# Patient Record
Sex: FEMALE | Race: WHITE | Employment: FULL TIME | URBAN - METROPOLITAN AREA
[De-identification: names, ages, dates, MRNs, and addresses within clinical notes are randomized per-mention and may not be internally consistent; named-entity substitution may affect disease eponyms.]

---

## 2017-06-19 ENCOUNTER — TELEPHONE (OUTPATIENT)
Dept: GASTROENTEROLOGY | Age: 27
End: 2017-06-19

## 2017-12-12 ENCOUNTER — TELEPHONE (OUTPATIENT)
Dept: BARIATRICS/WEIGHT MGMT | Age: 27
End: 2017-12-12

## 2017-12-12 NOTE — TELEPHONE ENCOUNTER
Patient called stating that she left a message last week regarding non surgical weight loss program and she did not receive a call back from [place]. I transferred the call to [place].

## 2018-01-03 ENCOUNTER — OFFICE VISIT (OUTPATIENT)
Dept: BARIATRICS/WEIGHT MGMT | Age: 28
End: 2018-01-03

## 2018-01-03 VITALS
BODY MASS INDEX: 50.02 KG/M2 | SYSTOLIC BLOOD PRESSURE: 121 MMHG | WEIGHT: 293 LBS | HEIGHT: 64 IN | DIASTOLIC BLOOD PRESSURE: 76 MMHG | HEART RATE: 70 BPM

## 2018-01-03 DIAGNOSIS — K76.0 FATTY LIVER: ICD-10-CM

## 2018-01-03 DIAGNOSIS — M54.50 CHRONIC BILATERAL LOW BACK PAIN WITHOUT SCIATICA: ICD-10-CM

## 2018-01-03 DIAGNOSIS — G89.29 CHRONIC BILATERAL LOW BACK PAIN WITHOUT SCIATICA: ICD-10-CM

## 2018-01-03 DIAGNOSIS — F32.9 REACTIVE DEPRESSION: ICD-10-CM

## 2018-01-03 DIAGNOSIS — E66.01 MORBID OBESITY WITH BMI OF 50.0-59.9, ADULT (HCC): Primary | ICD-10-CM

## 2018-01-03 DIAGNOSIS — D64.9 ANEMIA, UNSPECIFIED TYPE: ICD-10-CM

## 2018-01-03 DIAGNOSIS — K21.9 CHRONIC GERD: ICD-10-CM

## 2018-01-03 PROCEDURE — 1036F TOBACCO NON-USER: CPT | Performed by: SURGERY

## 2018-01-03 PROCEDURE — G8427 DOCREV CUR MEDS BY ELIG CLIN: HCPCS | Performed by: SURGERY

## 2018-01-03 PROCEDURE — 99205 OFFICE O/P NEW HI 60 MIN: CPT | Performed by: SURGERY

## 2018-01-03 PROCEDURE — S9470 NUTRITIONAL COUNSELING, DIET: HCPCS | Performed by: SURGERY

## 2018-01-03 PROCEDURE — G8484 FLU IMMUNIZE NO ADMIN: HCPCS | Performed by: SURGERY

## 2018-01-03 PROCEDURE — G8417 CALC BMI ABV UP PARAM F/U: HCPCS | Performed by: SURGERY

## 2018-01-04 PROBLEM — K76.0 FATTY LIVER: Status: ACTIVE | Noted: 2018-01-04

## 2018-01-07 ASSESSMENT — ENCOUNTER SYMPTOMS
BACK PAIN: 1
HEARTBURN: 1
CONSTIPATION: 0
PHOTOPHOBIA: 0
DOUBLE VISION: 0
DIARRHEA: 0
HEMOPTYSIS: 0
ORTHOPNEA: 0
EYE PAIN: 0
NAUSEA: 1
BLOOD IN STOOL: 0
SPUTUM PRODUCTION: 0
WHEEZING: 0
EYE REDNESS: 0
COUGH: 0
STRIDOR: 0
ABDOMINAL PAIN: 1
SHORTNESS OF BREATH: 1
EYE DISCHARGE: 0
VOMITING: 0
SORE THROAT: 0
BLURRED VISION: 0

## 2018-10-08 ENCOUNTER — HOSPITAL ENCOUNTER (EMERGENCY)
Age: 28
Discharge: HOME | End: 2018-10-08
Payer: COMMERCIAL

## 2018-10-08 VITALS
HEIGHT: 63 IN | TEMPERATURE: 97.5 F | BODY MASS INDEX: 51.91 KG/M2 | HEART RATE: 91 BPM | SYSTOLIC BLOOD PRESSURE: 131 MMHG | RESPIRATION RATE: 16 BRPM | WEIGHT: 293 LBS | DIASTOLIC BLOOD PRESSURE: 83 MMHG | OXYGEN SATURATION: 99 %

## 2018-10-08 DIAGNOSIS — R82.71 BACTERIURIA: ICD-10-CM

## 2018-10-08 DIAGNOSIS — R10.9 ABDOMINAL PAIN, UNSPECIFIED ABDOMINAL LOCATION: Primary | ICD-10-CM

## 2018-10-08 LAB
ALBUMIN SERPL-MCNC: 3.4 GM/DL (ref 3.4–5)
ALP BLD-CCNC: 72 IU/L (ref 40–129)
ALT SERPL-CCNC: 17 U/L (ref 10–40)
ANION GAP SERPL CALCULATED.3IONS-SCNC: 13 MMOL/L (ref 4–16)
AST SERPL-CCNC: 19 IU/L (ref 15–37)
BACTERIA: ABNORMAL /HPF
BASOPHILS ABSOLUTE: 0.1 K/CU MM
BASOPHILS RELATIVE PERCENT: 0.5 % (ref 0–1)
BILIRUB SERPL-MCNC: 0.8 MG/DL (ref 0–1)
BILIRUBIN URINE: NEGATIVE MG/DL
BLOOD, URINE: NEGATIVE
BUN BLDV-MCNC: 6 MG/DL (ref 6–23)
CALCIUM SERPL-MCNC: 8.4 MG/DL (ref 8.3–10.6)
CAST TYPE: ABNORMAL /HPF
CHLORIDE BLD-SCNC: 103 MMOL/L (ref 99–110)
CLARITY: CLEAR
CO2: 23 MMOL/L (ref 21–32)
COLOR: YELLOW
CREAT SERPL-MCNC: 0.7 MG/DL (ref 0.6–1.1)
CRYSTAL TYPE: ABNORMAL /HPF
DIFFERENTIAL TYPE: ABNORMAL
EOSINOPHILS ABSOLUTE: 0.2 K/CU MM
EOSINOPHILS RELATIVE PERCENT: 1.6 % (ref 0–3)
EPITHELIAL CELLS, UA: ABNORMAL /HPF
GFR AFRICAN AMERICAN: >60 ML/MIN/1.73M2
GFR NON-AFRICAN AMERICAN: >60 ML/MIN/1.73M2
GLUCOSE BLD-MCNC: 107 MG/DL (ref 70–99)
GLUCOSE, URINE: NEGATIVE MG/DL
HCG QUALITATIVE: NEGATIVE
HCT VFR BLD CALC: 40.3 % (ref 37–47)
HEMOGLOBIN: 11.7 GM/DL (ref 12.5–16)
IMMATURE NEUTROPHIL %: 0.4 % (ref 0–0.43)
KETONES, URINE: NEGATIVE MG/DL
LEUKOCYTE ESTERASE, URINE: NEGATIVE
LYMPHOCYTES ABSOLUTE: 3.5 K/CU MM
LYMPHOCYTES RELATIVE PERCENT: 24.1 % (ref 24–44)
MCH RBC QN AUTO: 21.9 PG (ref 27–31)
MCHC RBC AUTO-ENTMCNC: 29 % (ref 32–36)
MCV RBC AUTO: 75.3 FL (ref 78–100)
MONOCYTES ABSOLUTE: 0.6 K/CU MM
MONOCYTES RELATIVE PERCENT: 4 % (ref 0–4)
MUCUS: ABNORMAL HPF
NITRITE URINE, QUANTITATIVE: POSITIVE
PDW BLD-RTO: 16.1 % (ref 11.7–14.9)
PH, URINE: 5.5 (ref 5–8)
PLATELET # BLD: 427 K/CU MM (ref 140–440)
PMV BLD AUTO: 9.9 FL (ref 7.5–11.1)
POTASSIUM SERPL-SCNC: 3.4 MMOL/L (ref 3.5–5.1)
PROTEIN UA: NEGATIVE MG/DL
RBC # BLD: 5.35 M/CU MM (ref 4.2–5.4)
RBC URINE: ABNORMAL /HPF (ref 0–6)
SEGMENTED NEUTROPHILS ABSOLUTE COUNT: 10.1 K/CU MM
SEGMENTED NEUTROPHILS RELATIVE PERCENT: 69.4 % (ref 36–66)
SODIUM BLD-SCNC: 139 MMOL/L (ref 135–145)
SPECIFIC GRAVITY UA: 1.02 (ref 1–1.03)
TOTAL IMMATURE NEUTOROPHIL: 0.06 K/CU MM
TOTAL PROTEIN: 8.1 GM/DL (ref 6.4–8.2)
UROBILINOGEN, URINE: 1 MG/DL (ref 0.2–1)
VOLUME, (UVOL): 12 ML (ref 10–12)
WBC # BLD: 14.5 K/CU MM (ref 4–10.5)
WBC UA: ABNORMAL /HPF (ref 0–5)

## 2018-10-08 PROCEDURE — 96372 THER/PROPH/DIAG INJ SC/IM: CPT

## 2018-10-08 PROCEDURE — 85025 COMPLETE CBC W/AUTO DIFF WBC: CPT

## 2018-10-08 PROCEDURE — 80053 COMPREHEN METABOLIC PANEL: CPT

## 2018-10-08 PROCEDURE — 99283 EMERGENCY DEPT VISIT LOW MDM: CPT

## 2018-10-08 PROCEDURE — 81001 URINALYSIS AUTO W/SCOPE: CPT

## 2018-10-08 PROCEDURE — 84703 CHORIONIC GONADOTROPIN ASSAY: CPT

## 2018-10-08 PROCEDURE — 87086 URINE CULTURE/COLONY COUNT: CPT

## 2018-10-08 PROCEDURE — 36415 COLL VENOUS BLD VENIPUNCTURE: CPT

## 2018-10-08 PROCEDURE — 6360000002 HC RX W HCPCS: Performed by: PHYSICIAN ASSISTANT

## 2018-10-08 RX ORDER — CEPHALEXIN 500 MG/1
500 CAPSULE ORAL 3 TIMES DAILY
Qty: 21 CAPSULE | Refills: 0 | Status: SHIPPED | OUTPATIENT
Start: 2018-10-08 | End: 2018-10-15

## 2018-10-08 RX ORDER — DICYCLOMINE HYDROCHLORIDE 10 MG/ML
20 INJECTION INTRAMUSCULAR ONCE
Status: COMPLETED | OUTPATIENT
Start: 2018-10-08 | End: 2018-10-08

## 2018-10-08 RX ORDER — KETOROLAC TROMETHAMINE 30 MG/ML
15 INJECTION, SOLUTION INTRAMUSCULAR; INTRAVENOUS ONCE
Status: COMPLETED | OUTPATIENT
Start: 2018-10-08 | End: 2018-10-08

## 2018-10-08 RX ORDER — DICYCLOMINE HYDROCHLORIDE 10 MG/1
20 CAPSULE ORAL
Qty: 30 CAPSULE | Refills: 0 | Status: SHIPPED | OUTPATIENT
Start: 2018-10-08 | End: 2020-02-18

## 2018-10-08 RX ADMIN — KETOROLAC TROMETHAMINE 15 MG: 30 INJECTION, SOLUTION INTRAMUSCULAR; INTRAVENOUS at 18:22

## 2018-10-08 RX ADMIN — DICYCLOMINE HYDROCHLORIDE 20 MG: 20 INJECTION, SOLUTION INTRAMUSCULAR at 18:22

## 2018-10-08 ASSESSMENT — PAIN SCALES - GENERAL: PAINLEVEL_OUTOF10: 1

## 2018-10-08 NOTE — ED PROVIDER NOTES
40 - 129 IU/L    GFR Non-African American >60 >60 mL/min/1.73m2    GFR African American >60 >60 mL/min/1.73m2    Anion Gap 13 4 - 16   HCG Serum, Qualitative   Result Value Ref Range    hCG Qual NEGATIVE    Urinalysis   Result Value Ref Range    Color, UA YELLOW UYELL    Clarity, UA CLEAR CLEAR    Glucose, Urine NEGATIVE NEG MG/DL    Bilirubin Urine NEGATIVE NEG MG/DL    Ketones, Urine NEGATIVE NEG MG/DL    Specific Gravity, UA 1.025 1.001 - 1.035    Blood, Urine NEGATIVE NEG    pH, Urine 5.5 5.0 - 8.0    Protein, UA NEGATIVE NEG MG/DL    Urobilinogen, Urine 1.0 0.2 - 1.0 MG/DL    Nitrite Urine, Quantitative POSITIVE (A) NEG    Leukocyte Esterase, Urine NEGATIVE NEG    Volume, (UVOL) 12 10 - 12 ML    RBC, UA NO CELLS SEEN 0 - 6 /HPF    WBC, UA 2 TO 3 0 - 5 /HPF    Epi Cells 10 TO 12 /HPF    Cast Type NO CAST FORMS SEEN NCFS /HPF    Bacteria, UA MODERATE (A) NEG /HPF    Crystal Type NONE SEEN NEG /HPF    Mucus, UA 3+ (A) NEG HPF       ED COURSE & MEDICAL DECISION MAKING       Vital signs and nursing notes reviewed during ED course. I have independently evaluated this patient . Supervising MD present in the Emergency Department, available for consultation, throughout entirety of  patient care. Patient presents above with 3 days of intermittent abdominal pain. On arrival, patient is hemodynamically stable, afebrile. She is overall very well appearing on exam and resting comfortably in exam bed. Abdomen is soft with mild discomfort to palpation in suprapubic region. patient given dose of Bentyl and Toradol. Lab results with leukocytosis of 14.5. Serum hCG is negative. Urinalysis with moderate amounts of bacteria, to 3 white blood cells and is nitrite positive. Urine culture pending. This patient does have discomfort palpation over bladder, I discussed treatment of this possible urinary tract infection.   On reexamination, abdomen remained soft, she states that she is not currently having any abdominal pain

## 2018-10-08 NOTE — ED NOTES
Patient given AVS, discharge instructions and prescriptions. Verbalizes understanding no further needs identified at this time.      Aliza Macias RN  10/08/18 [id number]

## 2018-10-10 LAB
CULTURE: NORMAL
Lab: NORMAL
REPORT STATUS: NORMAL
SPECIMEN: NORMAL
TOTAL COLONY COUNT: NORMAL

## 2019-03-21 ENCOUNTER — HOSPITAL ENCOUNTER (OUTPATIENT)
Dept: NEUROLOGY | Age: 29
Discharge: HOME | End: 2019-03-21
Payer: COMMERCIAL

## 2019-03-21 PROCEDURE — 95861 NEEDLE EMG 2 EXTREMITIES: CPT

## 2019-05-24 ENCOUNTER — HOSPITAL ENCOUNTER (EMERGENCY)
Age: 29
Discharge: HOME | End: 2019-05-24
Payer: COMMERCIAL

## 2019-05-24 VITALS
OXYGEN SATURATION: 100 % | BODY MASS INDEX: 49.51 KG/M2 | HEART RATE: 99 BPM | DIASTOLIC BLOOD PRESSURE: 82 MMHG | TEMPERATURE: 98.6 F | HEIGHT: 64 IN | WEIGHT: 290 LBS | SYSTOLIC BLOOD PRESSURE: 139 MMHG | RESPIRATION RATE: 18 BRPM

## 2019-05-24 DIAGNOSIS — R04.0 EPISTAXIS: ICD-10-CM

## 2019-05-24 DIAGNOSIS — R09.81 NASAL CONGESTION: Primary | ICD-10-CM

## 2019-05-24 PROCEDURE — 6370000000 HC RX 637 (ALT 250 FOR IP): Performed by: PHYSICIAN ASSISTANT

## 2019-05-24 PROCEDURE — 99282 EMERGENCY DEPT VISIT SF MDM: CPT

## 2019-05-24 RX ORDER — OXYMETAZOLINE HYDROCHLORIDE 0.05 G/100ML
2 SPRAY NASAL ONCE
Status: COMPLETED | OUTPATIENT
Start: 2019-05-24 | End: 2019-05-24

## 2019-05-24 RX ORDER — ECHINACEA PURPUREA EXTRACT 125 MG
1 TABLET ORAL PRN
Qty: 1 BOTTLE | Refills: 3 | Status: SHIPPED | OUTPATIENT
Start: 2019-05-24 | End: 2020-02-18

## 2019-05-24 RX ADMIN — OXYMETAZOLINE HCL 2 SPRAY: 0.05 SPRAY NASAL at 09:01

## 2019-05-24 NOTE — ED PROVIDER NOTES
eMERGENCY dEPARTMENT eNCOUnter        PCP: DADA Cazares - CNP    CHIEF COMPLAINT  Chief Complaint   Patient presents with    Epistaxis     from rt nostril only since this am       HPI    Allie Giron is a 33 y.o. female who presents with nosebleed with onset  this a.m. Patient has had some nasal congestion a few past few days. Has had an intermittent light nosebleeds this morning. When she sits at rest without anything in her nose does not drip out of her nose. Has not had any blood in her mouth. The location is the right naris. The context was a spontaneous onset. The patient is taking no blood thinners. The quality of the bleeding is bright red blood. There no aggravating or alleviating factors except for direct pressure that has slowed down the bleeding. The patient has no associated symptoms. REVIEW OF SYSTEMS    General: No Fever  Cardiac: No Chest Pain, Denies near syncope / syncope. Respiratory: No shortness of breath or or difficulty breathing  GI: No abdominal pain. No Bloody Stool or dark stools. : No Dysuria or Hematuria  Neurologic: No LOC, no lightheadedness  Integument: No spontaneous bruising. No rash.     All other review of systems are negative  See HPI and nursing notes for additional information       PAST MEDICAL & SURGICAL HISTORY    Past Medical History:   Diagnosis Date    Anemia 2010    Gall bladder stones     UTI (lower urinary tract infection)     chronic UTI's with this pregnancy     Past Surgical History:   Procedure Laterality Date     SECTION       SECTION      CHOLECYSTECTOMY      HERNIA REPAIR      HERNIA REPAIR      LAPAROSCOPY  13    diagnostic    UPPER GASTROINTESTINAL ENDOSCOPY         CURRENT MEDICATIONS    Current Outpatient Rx   Medication Sig Dispense Refill    sodium chloride (OCEAN) 0.65 % nasal spray 1 spray by Nasal route as needed for Congestion 1 Bottle 3    dicyclomine (BENTYL) 10 MG capsule Take 2 PHYSICAL EXAM    VITAL SIGNS: /82   Pulse 99   Temp 98.6 °F (37 °C) (Oral)   Resp 18   Ht 5' 4\" (1.626 m)   Wt 290 lb (131.5 kg)   SpO2 100%   BMI 49.78 kg/m²    Constitutional:  Well developed, well nourished, no acute distress   Nose: + Dried blood noted within the right nares. Left nares unremarkable. Edematous turbinates with nasal congestion noted. HENT:  Atraumatic, moist mucus membranes. Moist, pink palpebral conjunctiva  Oropharynx clear of blood. Neck/Lymphatics: supple, no JVD, no swollen nodes  Respiratory:  Lungs Clear, no retractions   Cardiovascular:  regular rate, no murmurs  GI:  Soft, nontender, normal bowel sounds  Musculoskeletal:  No edema, no acute deformities  Integument:  Well hydrated, no petechiae, ecchymosis, or purpura  Neurologic:  Alert & oriented, no slurred speech  Psych: Pleasant affect, no hallucinations              ED COURSE & MEDICAL DECISION MAKING       Vital signs and nursing notes reviewed during ED course. I have independently evaluated this patient . Supervising MD present in the Emergency Department, available for consultation, throughout entirety of  patient care. Disposition and plan discussed at bedside with patient and/or the family today. All pertinent Lab data and radiographic results reviewed with patient at bedside. The patient and/or the family were informed of the results of any tests/labs/imaging, the treatment plan, and time was allotted to answer questions. Vitals:    05/24/19 0814 05/24/19 0905   BP: 132/63 139/82   Pulse: 108 99   Resp: 18 18   Temp: 98.6 °F (37 °C)    TempSrc: Oral    SpO2: 99% 100%   Weight: 290 lb (131.5 kg)    Height: 5' 4\" (1.626 m)        Patient presents as above. He is afebrile 99% on room air. Not tachycardic. Presents with intermittent epistaxis this morning that appears to be like a white drip.  Not actively bleeding when I evaluate patient she has dried blood noted with significant nasal congestion. Discussed expectant management, nasal saline, lubrication, humidifier, nasal congestion control. Hemodynamically stable. At this time I do feel patient is safe for discharge. We did discuss expectant management, follow-up with PCP and signs and symptoms are more more emergent return to ED for reevaluation. Patient comfortable with this workup and plan. Clinical  IMPRESSION    Epistaxis      We discussed the use of humidifier at home and I recommend over-the-counter Ayr saline gel to keep nasal mucosa lubricated. Patient agrees to return emergency department if bleeding recurs or any new symptoms develop. Comment: Please note this report has been produced using speech recognition software and may contain errors related to that system including errors in grammar, punctuation, and spelling, as well as words and phrases that may be inappropriate. If there are any questions or concerns please feel free to contact the dictating provider for clarification.        Ede Matthews PA-C  05/24/19 2759

## 2019-09-30 ENCOUNTER — HOSPITAL ENCOUNTER (OUTPATIENT)
Dept: NUCLEAR MEDICINE | Age: 29
Discharge: HOME | End: 2019-09-30
Payer: COMMERCIAL

## 2019-09-30 DIAGNOSIS — E05.90 T3 THYROTOXICOSIS: ICD-10-CM

## 2019-09-30 PROCEDURE — 78014 THYROID IMAGING W/BLOOD FLOW: CPT

## 2019-09-30 PROCEDURE — A9516 IODINE I-123 SOD IODIDE MIC: HCPCS | Performed by: INTERNAL MEDICINE

## 2019-09-30 PROCEDURE — 3430000000 HC RX DIAGNOSTIC RADIOPHARMACEUTICAL: Performed by: INTERNAL MEDICINE

## 2019-09-30 RX ADMIN — SODIUM IODIDE I 123 228 MICRO CURIE: 100 CAPSULE, GELATIN COATED ORAL at 08:55

## 2019-10-01 ENCOUNTER — HOSPITAL ENCOUNTER (OUTPATIENT)
Dept: NUCLEAR MEDICINE | Age: 29
Discharge: HOME | End: 2019-10-01
Payer: COMMERCIAL

## 2020-02-10 ENCOUNTER — TELEPHONE (OUTPATIENT)
Dept: SURGERY | Age: 30
End: 2020-02-10

## 2020-02-10 ENCOUNTER — OFFICE VISIT (OUTPATIENT)
Dept: SURGERY | Age: 30
End: 2020-02-10
Payer: COMMERCIAL

## 2020-02-10 VITALS
BODY MASS INDEX: 48.56 KG/M2 | HEART RATE: 116 BPM | WEIGHT: 263.9 LBS | SYSTOLIC BLOOD PRESSURE: 138 MMHG | DIASTOLIC BLOOD PRESSURE: 86 MMHG | HEIGHT: 62 IN

## 2020-02-10 PROCEDURE — 99204 OFFICE O/P NEW MOD 45 MIN: CPT | Performed by: SURGERY

## 2020-02-10 PROCEDURE — 1036F TOBACCO NON-USER: CPT | Performed by: SURGERY

## 2020-02-10 PROCEDURE — G8417 CALC BMI ABV UP PARAM F/U: HCPCS | Performed by: SURGERY

## 2020-02-10 PROCEDURE — G8427 DOCREV CUR MEDS BY ELIG CLIN: HCPCS | Performed by: SURGERY

## 2020-02-10 PROCEDURE — G8484 FLU IMMUNIZE NO ADMIN: HCPCS | Performed by: SURGERY

## 2020-02-10 ASSESSMENT — ENCOUNTER SYMPTOMS
TROUBLE SWALLOWING: 0
NAUSEA: 0
BACK PAIN: 1
ABDOMINAL PAIN: 0
VOICE CHANGE: 0
ABDOMINAL DISTENTION: 0
SINUS PRESSURE: 0
DIARRHEA: 0
COLOR CHANGE: 0
CHEST TIGHTNESS: 0
EYE REDNESS: 0
SHORTNESS OF BREATH: 0
EYE PAIN: 0
SORE THROAT: 0

## 2020-02-10 NOTE — TELEPHONE ENCOUNTER
[facility] Scheduling, requested order and demographics be faxed over, they would call patient to schedule.

## 2020-02-10 NOTE — PROGRESS NOTES
Department of GeneralSurgery   Dr Cody Castro:  Thyroid disease     History Obtained From:  patient    HISTORY OF PRESENT ILLNESS:      79-year-old female with past medical history of anemia secondary to menorrhagia and cholelithiasis who presents to clinic today to discuss possible thyroid surgery. Patient reports she initially presented to her PCP in 2018 for weight gain in which she underwent thyroid labs. She was noted to be hyperthyroid. She had an ultrasound at that time performed in [place]. Secondary to insurance loss she was lost to follow-up. She now follows with Dr. Ap Herr. She underwent radionucleotide thyroid uptake scan which is likely consistent with Graves' disease. She is currently prescribed PTU however, she is unable to take the medication secondary to nausea, vomiting. She reports she now has a prescription for Zofran and she will attempt to retake the medication. She denies any diaphoresis, tremors, palpitations, and weight loss. She does report that she has an elevated heart rate in which she has not seen a cardiologist.  Her pulse rate at today's visit is 116 bpm.    She reports her past surgical history is consistent with  and cholecystectomy. She reports an allergy to penicillin, itching and morphine, unknown. She denies smoking and alcohol use. She does report a sister with thyroid disease however, she is unclear what kind.   Her sister has had thyroid surgery in the past.    Past Medical History:    Past Medical History:   Diagnosis Date    Anemia    Belvia Walker bladder stones     UTI (lower urinary tract infection)     chronic UTI's with this pregnancy       Past Surgical History:    Past Surgical History:   Procedure Laterality Date     SECTION       SECTION      CHOLECYSTECTOMY      HERNIA REPAIR      HERNIA REPAIR      LAPAROSCOPY  13    diagnostic    UPPER GASTROINTESTINAL ENDOSCOPY         Current Medications: partner violence:     Fear of current or ex partner: None     Emotionally abused: None     Physically abused: None     Forced sexual activity: None   Other Topics Concern    None   Social History Narrative    None       Family History:   Family History   Problem Relation Age of Onset    Thyroid Disease Sister     Thyroid Disease Paternal Aunt     Cancer Maternal Grandmother     Cancer Paternal Grandmother        REVIEW OFSYSTEMS:    Review of Systems   Constitutional: Positive for unexpected weight change. Negative for chills and fever. HENT: Positive for ear pain. Negative for sinus pressure, sore throat, trouble swallowing and voice change. Eyes: Negative for pain and redness. Respiratory: Negative for chest tightness and shortness of breath. Cardiovascular: Negative for chest pain and palpitations. Gastrointestinal: Negative for abdominal distention, abdominal pain, diarrhea and nausea. Endocrine: Negative for cold intolerance and heat intolerance. Genitourinary: Negative for flank pain and frequency. Musculoskeletal: Positive for arthralgias and back pain. Skin: Negative for color change and pallor. Neurological: Negative for dizziness, seizures and facial asymmetry. Hematological: Negative for adenopathy. Does not bruise/bleed easily. Psychiatric/Behavioral: Negative for agitation and behavioral problems. PHYSICAL EXAM:  Vitals:    02/10/20 1436   BP: 138/86   Pulse: 116   Weight: 263 lb 14.4 oz (119.7 kg)   Height: 5' 2\" (1.575 m)       Physical Exam  Constitutional:       Appearance: Normal appearance. HENT:      Head: Normocephalic and atraumatic. Right Ear: External ear normal.      Left Ear: External ear normal.      Nose: Nose normal.      Mouth/Throat:      Mouth: Mucous membranes are moist.   Eyes:      Extraocular Movements: Extraocular movements intact. Pupils: Pupils are equal, round, and reactive to light. Neck:      Musculoskeletal: Neck supple. No neck rigidity or muscular tenderness. Comments: No significant goiter palpable. Her left thyroid appears more full than the right. Mild palpable nodularity on the left. Cardiovascular:      Rate and Rhythm: Tachycardia present. Pulmonary:      Effort: Pulmonary effort is normal.   Abdominal:      General: There is no distension. Tenderness: There is no abdominal tenderness. There is no right CVA tenderness or guarding. Lymphadenopathy:      Cervical: No cervical adenopathy. Skin:     General: Skin is warm. Neurological:      General: No focal deficit present. Mental Status: She is alert and oriented to person, place, and time. Psychiatric:         Mood and Affect: Mood normal.         Behavior: Behavior normal.           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 She is currently unable to take PTU secondary to nausea and vomiting. She recently received a prescription for Zofran and states that she will attempt to take the medication again. She does have mild sinus tachycardia with a heart rate of 116 bpm in the office today. She has not yet seen a cardiologist.    She reports her last ultrasound of the neck was in 2018. She also reports that she recently had labs drawn by Dr. Mecca Denney office in January. Lastly, she complains of significant back and bone pain. She denies any history of kidney stones however, she does have a history of gallstones.   She also reports being slightly more

## 2020-02-13 ENCOUNTER — OFFICE VISIT (OUTPATIENT)
Dept: SURGERY | Age: 30
End: 2020-02-13
Payer: COMMERCIAL

## 2020-02-13 VITALS
SYSTOLIC BLOOD PRESSURE: 130 MMHG | WEIGHT: 261.7 LBS | BODY MASS INDEX: 48.16 KG/M2 | DIASTOLIC BLOOD PRESSURE: 84 MMHG | HEIGHT: 62 IN | HEART RATE: 112 BPM

## 2020-02-13 PROCEDURE — G8417 CALC BMI ABV UP PARAM F/U: HCPCS | Performed by: SURGERY

## 2020-02-13 PROCEDURE — 99213 OFFICE O/P EST LOW 20 MIN: CPT | Performed by: SURGERY

## 2020-02-13 PROCEDURE — G8484 FLU IMMUNIZE NO ADMIN: HCPCS | Performed by: SURGERY

## 2020-02-13 PROCEDURE — G8427 DOCREV CUR MEDS BY ELIG CLIN: HCPCS | Performed by: SURGERY

## 2020-02-13 PROCEDURE — 1036F TOBACCO NON-USER: CPT | Performed by: SURGERY

## 2020-02-13 RX ORDER — IODINE SOLUTION STRONG 5% (LUGOL'S) 5 %
0.2 SOLUTION ORAL 3 TIMES DAILY
Qty: 1 BOTTLE | Refills: 3 | Status: ON HOLD
Start: 2020-02-13 | End: 2020-03-04 | Stop reason: HOSPADM

## 2020-02-13 ASSESSMENT — ENCOUNTER SYMPTOMS
SINUS PRESSURE: 0
DIARRHEA: 0
NAUSEA: 0
SORE THROAT: 0
EYE REDNESS: 0
VOICE CHANGE: 0
BACK PAIN: 1
COLOR CHANGE: 0
SHORTNESS OF BREATH: 0
TROUBLE SWALLOWING: 0
ABDOMINAL PAIN: 0
ABDOMINAL DISTENTION: 0
CHEST TIGHTNESS: 0
EYE PAIN: 0

## 2020-02-13 NOTE — PROGRESS NOTES
Department of GeneralSurgery   Dr Debi Espinosa:  Thyroid disease     History Obtained From:  patient    HISTORY OF PRESENT ILLNESS:      26-year-old female with past medical history of anemia secondary to menorrhagia and cholelithiasis who presents to clinic today to discuss possible thyroid surgery. She is here post u/s of thyroid for graves disease. This was done at [facility]. It shows no nodules but hypervascularity consistent with Graves disease. She reports her past surgical history is consistent with  and cholecystectomy. She reports an allergy to penicillin, itching and morphine, unknown. She denies smoking and alcohol use. She does report a sister with thyroid disease however, she is unclear what kind.   Her sister has had thyroid surgery in the past.    Past Medical History:    Past Medical History:   Diagnosis Date    Anemia 2010    Gall bladder stones     UTI (lower urinary tract infection)     chronic UTI's with this pregnancy       Past Surgical History:    Past Surgical History:   Procedure Laterality Date     SECTION       SECTION      CHOLECYSTECTOMY      HERNIA REPAIR      HERNIA REPAIR      LAPAROSCOPY  13    diagnostic    UPPER GASTROINTESTINAL ENDOSCOPY         Current Medications:   Current Outpatient Medications   Medication Sig Dispense Refill    Iodine Strong, Lugols, (STRONG IODINE) 5 % solution Take 0.2 mLs by mouth 3 times daily For 7 days pre-op 1 Bottle 3    sodium chloride (OCEAN) 0.65 % nasal spray 1 spray by Nasal route as needed for Congestion 1 Bottle 3    dicyclomine (BENTYL) 10 MG capsule Take 2 capsules by mouth 4 times daily (before meals and nightly) 30 capsule 0    medroxyPROGESTERone (DEPO-PROVERA) 150 MG/ML injection INJECT 1 MILLILITER (150 MG) BY INTRAMUSCULAR ROUTE EVERY 3 MONTHS FOR 90 DAYS  0    ferrous sulfate 325 (65 FE) MG tablet Take 325 mg by mouth daily (with breakfast)       No current

## 2020-02-14 ENCOUNTER — ANESTHESIA EVENT (OUTPATIENT)
Dept: OPERATING ROOM | Age: 30
End: 2020-02-14
Payer: COMMERCIAL

## 2020-02-17 ENCOUNTER — TELEPHONE (OUTPATIENT)
Dept: SURGERY | Age: 30
End: 2020-02-17

## 2020-02-17 NOTE — TELEPHONE ENCOUNTER
SP[place] (thyroidectomy) SCHEDULED @ [facility].  NOTIFIED OF DATES, TIMES AND LOCATION    PHONE ASSESSMENT /PAT - 2/18/20 @ 200  SURGERY - 2/25/20 @ 11  P/O - 3/9/20 @ 900    Start lugols 2/18/20  NPO AFTER MIDNIGHT  Patient not on blood thinners   SENT

## 2020-02-17 NOTE — ANESTHESIA PRE PROCEDURE
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 D48.5       Past Medical History:        Diagnosis Date    Anemia     Gall bladder stones     UTI (lower urinary tract infection)     chronic UTI's with this pregnancy       Past Surgical History:        Procedure Laterality Date     SECTION       SECTION      CHOLECYSTECTOMY      HERNIA REPAIR      HERNIA REPAIR      LAPAROSCOPY  13    diagnostic    UPPER GASTROINTESTINAL ENDOSCOPY         Social History:    Social History     Tobacco Use    Smoking status: Former Smoker     Packs/day: 0.10     Types: Cigarettes     Last attempt to quit: 2013     Years since quittin.1    Smokeless tobacco: Never Used   Substance Use Topics    Alcohol use: No                                Counseling given: Not Answered      Vital Signs (Current): There were no vitals filed for this visit. BP Readings from Last 3 Encounters:   20 130/84   02/10/20 138/86   19 139/82       NPO Status:                                                                                 BMI:   Wt Readings from Last 3 Encounters:   20 261 lb 11.2 oz (118.7 kg)   02/10/20 263 lb 14.4 oz (119.7 kg)   19 290 lb (131.5 kg)     There is no height or weight on file to calculate BMI.       CBC  Lab Results   Component Value Date/Time    WBC 9.1 2020 01:24 PM    HGB 10.6 (L) 6/5/9320    Umbilical hernia repair,  2013      Left Medial Lower extremity ulcers s/pt nerve decompression repair, excision of neoplasm, left foot, with muscle flap and graft skin application, 2129. Resolved  Abdominal:           Vascular:                              Anesthesia Plan      general     ASA 3       Induction: intravenous. MIPS: Postoperative opioids intended and Prophylactic antiemetics administered. Anesthetic plan and risks discussed with patient and sibling. Plan discussed with CRNA. DADA Andino - CNP Chart reviewed, pt. Interviewed and examined. Anesthesia Evaluation will follow by a certified practitioner prior to surgery.   2/17/2020

## 2020-02-18 ENCOUNTER — HOSPITAL ENCOUNTER (OUTPATIENT)
Dept: PREADMISSION TESTING | Age: 30
Discharge: HOME | End: 2020-02-22
Payer: COMMERCIAL

## 2020-02-18 VITALS
SYSTOLIC BLOOD PRESSURE: 127 MMHG | RESPIRATION RATE: 18 BRPM | DIASTOLIC BLOOD PRESSURE: 75 MMHG | HEART RATE: 112 BPM | WEIGHT: 258 LBS | OXYGEN SATURATION: 98 % | BODY MASS INDEX: 45.71 KG/M2 | HEIGHT: 63 IN | TEMPERATURE: 97.6 F

## 2020-02-18 LAB
ANION GAP SERPL CALCULATED.3IONS-SCNC: 12 MMOL/L (ref 4–16)
BUN BLDV-MCNC: 8 MG/DL (ref 6–23)
CALCIUM SERPL-MCNC: 8.8 MG/DL (ref 8.3–10.6)
CHLORIDE BLD-SCNC: 107 MMOL/L (ref 99–110)
CO2: 22 MMOL/L (ref 21–32)
CREAT SERPL-MCNC: 0.4 MG/DL (ref 0.6–1.1)
EKG ATRIAL RATE: 94 BPM
EKG DIAGNOSIS: NORMAL
EKG P AXIS: 65 DEGREES
EKG P-R INTERVAL: 130 MS
EKG Q-T INTERVAL: 348 MS
EKG QRS DURATION: 90 MS
EKG QTC CALCULATION (BAZETT): 435 MS
EKG R AXIS: -4 DEGREES
EKG T AXIS: 33 DEGREES
EKG VENTRICULAR RATE: 94 BPM
GFR AFRICAN AMERICAN: >60 ML/MIN/1.73M2
GFR NON-AFRICAN AMERICAN: >60 ML/MIN/1.73M2
GLUCOSE BLD-MCNC: 109 MG/DL (ref 70–99)
HCT VFR BLD CALC: 35.6 % (ref 37–47)
HEMOGLOBIN: 10.6 GM/DL (ref 12.5–16)
MCH RBC QN AUTO: 21.5 PG (ref 27–31)
MCHC RBC AUTO-ENTMCNC: 29.8 % (ref 32–36)
MCV RBC AUTO: 72.2 FL (ref 78–100)
PDW BLD-RTO: 15.7 % (ref 11.7–14.9)
PLATELET # BLD: 376 K/CU MM (ref 140–440)
PMV BLD AUTO: 9.7 FL (ref 7.5–11.1)
POTASSIUM SERPL-SCNC: 3.7 MMOL/L (ref 3.5–5.1)
RBC # BLD: 4.93 M/CU MM (ref 4.2–5.4)
SODIUM BLD-SCNC: 141 MMOL/L (ref 135–145)
WBC # BLD: 9.1 K/CU MM (ref 4–10.5)

## 2020-02-18 PROCEDURE — 93005 ELECTROCARDIOGRAM TRACING: CPT | Performed by: NURSE PRACTITIONER

## 2020-02-18 PROCEDURE — 93010 ELECTROCARDIOGRAM REPORT: CPT | Performed by: INTERNAL MEDICINE

## 2020-02-18 PROCEDURE — 80048 BASIC METABOLIC PNL TOTAL CA: CPT

## 2020-02-18 PROCEDURE — 85027 COMPLETE CBC AUTOMATED: CPT

## 2020-02-18 RX ORDER — IBUPROFEN 800 MG/1
800 TABLET ORAL EVERY 8 HOURS PRN
COMMUNITY
End: 2021-08-23

## 2020-02-18 RX ORDER — ONDANSETRON 4 MG/1
TABLET, FILM COATED ORAL
COMMUNITY
Start: 2020-02-15 | End: 2021-08-23

## 2020-02-18 RX ORDER — PROPRANOLOL HYDROCHLORIDE 20 MG/1
20 TABLET ORAL 2 TIMES DAILY
COMMUNITY
End: 2020-02-25 | Stop reason: SDUPTHER

## 2020-02-18 ASSESSMENT — ENCOUNTER SYMPTOMS: DYSPNEA ACTIVITY LEVEL: AFTER AMBULATING 2 FLIGHTS OF STAIRS

## 2020-02-25 ENCOUNTER — HOSPITAL ENCOUNTER (EMERGENCY)
Age: 30
Discharge: HOME | End: 2020-02-25
Payer: COMMERCIAL

## 2020-02-25 ENCOUNTER — APPOINTMENT (OUTPATIENT)
Dept: GENERAL RADIOLOGY | Age: 30
End: 2020-02-25
Payer: COMMERCIAL

## 2020-02-25 VITALS
HEIGHT: 63 IN | HEART RATE: 115 BPM | TEMPERATURE: 98 F | OXYGEN SATURATION: 100 % | WEIGHT: 258 LBS | RESPIRATION RATE: 26 BRPM | SYSTOLIC BLOOD PRESSURE: 147 MMHG | BODY MASS INDEX: 45.71 KG/M2 | DIASTOLIC BLOOD PRESSURE: 71 MMHG

## 2020-02-25 LAB
ALBUMIN SERPL-MCNC: 3.2 GM/DL (ref 3.4–5)
ALP BLD-CCNC: 138 IU/L (ref 40–129)
ALT SERPL-CCNC: 15 U/L (ref 10–40)
ANION GAP SERPL CALCULATED.3IONS-SCNC: 13 MMOL/L (ref 4–16)
AST SERPL-CCNC: 18 IU/L (ref 15–37)
BASOPHILS ABSOLUTE: 0 K/CU MM
BASOPHILS RELATIVE PERCENT: 0.2 % (ref 0–1)
BILIRUB SERPL-MCNC: 0.6 MG/DL (ref 0–1)
BUN BLDV-MCNC: 8 MG/DL (ref 6–23)
CALCIUM SERPL-MCNC: 8.9 MG/DL (ref 8.3–10.6)
CHLORIDE BLD-SCNC: 105 MMOL/L (ref 99–110)
CO2: 23 MMOL/L (ref 21–32)
CREAT SERPL-MCNC: 0.3 MG/DL (ref 0.6–1.1)
D DIMER: 227 NG/ML(DDU)
DIFFERENTIAL TYPE: ABNORMAL
EOSINOPHILS ABSOLUTE: 0.1 K/CU MM
EOSINOPHILS RELATIVE PERCENT: 1.2 % (ref 0–3)
GFR AFRICAN AMERICAN: >60 ML/MIN/1.73M2
GFR NON-AFRICAN AMERICAN: >60 ML/MIN/1.73M2
GLUCOSE BLD-MCNC: 102 MG/DL (ref 70–99)
HCG QUALITATIVE: NEGATIVE
HCT VFR BLD CALC: 35 % (ref 37–47)
HEMOGLOBIN: 10.4 GM/DL (ref 12.5–16)
IMMATURE NEUTROPHIL %: 0.2 % (ref 0–0.43)
LYMPHOCYTES ABSOLUTE: 2.5 K/CU MM
LYMPHOCYTES RELATIVE PERCENT: 29.9 % (ref 24–44)
MAGNESIUM: 1.8 MG/DL (ref 1.8–2.4)
MCH RBC QN AUTO: 21.4 PG (ref 27–31)
MCHC RBC AUTO-ENTMCNC: 29.7 % (ref 32–36)
MCV RBC AUTO: 72.2 FL (ref 78–100)
MONOCYTES ABSOLUTE: 0.6 K/CU MM
MONOCYTES RELATIVE PERCENT: 6.9 % (ref 0–4)
NUCLEATED RBC %: 0 %
PDW BLD-RTO: 15.8 % (ref 11.7–14.9)
PLATELET # BLD: 379 K/CU MM (ref 140–440)
PMV BLD AUTO: 9.8 FL (ref 7.5–11.1)
POTASSIUM SERPL-SCNC: 3.5 MMOL/L (ref 3.5–5.1)
RBC # BLD: 4.85 M/CU MM (ref 4.2–5.4)
SEGMENTED NEUTROPHILS ABSOLUTE COUNT: 5.1 K/CU MM
SEGMENTED NEUTROPHILS RELATIVE PERCENT: 61.6 % (ref 36–66)
SODIUM BLD-SCNC: 141 MMOL/L (ref 135–145)
T4 FREE: 4.43 NG/DL (ref 0.9–1.8)
TOTAL IMMATURE NEUTOROPHIL: 0.02 K/CU MM
TOTAL NUCLEATED RBC: 0 K/CU MM
TOTAL PROTEIN: 7.4 GM/DL (ref 6.4–8.2)
TROPONIN T: <0.01 NG/ML
TSH HIGH SENSITIVITY: <0.01 UIU/ML (ref 0.27–4.2)
WBC # BLD: 8.4 K/CU MM (ref 4–10.5)

## 2020-02-25 PROCEDURE — 85025 COMPLETE CBC W/AUTO DIFF WBC: CPT

## 2020-02-25 PROCEDURE — 84703 CHORIONIC GONADOTROPIN ASSAY: CPT

## 2020-02-25 PROCEDURE — 84484 ASSAY OF TROPONIN QUANT: CPT

## 2020-02-25 PROCEDURE — 85379 FIBRIN DEGRADATION QUANT: CPT

## 2020-02-25 PROCEDURE — 83735 ASSAY OF MAGNESIUM: CPT

## 2020-02-25 PROCEDURE — 93005 ELECTROCARDIOGRAM TRACING: CPT | Performed by: PHYSICIAN ASSISTANT

## 2020-02-25 PROCEDURE — 84439 ASSAY OF FREE THYROXINE: CPT

## 2020-02-25 PROCEDURE — 80053 COMPREHEN METABOLIC PANEL: CPT

## 2020-02-25 PROCEDURE — 36415 COLL VENOUS BLD VENIPUNCTURE: CPT

## 2020-02-25 PROCEDURE — 93010 ELECTROCARDIOGRAM REPORT: CPT | Performed by: INTERNAL MEDICINE

## 2020-02-25 PROCEDURE — 99285 EMERGENCY DEPT VISIT HI MDM: CPT

## 2020-02-25 PROCEDURE — 84443 ASSAY THYROID STIM HORMONE: CPT

## 2020-02-25 PROCEDURE — 71045 X-RAY EXAM CHEST 1 VIEW: CPT

## 2020-02-25 RX ORDER — PROPRANOLOL HYDROCHLORIDE 20 MG/1
40 TABLET ORAL 3 TIMES DAILY
Qty: 42 TABLET | Refills: 0 | Status: SHIPPED | OUTPATIENT
Start: 2020-02-25 | End: 2020-03-16

## 2020-02-25 NOTE — ED PROVIDER NOTES
This EKG was interpreted by me. Rate is 118, rhythm is sinus. PA and QT intervals are within normal limits. There is no ST segment or T wave changes. This EKG was compared to previous EKG from date 2/18/2020. T wave previously inverted in V3, now upright.      Cyndi Gowers, DO  02/25/20 7112

## 2020-02-25 NOTE — LETTER
[facility] Emergency Department  [address] [place] [id number]  Phone: [phone]  Fax: [phone]               February 25, 2020    Patient: Louie Olvera   YOB: 1990   Date of Visit: 2/25/2020       To Whom It May Concern:    Sincere Singletary was seen and treated in our emergency department on 2/25/2020.  She may return to work 2/27/2020      Sincerely,       Juanita LEAL      Signature:__________________________________

## 2020-02-26 NOTE — ED PROVIDER NOTES
Patient Identification  Anish Bangura is a 26 y.o. female    Chief Complaint  Tachycardia (seen at [facility] office today for thyroid issues, told to come here for recurrent HR of 115-120 over the past 2 weeks) and Shortness of Breath (with exertion)      HPI  (History provided by patient)  This is a 26 y.o. female who was brought in by self for chief complaint of tachycardia, SOB. Patient has a known history of Graves' disease with hyperthyroidism. States that she has been noting that for the past 2 weeks her heart rate has been elevated, is been occasionally short of breath with exertion. Reports occasional aching in her chest that is not currently present. States she is scheduled for surgery to have thyroid removed next week but went to Dr Nico Koroma office today and was sent here. She is currently on propranolol and states she has been taking this. No history of cardiac arrhythmia. No history of DVT or PE. Denies pain or swelling in legs. No syncope. Has been more tired than usual.       REVIEW OF SYSTEMS    Constitutional:  Denies fever, chills  HENT:  Denies sore throat or ear pain   Eyes: Denies vision changes, eye pain  Cardiovascular:  Denies syncope  Respiratory:  Denies cough   GI:  Denies abdominal pain, nausea, vomiting  :  Denies dysuria, discharge  Musculoskeletal:  Denies back pain, joint pain  Skin:  Denies rash, pruritis  Neurologic:  Denies headache, focal weakness, or sensory changes     See HPI and nursing notes for additional information     I have reviewed the following nursing documentation:  Allergies: Allergies   Allergen Reactions    Pcn [Penicillins] Hives    Morphine Hives    Gabapentin Rash       Past medical history:  has a past medical history of Anemia (), Foot ulcer, left ([facility] [address] (2017), Graves disease (Dx: 2019), Graves disease, and UTI (lower urinary tract infection).     Past surgical history:  has a past surgical history that includes  section; laparoscopy (12/21/13); Upper gastrointestinal endoscopy (07/24/2012); Cholecystectomy (2010); hernia repair; and hernia repair (2013). Home medications:   Prior to Admission medications    Medication Sig Start Date End Date Taking? Authorizing Provider   propranolol (INDERAL) 20 MG tablet Take 2 tablets by mouth 3 times daily for 7 days 2/25/20 3/3/20 Yes Tomás Samaniego PA-C   ibuprofen (ADVIL;MOTRIN) 800 MG tablet Take 800 mg by mouth every 8 hours as needed for Pain    Historical Provider, MD   ondansetron (ZOFRAN) 4 MG tablet  2/15/20   Historical Provider, MD   Iodine Strong, Lugols, (STRONG IODINE) 5 % solution Take 0.2 mLs by mouth 3 times daily For 7 days pre-op 2/13/20   Cassandra Deluca MD   ferrous sulfate 325 (65 FE) MG tablet Take 325 mg by mouth daily (with breakfast)    Historical Provider, MD       Social history:  reports that she quit smoking about 6 years ago. Her smoking use included cigarettes. She started smoking about 8 years ago. She has a 0.10 pack-year smoking history. She has never used smokeless tobacco. She reports that she does not drink alcohol or use drugs. Family history:    Family History   Problem Relation Age of Onset    Thyroid Disease Sister     Thyroid Disease Paternal Aunt     Cancer Maternal Grandmother     Cancer Paternal Grandmother     Diabetes Mother     Heart Disease Mother     No Known Problems Father          Exam  BP (!) 147/71   Pulse 115   Temp 98 °F (36.7 °C)   Resp 26   Ht 5' 2.5\" (1.588 m)   Wt 258 lb (117 kg)   LMP 02/01/2019 (Approximate)   SpO2 100%   BMI 46.44 kg/m²   Nursing note and vitals reviewed. Constitutional: Well developed, well nourished. No acute distress. HENT:      Head: Normocephalic and atraumatic. Ears: External ears normal.      Nose: Nose normal.     Mouth: Membrane mucosa moist and pink. No posterior oropharynx erythema or tonsillar edema  Eyes: Anicteric sclera. No discharge, PERRL  Neck: Supple.  Trachea

## 2020-02-28 LAB
EKG ATRIAL RATE: 118 BPM
EKG DIAGNOSIS: NORMAL
EKG P AXIS: 56 DEGREES
EKG P-R INTERVAL: 132 MS
EKG Q-T INTERVAL: 324 MS
EKG QRS DURATION: 96 MS
EKG QTC CALCULATION (BAZETT): 454 MS
EKG R AXIS: -2 DEGREES
EKG T AXIS: 50 DEGREES
EKG VENTRICULAR RATE: 118 BPM

## 2020-03-02 NOTE — H&P
General Surgery - H&P  Dr. Sulma Gonsales PA-C      HISTORY OF PRESENT ILLNESS:       26-year-old female with past medical history of anemia secondary to menorrhagia and cholelithiasis who presents to clinic today to discuss possible thyroid surgery. She is here post u/s of thyroid for graves disease. This was done at [facility]. It shows no nodules but hypervascularity consistent with Graves disease.      She reports her past surgical history is consistent with  and cholecystectomy. She reports an allergy to penicillin, itching and morphine, unknown. She denies smoking and alcohol use. She does report a sister with thyroid disease however, she is unclear what kind.   Her sister has had thyroid surgery in the past.     Past Medical History:    Past Medical History        Past Medical History:   Diagnosis Date    Anemia 2010    Gall bladder stones      UTI (lower urinary tract infection)       chronic UTI's with this pregnancy            Past Surgical History:    Past Surgical History         Past Surgical History:   Procedure Laterality Date     SECTION         SECTION        CHOLECYSTECTOMY        HERNIA REPAIR        HERNIA REPAIR        LAPAROSCOPY   13     diagnostic    UPPER GASTROINTESTINAL ENDOSCOPY                Current Medications:   Current Facility-Administered Medications          Current Outpatient Medications   Medication Sig Dispense Refill    Iodine Strong, Lugols, (STRONG IODINE) 5 % solution Take 0.2 mLs by mouth 3 times daily For 7 days pre-op 1 Bottle 3    sodium chloride (OCEAN) 0.65 % nasal spray 1 spray by Nasal route as needed for Congestion 1 Bottle 3    dicyclomine (BENTYL) 10 MG capsule Take 2 capsules by mouth 4 times daily (before meals and nightly) 30 capsule 0    medroxyPROGESTERone (DEPO-PROVERA) 150 MG/ML injection INJECT 1 MILLILITER (150 MG) BY INTRAMUSCULAR ROUTE EVERY 3 MONTHS FOR 90 DAYS   0    ferrous sulfate 325 (65 FE) MG change. Negative for chills and fever. HENT: Positive for ear pain. Negative for sinus pressure, sore throat, trouble swallowing and voice change. Eyes: Negative for pain and redness. Respiratory: Negative for chest tightness and shortness of breath. Cardiovascular: Negative for chest pain and palpitations. Gastrointestinal: Negative for abdominal distention, abdominal pain, diarrhea and nausea. Endocrine: Negative for cold intolerance and heat intolerance. Genitourinary: Negative for flank pain and frequency. Musculoskeletal: Positive for arthralgias and back pain. Skin: Negative for color change and pallor. Neurological: Negative for dizziness, seizures and facial asymmetry. Hematological: Negative for adenopathy. Does not bruise/bleed easily. Psychiatric/Behavioral: Negative for agitation and behavioral problems.         PHYSICAL EXAM:  Vitals       Vitals:     02/13/20 1256   BP: 130/84   Pulse: 112   Weight: 261 lb 11.2 oz (118.7 kg)   Height: 5' 2\" (1.575 m)            Physical Exam  Constitutional:       Appearance: Normal appearance. HENT:      Head: Normocephalic and atraumatic. Right Ear: External ear normal.      Left Ear: External ear normal.      Nose: Nose normal.      Mouth/Throat:      Mouth: Mucous membranes are moist.   Eyes:      Extraocular Movements: Extraocular movements intact. Pupils: Pupils are equal, round, and reactive to light. Neck:      Musculoskeletal: Neck supple. No neck rigidity or muscular tenderness. Comments: No significant goiter palpable. Her left thyroid appears more full than the right. Mild palpable nodularity on the left. Cardiovascular:      Rate and Rhythm: Tachycardia present. Pulmonary:      Effort: Pulmonary effort is normal.   Abdominal:      General: There is no distension. Tenderness: There is no abdominal tenderness. There is no right CVA tenderness or guarding.    Lymphadenopathy:      Cervical: No cervical adenopathy. Skin:     General: Skin is warm. Neurological:      General: No focal deficit present. Mental Status: She is alert and oriented to person, place, and time. Psychiatric:         Mood and Affect: Mood normal.         Behavior: Behavior normal.               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 She is currently unable to take PTU secondary to nausea and vomiting. She recently received a prescription for Zofran and states that she will attempt to take the medication again. She does have mild sinus tachycardia with a heart rate of 116 bpm in the office today. She has not yet seen a cardiologist.     She reports her last ultrasound of the neck was in 2018. She also reports that she recently had labs drawn by Dr. Leeanne Bradford office in January.      Lastly, she complains of significant back and bone pain. She denies any history of kidney stones however, she does have a history of gallstones. She also reports being slightly more emotional lately.     PLAN:     U/s is consistent with Graves disease  Will proceed with subtotal thyroidectomy. Risks, benefits and alternatives to treatment have been discussed with the pt, who has elected to proceed.          Brett Kramer PA-C

## 2020-03-03 ENCOUNTER — ANESTHESIA (OUTPATIENT)
Dept: OPERATING ROOM | Age: 30
End: 2020-03-03
Payer: COMMERCIAL

## 2020-03-03 ENCOUNTER — HOSPITAL ENCOUNTER (OUTPATIENT)
Age: 30
Setting detail: OBSERVATION
Discharge: HOME | End: 2020-03-04
Attending: SURGERY | Admitting: SURGERY
Payer: COMMERCIAL

## 2020-03-03 VITALS
TEMPERATURE: 98.8 F | DIASTOLIC BLOOD PRESSURE: 89 MMHG | SYSTOLIC BLOOD PRESSURE: 111 MMHG | OXYGEN SATURATION: 100 % | RESPIRATION RATE: 15 BRPM

## 2020-03-03 PROBLEM — E05.00 GRAVES DISEASE: Status: ACTIVE | Noted: 2020-03-03

## 2020-03-03 LAB
PREGNANCY TEST URINE, POC: NEGATIVE
PREGNANCY TEST URINE, POC: NORMAL

## 2020-03-03 PROCEDURE — 7100000000 HC PACU RECOVERY - FIRST 15 MIN: Performed by: SURGERY

## 2020-03-03 PROCEDURE — 88307 TISSUE EXAM BY PATHOLOGIST: CPT

## 2020-03-03 PROCEDURE — 6360000002 HC RX W HCPCS: Performed by: NURSE ANESTHETIST, CERTIFIED REGISTERED

## 2020-03-03 PROCEDURE — 3700000000 HC ANESTHESIA ATTENDED CARE: Performed by: SURGERY

## 2020-03-03 PROCEDURE — 2720000010 HC SURG SUPPLY STERILE: Performed by: SURGERY

## 2020-03-03 PROCEDURE — 2580000003 HC RX 258

## 2020-03-03 PROCEDURE — 6370000000 HC RX 637 (ALT 250 FOR IP): Performed by: PHYSICIAN ASSISTANT

## 2020-03-03 PROCEDURE — 2580000003 HC RX 258: Performed by: PHYSICIAN ASSISTANT

## 2020-03-03 PROCEDURE — 7100000001 HC PACU RECOVERY - ADDTL 15 MIN: Performed by: SURGERY

## 2020-03-03 PROCEDURE — 3600000003 HC SURGERY LEVEL 3 BASE: Performed by: SURGERY

## 2020-03-03 PROCEDURE — 3700000001 HC ADD 15 MINUTES (ANESTHESIA): Performed by: SURGERY

## 2020-03-03 PROCEDURE — 6360000002 HC RX W HCPCS: Performed by: PHYSICIAN ASSISTANT

## 2020-03-03 PROCEDURE — 94761 N-INVAS EAR/PLS OXIMETRY MLT: CPT

## 2020-03-03 PROCEDURE — 60240 REMOVAL OF THYROID: CPT | Performed by: PHYSICIAN ASSISTANT

## 2020-03-03 PROCEDURE — 2500000003 HC RX 250 WO HCPCS: Performed by: NURSE ANESTHETIST, CERTIFIED REGISTERED

## 2020-03-03 PROCEDURE — 2709999900 HC NON-CHARGEABLE SUPPLY: Performed by: SURGERY

## 2020-03-03 PROCEDURE — 81025 URINE PREGNANCY TEST: CPT

## 2020-03-03 PROCEDURE — 6360000002 HC RX W HCPCS: Performed by: ANESTHESIOLOGY

## 2020-03-03 PROCEDURE — C9113 INJ PANTOPRAZOLE SODIUM, VIA: HCPCS | Performed by: PHYSICIAN ASSISTANT

## 2020-03-03 PROCEDURE — 3600000013 HC SURGERY LEVEL 3 ADDTL 15MIN: Performed by: SURGERY

## 2020-03-03 PROCEDURE — G0378 HOSPITAL OBSERVATION PER HR: HCPCS

## 2020-03-03 PROCEDURE — 60240 REMOVAL OF THYROID: CPT | Performed by: SURGERY

## 2020-03-03 PROCEDURE — 2580000003 HC RX 258: Performed by: ANESTHESIOLOGY

## 2020-03-03 PROCEDURE — 2700000000 HC OXYGEN THERAPY PER DAY

## 2020-03-03 RX ORDER — PANTOPRAZOLE SODIUM 40 MG/10ML
40 INJECTION, POWDER, LYOPHILIZED, FOR SOLUTION INTRAVENOUS DAILY
Status: DISCONTINUED | OUTPATIENT
Start: 2020-03-03 | End: 2020-03-04 | Stop reason: HOSPADM

## 2020-03-03 RX ORDER — SODIUM CHLORIDE 0.9 % (FLUSH) 0.9 %
10 SYRINGE (ML) INJECTION EVERY 12 HOURS SCHEDULED
Status: DISCONTINUED | OUTPATIENT
Start: 2020-03-03 | End: 2020-03-04 | Stop reason: HOSPADM

## 2020-03-03 RX ORDER — SODIUM CHLORIDE, SODIUM LACTATE, POTASSIUM CHLORIDE, CALCIUM CHLORIDE 600; 310; 30; 20 MG/100ML; MG/100ML; MG/100ML; MG/100ML
INJECTION, SOLUTION INTRAVENOUS
Status: COMPLETED
Start: 2020-03-03 | End: 2020-03-03

## 2020-03-03 RX ORDER — ONDANSETRON 2 MG/ML
INJECTION INTRAMUSCULAR; INTRAVENOUS PRN
Status: DISCONTINUED | OUTPATIENT
Start: 2020-03-03 | End: 2020-03-03 | Stop reason: SDUPTHER

## 2020-03-03 RX ORDER — FENTANYL CITRATE 50 UG/ML
25 INJECTION, SOLUTION INTRAMUSCULAR; INTRAVENOUS EVERY 5 MIN PRN
Status: DISCONTINUED | OUTPATIENT
Start: 2020-03-03 | End: 2020-03-03 | Stop reason: HOSPADM

## 2020-03-03 RX ORDER — PROPRANOLOL HYDROCHLORIDE 10 MG/1
20 TABLET ORAL 3 TIMES DAILY
Status: DISCONTINUED | OUTPATIENT
Start: 2020-03-03 | End: 2020-03-04 | Stop reason: HOSPADM

## 2020-03-03 RX ORDER — LABETALOL 20 MG/4 ML (5 MG/ML) INTRAVENOUS SYRINGE
5 EVERY 10 MIN PRN
Status: DISCONTINUED | OUTPATIENT
Start: 2020-03-03 | End: 2020-03-03 | Stop reason: HOSPADM

## 2020-03-03 RX ORDER — DEXAMETHASONE SODIUM PHOSPHATE 4 MG/ML
INJECTION, SOLUTION INTRA-ARTICULAR; INTRALESIONAL; INTRAMUSCULAR; INTRAVENOUS; SOFT TISSUE PRN
Status: DISCONTINUED | OUTPATIENT
Start: 2020-03-03 | End: 2020-03-03 | Stop reason: SDUPTHER

## 2020-03-03 RX ORDER — LIDOCAINE HYDROCHLORIDE 20 MG/ML
INJECTION, SOLUTION INTRAVENOUS PRN
Status: DISCONTINUED | OUTPATIENT
Start: 2020-03-03 | End: 2020-03-03 | Stop reason: SDUPTHER

## 2020-03-03 RX ORDER — PROPOFOL 10 MG/ML
INJECTION, EMULSION INTRAVENOUS PRN
Status: DISCONTINUED | OUTPATIENT
Start: 2020-03-03 | End: 2020-03-03 | Stop reason: SDUPTHER

## 2020-03-03 RX ORDER — MIDAZOLAM HYDROCHLORIDE 1 MG/ML
INJECTION INTRAMUSCULAR; INTRAVENOUS PRN
Status: DISCONTINUED | OUTPATIENT
Start: 2020-03-03 | End: 2020-03-03 | Stop reason: SDUPTHER

## 2020-03-03 RX ORDER — SODIUM CHLORIDE 9 MG/ML
INJECTION, SOLUTION INTRAVENOUS CONTINUOUS
Status: DISCONTINUED | OUTPATIENT
Start: 2020-03-03 | End: 2020-03-04 | Stop reason: HOSPADM

## 2020-03-03 RX ORDER — LEVOFLOXACIN 5 MG/ML
500 INJECTION, SOLUTION INTRAVENOUS ONCE
Status: COMPLETED | OUTPATIENT
Start: 2020-03-03 | End: 2020-03-03

## 2020-03-03 RX ORDER — OXYCODONE HYDROCHLORIDE 5 MG/1
5 TABLET ORAL EVERY 4 HOURS PRN
Status: DISCONTINUED | OUTPATIENT
Start: 2020-03-03 | End: 2020-03-04 | Stop reason: HOSPADM

## 2020-03-03 RX ORDER — HYDRALAZINE HYDROCHLORIDE 20 MG/ML
5 INJECTION INTRAMUSCULAR; INTRAVENOUS EVERY 10 MIN PRN
Status: DISCONTINUED | OUTPATIENT
Start: 2020-03-03 | End: 2020-03-03 | Stop reason: HOSPADM

## 2020-03-03 RX ORDER — DIPHENHYDRAMINE HYDROCHLORIDE 50 MG/ML
12.5 INJECTION INTRAMUSCULAR; INTRAVENOUS ONCE
Status: COMPLETED | OUTPATIENT
Start: 2020-03-03 | End: 2020-03-03

## 2020-03-03 RX ORDER — SODIUM CHLORIDE, SODIUM LACTATE, POTASSIUM CHLORIDE, CALCIUM CHLORIDE 600; 310; 30; 20 MG/100ML; MG/100ML; MG/100ML; MG/100ML
INJECTION, SOLUTION INTRAVENOUS ONCE
Status: COMPLETED | OUTPATIENT
Start: 2020-03-03 | End: 2020-03-03

## 2020-03-03 RX ORDER — ONDANSETRON 2 MG/ML
4 INJECTION INTRAMUSCULAR; INTRAVENOUS EVERY 6 HOURS PRN
Status: DISCONTINUED | OUTPATIENT
Start: 2020-03-03 | End: 2020-03-04 | Stop reason: HOSPADM

## 2020-03-03 RX ORDER — HYDROMORPHONE HCL 110MG/55ML
PATIENT CONTROLLED ANALGESIA SYRINGE INTRAVENOUS PRN
Status: DISCONTINUED | OUTPATIENT
Start: 2020-03-03 | End: 2020-03-03 | Stop reason: SDUPTHER

## 2020-03-03 RX ORDER — FENTANYL CITRATE 50 UG/ML
INJECTION, SOLUTION INTRAMUSCULAR; INTRAVENOUS PRN
Status: DISCONTINUED | OUTPATIENT
Start: 2020-03-03 | End: 2020-03-03 | Stop reason: SDUPTHER

## 2020-03-03 RX ORDER — PROMETHAZINE HYDROCHLORIDE 25 MG/ML
6.25 INJECTION, SOLUTION INTRAMUSCULAR; INTRAVENOUS
Status: COMPLETED | OUTPATIENT
Start: 2020-03-03 | End: 2020-03-03

## 2020-03-03 RX ORDER — HYDROMORPHONE HCL 110MG/55ML
0.25 PATIENT CONTROLLED ANALGESIA SYRINGE INTRAVENOUS EVERY 5 MIN PRN
Status: DISCONTINUED | OUTPATIENT
Start: 2020-03-03 | End: 2020-03-03 | Stop reason: HOSPADM

## 2020-03-03 RX ORDER — HYDROMORPHONE HCL 110MG/55ML
0.5 PATIENT CONTROLLED ANALGESIA SYRINGE INTRAVENOUS EVERY 5 MIN PRN
Status: DISCONTINUED | OUTPATIENT
Start: 2020-03-03 | End: 2020-03-03 | Stop reason: HOSPADM

## 2020-03-03 RX ORDER — SODIUM CHLORIDE 0.9 % (FLUSH) 0.9 %
10 SYRINGE (ML) INJECTION PRN
Status: DISCONTINUED | OUTPATIENT
Start: 2020-03-03 | End: 2020-03-04 | Stop reason: HOSPADM

## 2020-03-03 RX ORDER — PROPRANOLOL HYDROCHLORIDE 10 MG/1
40 TABLET ORAL 3 TIMES DAILY
Status: DISCONTINUED | OUTPATIENT
Start: 2020-03-03 | End: 2020-03-03

## 2020-03-03 RX ORDER — ONDANSETRON 4 MG/1
4 TABLET, ORALLY DISINTEGRATING ORAL EVERY 8 HOURS PRN
Status: DISCONTINUED | OUTPATIENT
Start: 2020-03-03 | End: 2020-03-04 | Stop reason: HOSPADM

## 2020-03-03 RX ORDER — ROCURONIUM BROMIDE 10 MG/ML
INJECTION, SOLUTION INTRAVENOUS PRN
Status: DISCONTINUED | OUTPATIENT
Start: 2020-03-03 | End: 2020-03-03 | Stop reason: SDUPTHER

## 2020-03-03 RX ADMIN — PANTOPRAZOLE SODIUM 40 MG: 40 INJECTION, POWDER, FOR SOLUTION INTRAVENOUS at 10:24

## 2020-03-03 RX ADMIN — LIDOCAINE HYDROCHLORIDE 100 MG: 20 INJECTION, SOLUTION INTRAVENOUS at 07:37

## 2020-03-03 RX ADMIN — ROCURONIUM BROMIDE 50 MG: 10 INJECTION INTRAVENOUS at 07:37

## 2020-03-03 RX ADMIN — SODIUM CHLORIDE, POTASSIUM CHLORIDE, SODIUM LACTATE AND CALCIUM CHLORIDE: 600; 310; 30; 20 INJECTION, SOLUTION INTRAVENOUS at 07:34

## 2020-03-03 RX ADMIN — SODIUM CHLORIDE: 9 INJECTION, SOLUTION INTRAVENOUS at 23:56

## 2020-03-03 RX ADMIN — PROPOFOL 200 MG: 10 INJECTION, EMULSION INTRAVENOUS at 07:37

## 2020-03-03 RX ADMIN — HYDROMORPHONE HYDROCHLORIDE 0.5 MG: 2 INJECTION INTRAMUSCULAR; INTRAVENOUS; SUBCUTANEOUS at 08:08

## 2020-03-03 RX ADMIN — SODIUM CHLORIDE, POTASSIUM CHLORIDE, SODIUM LACTATE AND CALCIUM CHLORIDE: 600; 310; 30; 20 INJECTION, SOLUTION INTRAVENOUS at 08:42

## 2020-03-03 RX ADMIN — FENTANYL CITRATE 100 MCG: 50 INJECTION INTRAMUSCULAR; INTRAVENOUS at 07:37

## 2020-03-03 RX ADMIN — OXYCODONE HYDROCHLORIDE 5 MG: 5 TABLET ORAL at 20:16

## 2020-03-03 RX ADMIN — DIPHENHYDRAMINE HYDROCHLORIDE 12.5 MG: 50 INJECTION, SOLUTION INTRAMUSCULAR; INTRAVENOUS at 10:42

## 2020-03-03 RX ADMIN — HYDROMORPHONE HYDROCHLORIDE 0.5 MG: 2 INJECTION INTRAMUSCULAR; INTRAVENOUS; SUBCUTANEOUS at 07:57

## 2020-03-03 RX ADMIN — SODIUM CHLORIDE: 9 INJECTION, SOLUTION INTRAVENOUS at 09:25

## 2020-03-03 RX ADMIN — LEVOFLOXACIN 500 MG: 5 INJECTION, SOLUTION INTRAVENOUS at 07:38

## 2020-03-03 RX ADMIN — PROMETHAZINE HYDROCHLORIDE 6.25 MG: 25 INJECTION INTRAMUSCULAR; INTRAVENOUS at 09:10

## 2020-03-03 RX ADMIN — DEXAMETHASONE SODIUM PHOSPHATE 8 MG: 4 INJECTION, SOLUTION INTRAMUSCULAR; INTRAVENOUS at 07:45

## 2020-03-03 RX ADMIN — SUGAMMADEX 50 MG: 100 INJECTION, SOLUTION INTRAVENOUS at 08:49

## 2020-03-03 RX ADMIN — MIDAZOLAM 2 MG: 1 INJECTION INTRAMUSCULAR; INTRAVENOUS at 07:28

## 2020-03-03 RX ADMIN — SODIUM CHLORIDE, POTASSIUM CHLORIDE, SODIUM LACTATE AND CALCIUM CHLORIDE 1000 ML: 600; 310; 30; 20 INJECTION, SOLUTION INTRAVENOUS at 07:04

## 2020-03-03 RX ADMIN — ROCURONIUM BROMIDE 20 MG: 10 INJECTION INTRAVENOUS at 08:08

## 2020-03-03 RX ADMIN — FENTANYL CITRATE 25 MCG: 50 INJECTION, SOLUTION INTRAMUSCULAR; INTRAVENOUS at 14:10

## 2020-03-03 RX ADMIN — ROCURONIUM BROMIDE 10 MG: 10 INJECTION INTRAVENOUS at 08:34

## 2020-03-03 RX ADMIN — SUGAMMADEX 150 MG: 100 INJECTION, SOLUTION INTRAVENOUS at 08:54

## 2020-03-03 RX ADMIN — ONDANSETRON 4 MG: 2 INJECTION INTRAMUSCULAR; INTRAVENOUS at 08:41

## 2020-03-03 ASSESSMENT — PAIN SCALES - GENERAL
PAINLEVEL_OUTOF10: 4
PAINLEVEL_OUTOF10: 6
PAINLEVEL_OUTOF10: 5
PAINLEVEL_OUTOF10: 6

## 2020-03-03 ASSESSMENT — PULMONARY FUNCTION TESTS
PIF_VALUE: 21
PIF_VALUE: 22
PIF_VALUE: 21
PIF_VALUE: 23
PIF_VALUE: 21
PIF_VALUE: 15
PIF_VALUE: 22
PIF_VALUE: 21
PIF_VALUE: 22
PIF_VALUE: 5
PIF_VALUE: 21
PIF_VALUE: 20
PIF_VALUE: 17
PIF_VALUE: 23
PIF_VALUE: 22
PIF_VALUE: 21
PIF_VALUE: 20
PIF_VALUE: 22
PIF_VALUE: 22
PIF_VALUE: 4
PIF_VALUE: 22
PIF_VALUE: 23
PIF_VALUE: 21
PIF_VALUE: 20
PIF_VALUE: 21
PIF_VALUE: 22
PIF_VALUE: 3
PIF_VALUE: 23
PIF_VALUE: 19
PIF_VALUE: 19
PIF_VALUE: 21
PIF_VALUE: 22
PIF_VALUE: 21
PIF_VALUE: 22
PIF_VALUE: 23
PIF_VALUE: 22
PIF_VALUE: 5
PIF_VALUE: 20
PIF_VALUE: 24
PIF_VALUE: 22
PIF_VALUE: 21
PIF_VALUE: 4
PIF_VALUE: 18
PIF_VALUE: 21
PIF_VALUE: 22
PIF_VALUE: 21
PIF_VALUE: 22
PIF_VALUE: 28
PIF_VALUE: 22
PIF_VALUE: 21
PIF_VALUE: 3
PIF_VALUE: 19
PIF_VALUE: 22
PIF_VALUE: 19
PIF_VALUE: 21
PIF_VALUE: 19
PIF_VALUE: 21
PIF_VALUE: 22
PIF_VALUE: 22
PIF_VALUE: 19
PIF_VALUE: 21
PIF_VALUE: 21
PIF_VALUE: 22
PIF_VALUE: 21
PIF_VALUE: 19
PIF_VALUE: 23
PIF_VALUE: 21
PIF_VALUE: 21
PIF_VALUE: 7
PIF_VALUE: 1
PIF_VALUE: 21
PIF_VALUE: 5
PIF_VALUE: 21

## 2020-03-03 ASSESSMENT — PAIN DESCRIPTION - PAIN TYPE
TYPE: SURGICAL PAIN
TYPE: SURGICAL PAIN

## 2020-03-03 ASSESSMENT — PAIN DESCRIPTION - DESCRIPTORS
DESCRIPTORS: BURNING;SHARP
DESCRIPTORS: DISCOMFORT

## 2020-03-03 ASSESSMENT — PAIN DESCRIPTION - FREQUENCY
FREQUENCY: CONTINUOUS
FREQUENCY: OTHER (COMMENT)

## 2020-03-03 ASSESSMENT — PAIN - FUNCTIONAL ASSESSMENT: PAIN_FUNCTIONAL_ASSESSMENT: 0-10

## 2020-03-03 ASSESSMENT — PAIN DESCRIPTION - LOCATION
LOCATION: NECK
LOCATION: NECK

## 2020-03-03 NOTE — ANESTHESIA POSTPROCEDURE EVALUATION
Department of Anesthesiology  Postprocedure Note    Patient: Elly Van  MRN: [id number]  YOB: 1990  Date of evaluation: 3/3/2020  Time:  9:04 AM     Procedure Summary     Date:  03/03/20 Room / Location:  [address] [place] [address]    Anesthesia Start:  0639 Anesthesia Stop:  0950    Procedure:  TOTAL THYROIDECTOMY (Bilateral Neck) Diagnosis:  (GRAVES DISEASE)    Surgeon:  Tasha Luis MD Responsible Provider:  Love Hardy MD    Anesthesia Type:  general ASA Status:  3          Anesthesia Type: general    Jose Luis Phase I: Jose Luis Score: 10    Jose Luis Phase II:      Last vitals: Reviewed and per EMR flowsheets.        Anesthesia Post Evaluation    Patient location during evaluation: PACU  Patient participation: waiting for patient participation  Level of consciousness: sleepy but conscious  Airway patency: patent  Nausea & Vomiting: no vomiting and no nausea  Complications: no  Cardiovascular status: hemodynamically stable and blood pressure returned to baseline  Respiratory status: acceptable, face mask, nonlabored ventilation and spontaneous ventilation  Hydration status: stable

## 2020-03-03 NOTE — OP NOTE
6 cm transverse cervical incision was created above the sternal notch within a natural skin fold. The strap muscles were identified and divided at the midline. Sharp dissection was used to mobilize the left thyroid lobe in a medial direction. Dissection continued posteriorly to expose the tracheoesophageal groove and right carotid artery. The recurrent laryngeal nerve was identified and preserved. The thyroid lobe was mobilized further and the superior and inferior pole vessels were divided with the harmonic scalpel. The middle thyroid vein was divided with the harmonic scalpel. Small vessels were likewise divided. The gland was rotated in a medial direction and taken off the trachea using the harmonic scalpel. The isthmus was removed off the thyroid cartilage using the harmonic scalpel. In similar fashion the right thyroid was dissected and removed without difficulty. A suture was placed for orientation purposes. The specimen was submitted to pathology. The wound was irrigated and inspected carefully. The left upper and lower as and right upper parathyroid tissue were found to be viable. The strap muscles were closed with interrupted 4-0 Vicryl suture. The platysma was closed with interrupted 4-0 Vicryl suture, and the skin incision was closed with a 5-0 Vicryl subcuticular closure. A Steri-Strips was applied across the incision. Instrument, sponge, and needle counts were correct prior to closure and at the conclusion of the case.      Dipak Sagastume MD

## 2020-03-04 VITALS
OXYGEN SATURATION: 99 % | TEMPERATURE: 97.4 F | WEIGHT: 258 LBS | HEART RATE: 83 BPM | DIASTOLIC BLOOD PRESSURE: 56 MMHG | BODY MASS INDEX: 45.71 KG/M2 | HEIGHT: 63 IN | RESPIRATION RATE: 16 BRPM | SYSTOLIC BLOOD PRESSURE: 120 MMHG

## 2020-03-04 LAB
ALBUMIN SERPL-MCNC: 3.4 GM/DL (ref 3.4–5)
ALP BLD-CCNC: 119 IU/L (ref 40–129)
ALT SERPL-CCNC: 14 U/L (ref 10–40)
ANION GAP SERPL CALCULATED.3IONS-SCNC: 8 MMOL/L (ref 4–16)
AST SERPL-CCNC: 13 IU/L (ref 15–37)
BASOPHILS ABSOLUTE: 0 K/CU MM
BASOPHILS RELATIVE PERCENT: 0.1 % (ref 0–1)
BILIRUB SERPL-MCNC: 0.3 MG/DL (ref 0–1)
BUN BLDV-MCNC: 8 MG/DL (ref 6–23)
CALCIUM SERPL-MCNC: 8.7 MG/DL (ref 8.3–10.6)
CHLORIDE BLD-SCNC: 106 MMOL/L (ref 99–110)
CO2: 25 MMOL/L (ref 21–32)
CREAT SERPL-MCNC: 0.4 MG/DL (ref 0.6–1.1)
DIFFERENTIAL TYPE: ABNORMAL
EOSINOPHILS ABSOLUTE: 0 K/CU MM
EOSINOPHILS RELATIVE PERCENT: 0 % (ref 0–3)
GFR AFRICAN AMERICAN: >60 ML/MIN/1.73M2
GFR NON-AFRICAN AMERICAN: >60 ML/MIN/1.73M2
GLUCOSE BLD-MCNC: 142 MG/DL (ref 70–99)
HCT VFR BLD CALC: 33.4 % (ref 37–47)
HEMOGLOBIN: 9.6 GM/DL (ref 12.5–16)
IMMATURE NEUTROPHIL %: 0.4 % (ref 0–0.43)
LYMPHOCYTES ABSOLUTE: 1.8 K/CU MM
LYMPHOCYTES RELATIVE PERCENT: 13.3 % (ref 24–44)
MCH RBC QN AUTO: 21.1 PG (ref 27–31)
MCHC RBC AUTO-ENTMCNC: 28.7 % (ref 32–36)
MCV RBC AUTO: 73.4 FL (ref 78–100)
MONOCYTES ABSOLUTE: 0.5 K/CU MM
MONOCYTES RELATIVE PERCENT: 3.9 % (ref 0–4)
NUCLEATED RBC %: 0 %
PDW BLD-RTO: 15.9 % (ref 11.7–14.9)
PLATELET # BLD: 377 K/CU MM (ref 140–440)
PMV BLD AUTO: 10.2 FL (ref 7.5–11.1)
POTASSIUM SERPL-SCNC: 4.4 MMOL/L (ref 3.5–5.1)
RBC # BLD: 4.55 M/CU MM (ref 4.2–5.4)
SEGMENTED NEUTROPHILS ABSOLUTE COUNT: 11.4 K/CU MM
SEGMENTED NEUTROPHILS RELATIVE PERCENT: 82.3 % (ref 36–66)
SODIUM BLD-SCNC: 139 MMOL/L (ref 135–145)
T3 FREE: 2.5 PG/ML (ref 2.3–4.2)
T4 FREE: 2.03 NG/DL (ref 0.9–1.8)
TOTAL IMMATURE NEUTOROPHIL: 0.06 K/CU MM
TOTAL NUCLEATED RBC: 0 K/CU MM
TOTAL PROTEIN: 6.7 GM/DL (ref 6.4–8.2)
TSH HIGH SENSITIVITY: <0.01 UIU/ML (ref 0.27–4.2)
WBC # BLD: 13.8 K/CU MM (ref 4–10.5)

## 2020-03-04 PROCEDURE — 80053 COMPREHEN METABOLIC PANEL: CPT

## 2020-03-04 PROCEDURE — 6370000000 HC RX 637 (ALT 250 FOR IP): Performed by: PHYSICIAN ASSISTANT

## 2020-03-04 PROCEDURE — 85025 COMPLETE CBC W/AUTO DIFF WBC: CPT

## 2020-03-04 PROCEDURE — 84481 FREE ASSAY (FT-3): CPT

## 2020-03-04 PROCEDURE — 94761 N-INVAS EAR/PLS OXIMETRY MLT: CPT

## 2020-03-04 PROCEDURE — C9113 INJ PANTOPRAZOLE SODIUM, VIA: HCPCS | Performed by: PHYSICIAN ASSISTANT

## 2020-03-04 PROCEDURE — 94150 VITAL CAPACITY TEST: CPT

## 2020-03-04 PROCEDURE — 2580000003 HC RX 258: Performed by: PHYSICIAN ASSISTANT

## 2020-03-04 PROCEDURE — 82330 ASSAY OF CALCIUM: CPT

## 2020-03-04 PROCEDURE — 84443 ASSAY THYROID STIM HORMONE: CPT

## 2020-03-04 PROCEDURE — 6360000002 HC RX W HCPCS: Performed by: PHYSICIAN ASSISTANT

## 2020-03-04 PROCEDURE — 96374 THER/PROPH/DIAG INJ IV PUSH: CPT

## 2020-03-04 PROCEDURE — 83970 ASSAY OF PARATHORMONE: CPT

## 2020-03-04 PROCEDURE — 84439 ASSAY OF FREE THYROXINE: CPT

## 2020-03-04 PROCEDURE — 96372 THER/PROPH/DIAG INJ SC/IM: CPT

## 2020-03-04 PROCEDURE — G0378 HOSPITAL OBSERVATION PER HR: HCPCS

## 2020-03-04 PROCEDURE — 99024 POSTOP FOLLOW-UP VISIT: CPT | Performed by: PHYSICIAN ASSISTANT

## 2020-03-04 PROCEDURE — 36415 COLL VENOUS BLD VENIPUNCTURE: CPT

## 2020-03-04 RX ORDER — CALCIUM CARBONATE 200(500)MG
1 TABLET,CHEWABLE ORAL 2 TIMES DAILY PRN
Qty: 60 TABLET | Refills: 0 | Status: SHIPPED | OUTPATIENT
Start: 2020-03-04 | End: 2020-03-04 | Stop reason: SDUPTHER

## 2020-03-04 RX ORDER — OXYCODONE HYDROCHLORIDE 5 MG/1
5 TABLET ORAL EVERY 6 HOURS PRN
Qty: 20 TABLET | Refills: 0 | Status: SHIPPED | OUTPATIENT
Start: 2020-03-04 | End: 2020-03-07

## 2020-03-04 RX ORDER — CALCIUM CARBONATE 200(500)MG
2 TABLET,CHEWABLE ORAL 2 TIMES DAILY PRN
Qty: 60 TABLET | Refills: 0
Start: 2020-03-04 | End: 2020-04-03

## 2020-03-04 RX ADMIN — OXYCODONE HYDROCHLORIDE 5 MG: 5 TABLET ORAL at 04:09

## 2020-03-04 RX ADMIN — ENOXAPARIN SODIUM 40 MG: 40 INJECTION SUBCUTANEOUS at 08:36

## 2020-03-04 RX ADMIN — SODIUM CHLORIDE, PRESERVATIVE FREE 10 ML: 5 INJECTION INTRAVENOUS at 08:35

## 2020-03-04 RX ADMIN — PANTOPRAZOLE SODIUM 40 MG: 40 INJECTION, POWDER, FOR SOLUTION INTRAVENOUS at 08:35

## 2020-03-04 RX ADMIN — PROPRANOLOL HYDROCHLORIDE 20 MG: 10 TABLET ORAL at 08:35

## 2020-03-04 ASSESSMENT — PAIN SCALES - GENERAL: PAINLEVEL_OUTOF10: 6

## 2020-03-04 NOTE — DISCHARGE SUMMARY
tablet  Commonly known as: Antacid  Take 2 tablets by mouth 2 times daily as needed for Heartburn Take only if you have numbness or tingling in your hands or feet. oxyCODONE 5 MG immediate release tablet  Commonly known as:  ROXICODONE  Take 1 tablet by mouth every 6 hours as needed for Pain for up to 3 days. CONTINUE taking these medications    ferrous sulfate 325 (65 Fe) MG tablet     ibuprofen 800 MG tablet  Commonly known as:  ADVIL;MOTRIN     ondansetron 4 MG tablet  Commonly known as:  ZOFRAN     propranolol 20 MG tablet  Commonly known as:  INDERAL  Take 2 tablets by mouth 3 times daily for 7 days        STOP taking these medications    strong iodine 5 % solution           Where to Get Your Medications      These medications were sent to [address] [place] [address]. - P [phone] - F [phone]  [address] [place] [address]    Phone:  [phone]   · oxyCODONE 5 MG immediate release tablet     Information about where to get these medications is not yet available    Ask your nurse or doctor about these medications  · calcium carbonate 500 MG chewable tablet         Activity: activity as tolerated, no driving while on analgesics and no heavy lifting for 1-2 weeks    Diet: regular diet    Wound Care: keep wound clean and dry    Follow-up with Dr Frankie Prescott by calling [phone]. The Office staff will determine follow up time per protocol.     Signed:  Felicia Grace PA-C

## 2020-03-04 NOTE — CONSULTS
LMP 2019 (Approximate)   SpO2 97%   BMI 46.44 kg/m²     CONSTITUTIONAL:  awake, alert, cooperative, appears stated age   EYES:  vision intact Fundoscopic Exam not performed   ENT:Normal  NECK:  Supple, No JVD. Thyroid Exam: Versus scar/of thyroidectomy  LUNGS:  Has Vesicular Breath Sounds,   CARDIOVASCULAR:  Normal apical impulse, regular rate and rhythm, normal S1 and S2, no S3 or S4, and has no  murmur   ABDOMEN:  No scars, normal bowel sounds, soft, non-distended, non-tender, no masses palpated, no hepatolienomegaly  Musculoskeletal: Normal  Extremities: Normal, peripheral pulses normal, , has no edema   NEUROLOGIC:  Awake, alert, oriented to name, place and time. Cranial nerves II-XII are grossly intact. Motor is  intact. Sensory is intact. ,  and gait is normal.    DATA:    CBC: No results for input(s): WBC, HGB, PLT in the last 72 hours. CMP:No results for input(s): NA, K, CL, CO2, BUN, CREATININE, CALCIUM, PROT, LABALBU, BILITOT, ALKPHOS, AST, ALT in the last 72 hours. Invalid input(s): GLU  Lipids: No results found for: CHOL, HDL, TRIG  Glucose: No results for input(s): POCGLU in the last 72 hours. Hemoglobin A1C: No results found for: LABA1C  Free T4:   Lab Results   Component Value Date    T4FREE 4.43 2020     Free T3: No results found for: FT3  TSH High Sensitivity:   Lab Results   Component Value Date    TSHHS <0.010 2020       No results found.     Scheduled Medicines   Medications:    sodium chloride flush  10 mL Intravenous 2 times per day    [START ON 3/4/2020] enoxaparin  40 mg Subcutaneous Daily    pantoprazole  40 mg Intravenous Daily    propranolol  20 mg Oral TID      Infusions:    sodium chloride 75 mL/hr at 20 6122         IMPRESSION    Patient Active Problem List   Diagnosis    Cyst of finger    Encounter for supervision of normal pregnancy in multigravida    Previous  delivery affecting pregnancy    Periumbilical abdominal pain    Melena  Rectal bleeding    Iron deficiency anemia    Morbid obesity with BMI of 50.0-59.9, adult (HCC)    Chronic GERD    Fatty liver    Chronic bilateral low back pain without sciatica    Neoplasm of uncertain behavior of skin of thigh    Graves disease         Near total thyroidectomy 3/3/2020      RECOMMENDATIONS:      1. Reviewed POC blood glucose . Labs and X ray results   2. Reviewed Home and Current Medicines   3. Will not to start on Synthroid right away will see her in the office within a week and then decide       Will follow with you  Again thank you for sharing pt's care with me.      Truly yours,       Indio De La Torre MD

## 2020-03-04 NOTE — PLAN OF CARE
Problem: Falls - Risk of:  Goal: Will remain free from falls  Description  Will remain free from falls  3/4/2020 0842 by Dinesh Wagner RN  Outcome: Ongoing  3/4/2020 0005 by Emre Rodriguez LPN  Outcome: Ongoing  Goal: Absence of physical injury  Description  Absence of physical injury  3/4/2020 0842 by Dinesh Wagner RN  Outcome: Ongoing  3/4/2020 0005 by Emre Rodriguez LPN  Outcome: Ongoing

## 2020-03-06 LAB
CALCIUM IONIZED: 4.76 MG/DL (ref 4.48–5.28)
CALCIUM SERPL-MCNC: 8.7 MG/DL (ref 8.3–10.6)
IONIZED CA: 1.19 MMOL/L (ref 1.12–1.32)
PARATHYROID HORMONE INTACT: 31 PG/ML (ref 15–65)
PARATHYROID HORMONE INTACT: NORMAL PG/ML (ref 15–65)

## 2020-03-16 ENCOUNTER — OFFICE VISIT (OUTPATIENT)
Dept: SURGERY | Age: 30
End: 2020-03-16

## 2020-03-16 VITALS
BODY MASS INDEX: 48.38 KG/M2 | HEART RATE: 79 BPM | RESPIRATION RATE: 18 BRPM | SYSTOLIC BLOOD PRESSURE: 126 MMHG | DIASTOLIC BLOOD PRESSURE: 82 MMHG | WEIGHT: 268.8 LBS

## 2020-03-16 PROCEDURE — 99024 POSTOP FOLLOW-UP VISIT: CPT | Performed by: SURGERY

## 2020-03-16 RX ORDER — LEVOTHYROXINE SODIUM 175 UG/1
1 TABLET ORAL DAILY
COMMUNITY
Start: 2020-03-09

## 2020-03-16 NOTE — PROGRESS NOTES
Alcohol use: No    Drug use: No     Types: Marijuana     Comment: quit in 2013    Sexual activity: Yes     Partners: Male   Lifestyle    Physical activity     Days per week: Not on file     Minutes per session: Not on file    Stress: Not on file   Relationships    Social connections     Talks on phone: Not on file     Gets together: Not on file     Attends Yarsani service: Not on file     Active member of club or organization: Not on file     Attends meetings of clubs or organizations: Not on file     Relationship status: Not on file    Intimate partner violence     Fear of current or ex partner: Not on file     Emotionally abused: Not on file     Physically abused: Not on file     Forced sexual activity: Not on file   Other Topics Concern    Not on file   Social History Narrative    Not on file       OBJECTIVE:   Physical Exam    Wound well healed without signs of active infection. Suture line intact. Abdomen soft, nontender, nondistended. Path reveals:   Final Pathologic Diagnosis:  Thyroid; total thyroidectomy:  -     Diffuse follicular hyperplasia and focal lymphoid  aggregates with reactive changes;        clinically Graves disease. -     Benign perithyroidal lymph node tissue showing no  significant histopathologic change. ASSESSMENT:  Patient doing well on this post operative check. Wounds well healed. 1. Graves' disease        PLAN:  Continue same  Increase activity as tolerated        No orders of the defined types were placed in this encounter. No orders of the defined types were placed in this encounter. Follow Up: No follow-ups on file.     Delia Swan MD

## 2020-03-21 NOTE — PROGRESS NOTES
1672 - transferred from OR on cart; monitor applied and alarms on; bedside handoff provided by Jose Martin Garcia RN and Aida Moura CRNA  5706 - patient medicated for complaint of nausea, and alcohol swab placed near nostrils. Patient states she feels she can't breath, breaths sounds regular and even bilaterally, SpO2 >94%, patient encouraged to relax and take deep regular breaths. 7632 - patient resting calmly with eyes closed, respirations regular and even. 3118 - Dr Munir Land updated patient at bedside. 1678 - patient given an ice chip for complaint of dry mouth  0334- father brought to bedside to see patient and updated  1005 - phase one care completed, awaiting room assignment before transferring patient. 1010 - patient belongings brought to bedside from locker 12. Patient states pain is about 6/10 to surgical neck wound, but pain is tolerable; patient does not want pain medication at this time. 36 - father taken back to waiting room, will call when room assignment made. 1030 - gown and linens changed  1042 - patient medicated for complaint of continued itching. 1100 - patient resting comfortably, eyes closed, respirations regular and even, no symptoms of pain or itching.   Wayne Shaw
Patient alert and oriented, Patient to be discharged this shift. Drain removed from neck 4 ml of serosanguinous fluid in tube, drain intact. IV to left forearm removed at this time also intact. No complaints of pain Patient.  tolerated removal well
supervision of normal pregnancy in multigravida     Previous  delivery affecting pregnancy     Periumbilical abdominal pain     Melena     Rectal bleeding     Iron deficiency anemia     Morbid obesity with BMI of 50.0-59.9, adult (HCC)     Chronic GERD     Fatty liver     Chronic bilateral low back pain without sciatica     Neoplasm of uncertain behavior of skin of thigh     Graves disease     Near total thyroidectomy 3/3/2020      Plan:     1. Reviewed POC blood glucose . Labs and X ray results   2. Reviewed Current Medicines   3. Advised to look for symptoms of hypocalcemia   4. Will not start on Synthroid yet until we know the path report  5.  will see in the office in 1 week  6. Will follow     .      Kathleen Jonas MD

## 2020-04-02 ENCOUNTER — TELEPHONE (OUTPATIENT)
Dept: SURGERY | Age: 30
End: 2020-04-02

## 2021-07-26 ENCOUNTER — HOSPITAL ENCOUNTER (OUTPATIENT)
Dept: GENERAL RADIOLOGY | Age: 31
Discharge: HOME | End: 2021-07-26
Payer: COMMERCIAL

## 2021-07-26 ENCOUNTER — HOSPITAL ENCOUNTER (OUTPATIENT)
Age: 31
Discharge: HOME | End: 2021-07-26
Payer: COMMERCIAL

## 2021-07-26 DIAGNOSIS — M79.5 FOREIGN BODY (FB) IN SOFT TISSUE: ICD-10-CM

## 2021-07-26 DIAGNOSIS — Z30.9 PRESENCE OF CONTRACEPTIVE DEVICE: ICD-10-CM

## 2021-07-26 PROCEDURE — 73060 X-RAY EXAM OF HUMERUS: CPT

## 2021-08-17 ENCOUNTER — PROCEDURE VISIT (OUTPATIENT)
Dept: OBGYN | Age: 31
End: 2021-08-17
Payer: COMMERCIAL

## 2021-08-17 VITALS
WEIGHT: 293 LBS | HEIGHT: 65 IN | SYSTOLIC BLOOD PRESSURE: 133 MMHG | DIASTOLIC BLOOD PRESSURE: 80 MMHG | BODY MASS INDEX: 48.82 KG/M2

## 2021-08-17 DIAGNOSIS — Z97.5 NEXPLANON IN PLACE: Primary | ICD-10-CM

## 2021-08-17 DIAGNOSIS — E66.01 MORBID OBESITY (HCC): ICD-10-CM

## 2021-08-17 PROCEDURE — 99213 OFFICE O/P EST LOW 20 MIN: CPT | Performed by: OBSTETRICS & GYNECOLOGY

## 2021-08-17 SDOH — ECONOMIC STABILITY: FOOD INSECURITY: WITHIN THE PAST 12 MONTHS, YOU WORRIED THAT YOUR FOOD WOULD RUN OUT BEFORE YOU GOT MONEY TO BUY MORE.: NEVER TRUE

## 2021-08-17 SDOH — ECONOMIC STABILITY: FOOD INSECURITY: WITHIN THE PAST 12 MONTHS, THE FOOD YOU BOUGHT JUST DIDN'T LAST AND YOU DIDN'T HAVE MONEY TO GET MORE.: NEVER TRUE

## 2021-08-17 ASSESSMENT — PATIENT HEALTH QUESTIONNAIRE - PHQ9
1. LITTLE INTEREST OR PLEASURE IN DOING THINGS: 0
SUM OF ALL RESPONSES TO PHQ QUESTIONS 1-9: 0
SUM OF ALL RESPONSES TO PHQ9 QUESTIONS 1 & 2: 0
SUM OF ALL RESPONSES TO PHQ QUESTIONS 1-9: 0
2. FEELING DOWN, DEPRESSED OR HOPELESS: 0
SUM OF ALL RESPONSES TO PHQ QUESTIONS 1-9: 0

## 2021-08-17 ASSESSMENT — ENCOUNTER SYMPTOMS
GASTROINTESTINAL NEGATIVE: 1
ALLERGIC/IMMUNOLOGIC NEGATIVE: 1
EYES NEGATIVE: 1
RESPIRATORY NEGATIVE: 1

## 2021-08-17 ASSESSMENT — SOCIAL DETERMINANTS OF HEALTH (SDOH): HOW HARD IS IT FOR YOU TO PAY FOR THE VERY BASICS LIKE FOOD, HOUSING, MEDICAL CARE, AND HEATING?: NOT HARD AT ALL

## 2021-08-17 NOTE — PROGRESS NOTES
21    Juani Author Byron  1990    Chief Complaint   Patient presents with    Other     Nexplanon removal left arm        Jamshid Bradshaw is a 31 y.o. female who presents today for evaluation of retained nexplanon. Past Medical History:   Diagnosis Date    Abdominal pain     Abnormal uterine bleeding (AUB)     Acute pelvic pain     Anemia     Depression     Foot ulcer, left ([address] 2017    status post nerve decompression repair, excision of neoplasm, left foot, with muscle flap and graft skin application.     Graves disease Dx: 2019    Dr. Shannan Lyon disease     Hypermenorrhea     Irregular menses     Liver disease     Menorrhagia     Morbid obesity ([address]     Nexplanon removal     Thyroid disorder     UTI (lower urinary tract infection)     chronic UTI's with this pregnancy - last     UTI (urinary tract infection)        Past Surgical History:   Procedure Laterality Date     SECTION      ,     CHOLECYSTECTOMY     [address] HERNIA REPAIR      LAPAROSCOPY  13    diagnostic    THYROIDECTOMY Bilateral 3/3/2020    TOTAL THYROIDECTOMY performed by Rafat Norman MD at [address]  2012    Gastritis -Dr. Roslyn Watters       Social History     Tobacco Use    Smoking status: Former Smoker     Packs/day: 0.10     Years: 1.00     Pack years: 0.10     Types: Cigarettes     Start date:      Quit date: 2013     Years since quittin.6    Smokeless tobacco: Never Used   Vaping Use    Vaping Use: Never used   Substance Use Topics    Alcohol use: No    Drug use: No     Types: Marijuana     Comment: quit in        Family History   Problem Relation Age of Onset    Thyroid Disease Sister     Thyroid Disease Paternal Aunt     Cancer Maternal Grandmother     Cancer Paternal Grandmother     Diabetes Mother     Heart Disease Mother     No Known Problems Father        Current Outpatient motion. Left upper arm: Normal.      Cervical back: Normal range of motion and neck supple. No rigidity. Comments: Probably tip of nexplanon can be palpated but difficult to feel. Skin:     General: Skin is warm and dry. Neurological:      General: No focal deficit present. Mental Status: She is alert and oriented to person, place, and time. Psychiatric:         Mood and Affect: Mood normal.         Behavior: Behavior normal.         No results found for this visit on 08/17/21. ASSESSMENT AND PLAN   Diagnosis Orders   1. Nexplanon in place     2. Morbid obesity ([address]       Discussed attempting to take out versus referral to general surgeon. Due to inability to find at last visit and today probaby can only feel tip of nexplanon, will refer to general surgeon. reviewed exray  No follow-ups on file.     Sneha Ambriz MD

## 2021-08-19 ENCOUNTER — OFFICE VISIT (OUTPATIENT)
Dept: SURGERY | Age: 31
End: 2021-08-19
Payer: COMMERCIAL

## 2021-08-19 VITALS
HEART RATE: 82 BPM | OXYGEN SATURATION: 98 % | DIASTOLIC BLOOD PRESSURE: 74 MMHG | HEIGHT: 66 IN | SYSTOLIC BLOOD PRESSURE: 134 MMHG | RESPIRATION RATE: 16 BRPM | BODY MASS INDEX: 47.09 KG/M2 | WEIGHT: 293 LBS

## 2021-08-19 DIAGNOSIS — M79.5 FOREIGN BODY (FB) IN SOFT TISSUE: Primary | ICD-10-CM

## 2021-08-19 PROCEDURE — 99203 OFFICE O/P NEW LOW 30 MIN: CPT | Performed by: SURGERY

## 2021-08-19 PROCEDURE — 11983 REMOVE/INSERT DRUG IMPLANT: CPT | Performed by: SURGERY

## 2021-08-20 ENCOUNTER — TELEPHONE (OUTPATIENT)
Dept: SURGERY | Age: 31
End: 2021-08-20

## 2021-08-20 NOTE — TELEPHONE ENCOUNTER
[address] (FOREIGN BODY REMOVAL W/ FLUOROSCOPY) SCHEDULED @ [facility].  NOTIFIED OF DATES, TIMES AND LOCATION    PHONE ASSESSMENT   COVID - 8/24/21 @ 745  SURGERY - 8/31/21 @ 1045  P/O - 9/13/21 @ 1015    NPO AFTER MIDNIGHT  HOLD BLOOD THINNERS

## 2021-08-23 DIAGNOSIS — Z01.818 PRE-OP TESTING: Primary | ICD-10-CM

## 2021-08-23 NOTE — PROGRESS NOTES
Surgery 8/31/21   you will be called   8/30/21   with times               1. Do not eat or drink anything after midnight - unless instructed by your doctor prior to surgery. This includes                   no water, chewing gum or mints. 2. Follow your directions as prescribed by the doctor for your procedure and medications. 3. Check with your Doctor regarding stopping Plavix, Coumadin, Lovenox,Effient,Pradaxa,Xarelto, Fragmin or other blood thinners and                   follow their instructions. Pt. To take levothyroxine with a tiny sip of water the morning of surgery. 4. Do not smoke, and do not drink any alcoholic beverages 24 hours prior to surgery. This includes NA Beer. 5. You may brush your teeth and gargle the morning of surgery. DO NOT SWALLOW WATER   6. You MUST make arrangements for a responsible adult to take you home after your surgery and be able to check on you every couple                   hours for the day. You will not be allowed to leave alone or drive yourself home. It is strongly suggested someone stay with you the first 24                   hrs. Your surgery will be cancelled if you do not have a ride home. 7. Please wear simple, loose fitting clothing to the hospital.  Josiah Gastelumley not bring valuables (money, credit cards, checkbooks, etc.) Do not wear any                   makeup (including no eye makeup) or nail polish on your fingers or toes. 8. DO NOT wear any jewelry or piercings on day of surgery. All body piercing jewelry must be removed. 9. If you have dentures, they will be removed before going to the OR; we will provide you a container. If you wear contact lenses or glasses,                  they will be removed; please bring a case for them. 10. If you  have a Living Will and Durable Power of  for Healthcare, please bring in a copy.            11. Please bring picture ID,  insurance card, paperwork from the doctors office    (H & P, Consent, & card for implantable devices). 12. Take a shower the night before or morning of your procedure, do not apply any lotion, oil or powder. 13. Wear a mask covering your nose & mouth when entering the hospital. Have your covid-19 test performed within 10 days of your                  surgery. Quarantine yourself after the test until after your surgery. Pt. States gets covid tested today at work and will bring in results. Pt. Works at a nursing home.

## 2021-08-25 ENCOUNTER — NURSE ONLY (OUTPATIENT)
Dept: SURGERY | Age: 31
End: 2021-08-25
Payer: COMMERCIAL

## 2021-08-25 ENCOUNTER — HOSPITAL ENCOUNTER (OUTPATIENT)
Age: 31
Setting detail: SPECIMEN
Discharge: HOME | End: 2021-08-25
Payer: COMMERCIAL

## 2021-08-25 VITALS — SYSTOLIC BLOOD PRESSURE: 110 MMHG | HEART RATE: 76 BPM | OXYGEN SATURATION: 100 % | DIASTOLIC BLOOD PRESSURE: 70 MMHG

## 2021-08-25 DIAGNOSIS — Z01.818 PRE-OP TESTING: Primary | ICD-10-CM

## 2021-08-25 PROCEDURE — U0003 INFECTIOUS AGENT DETECTION BY NUCLEIC ACID (DNA OR RNA); SEVERE ACUTE RESPIRATORY SYNDROME CORONAVIRUS 2 (SARS-COV-2) (CORONAVIRUS DISEASE [COVID-19]), AMPLIFIED PROBE TECHNIQUE, MAKING USE OF HIGH THROUGHPUT TECHNOLOGIES AS DESCRIBED BY CMS-2020-01-R: HCPCS

## 2021-08-25 PROCEDURE — U0005 INFEC AGEN DETEC AMPLI PROBE: HCPCS

## 2021-08-25 PROCEDURE — 99211 OFF/OP EST MAY X REQ PHY/QHP: CPT | Performed by: SURGERY

## 2021-08-25 NOTE — PATIENT INSTRUCTIONS
Pre-Procedure COVID-19 Self Testing  Quarantine Instructions  Day of Surgery Instructions         What to do before my surgery:    All patients scheduled for elective surgery must test for COVID19 72-96 hours prior to the surgery date.  Pre-Procedure COVID-19 Self-Test will be scheduled for you by your provider.  You can receive your Pre-Procedure COVID-19 Self-Test at:  [facility]. [address], [place], [address] [facility],Third [address]   If you do not have the COVID-19 test we will cancel or reschedule your procedure   Once you test you must quarantine at home until after your procedure with only your immediate family members or whoever lives with you.  If you must work during your quarantine period, we ask that you continue to practice social distancing, wear a mask that covers your mouth and nose and perform all hand hygiene as recommended by the CDC.  If you must go to the grocery, etc. and cannot get someone to do this for you please wear a mask that covers your mouth and nose and perform all hand hygiene as recommended by the CDC.  Your surgeon's office will notify you with any concerns about your test result. What can I expect on the day of surgery?  Arrive at the time the office or hospital staff tell you on the day of your procedure.  Wear a mask when entering the hospital.     A member of the hospital staff will take your temperature and ask you a few questions as you enter the building.  In abundance of caution for the safety of all our patients and staff, please follow all hospital visitor guidelines in place at the time of your procedure. The staff caring for you will stay in close communication with your loved one and keep them updated on progress.  Please provide a phone number for us to use when communicating with your family or ride home.    When you are ready to discharge, we will notify your

## 2021-08-25 NOTE — PROGRESS NOTES
Patient collected pre-op COVID-19 screening instruction and collection supplies given to patient accordingly. Patient denies fever/cough/sob or recent travels. Patient voiced understanding of collection/quarantine instructions. COVID screening lab ordered, collection completed without difficulty, identifiers placed on specimen. AVS given at discharge. Results will be given via mychart or telephone call [facility] will manage any positive test results, the procedure will be rescheduled at a later date).

## 2021-08-26 LAB
SARS-COV-2: NOT DETECTED
SOURCE: NORMAL

## 2021-08-30 ENCOUNTER — ANESTHESIA EVENT (OUTPATIENT)
Dept: OPERATING ROOM | Age: 31
End: 2021-08-30
Payer: COMMERCIAL

## 2021-08-30 NOTE — ANESTHESIA PRE PROCEDURE
Department of Anesthesiology  Preprocedure Note       Name:  Chang Ferrell   Age:  31 y.o.  :  1990                                          MRN:  [id number]         Date:  2021      Surgeon: Trixie Frank):  Franklin Marie MD    Procedure: Procedure(s):  LEFT ARM FOREIGN BODY REMOVAL    Medications prior to admission:   Prior to Admission medications    Medication Sig Start Date End Date Taking? Authorizing Provider   levothyroxine (SYNTHROID) 100 MCG tablet Take 1 tablet by mouth daily 3/9/20  Yes Historical Provider, MD   ferrous sulfate 325 (65 FE) MG tablet Take 325 mg by mouth daily (with breakfast)   Yes Historical Provider, MD       Current medications:    No current facility-administered medications for this encounter. Current Outpatient Medications   Medication Sig Dispense Refill    levothyroxine (SYNTHROID) 100 MCG tablet Take 1 tablet by mouth daily      ferrous sulfate 325 (65 FE) MG tablet Take 325 mg by mouth daily (with breakfast)         Allergies:     Allergies   Allergen Reactions    Pcn [Penicillins] Hives    Morphine Hives    Gabapentin Rash       Problem List:    Patient Active Problem List   Diagnosis Code    Cyst of finger [id number]    Encounter for supervision of normal pregnancy in multigravida Z34.80    Previous  delivery affecting pregnancy P30.611    Periumbilical abdominal pain R10.33    Melena K92.1    Rectal bleeding K62.5    Iron deficiency anemia D50.9    Morbid obesity with BMI of 50.0-59.9, adult ([facility] E66.01, Z68.43    Chronic GERD K21.9    Fatty liver K76.0    Chronic bilateral low back pain without sciatica M54.5, G89.29    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  Graves disease Dx: 2019    Dr. Hesham Edwards disease     History of blood transfusion     Hypermenorrhea     Irregular menses     Liver disease     Menorrhagia     Morbid obesity ([address]     Nexplanon removal     Thyroid disorder     UTI (lower urinary tract infection)     chronic UTI's with this pregnancy - last     UTI (urinary tract infection)        Past Surgical History:        Procedure Laterality Date     SECTION      ,     CHOLECYSTECTOMY  2010    FOOT SURGERY Left 2017    lipoma removed   [address]      LAPAROSCOPY  13    diagnostic    THYROIDECTOMY Bilateral 3/3/2020    TOTAL THYROIDECTOMY [facility] by Shruthi Yung [facility] at [facility]  2012    Gastritis -Dr. Joi Herbert       Social History:    Social History     Tobacco Use    Smoking status: Former Smoker     Packs/day: 0.10     Years: 1.00     Pack years: 0.10     Types: Cigarettes     Start date:      Quit date: 2013     Years since quittin.7    Smokeless tobacco: Never Used   Substance Use Topics    Alcohol use: No                                Counseling given: Not Answered      Vital Signs (Current):   Vitals:    21 1632   Weight: (!) 313 lb (142 kg)   Height: 5' 4\" (1.626 m)                                              BP Readings from Last 3 Encounters:   21 110/70   21 134/74   21 133/80       NPO Status:                                                                                 BMI:   Wt Readings from Last 3 Encounters:   21 (!) 313 lb 14.4 oz (142.4 kg)   21 (!) 312 lb (141.5 kg)   20 268 lb 12.8 oz (121.9 kg)     Body mass index is 53.73 kg/m².     CBC:   Lab Results   Component Value Date    WBC 13.8 2020    RBC 4.55 2020    HGB 9.6 2020    HCT 33.4 2020    MCV 73.4 03/04/2020    RDW 15.9 03/04/2020     03/04/2020       CMP:   Lab Results   Component Value Date     03/04/2020    K 4.4 03/04/2020     03/04/2020    CO2 25 03/04/2020    BUN 8 03/04/2020    CREATININE 0.4 03/04/2020    GFRAA >60 03/04/2020    LABGLOM >60 03/04/2020    GLUCOSE 142 03/04/2020    PROT 6.7 03/04/2020    PROT 8.2 07/11/2012    CALCIUM 8.7 03/04/2020    CALCIUM 8.7 03/04/2020    BILITOT 0.3 03/04/2020    ALKPHOS 119 03/04/2020    AST 13 03/04/2020    ALT 14 03/04/2020       POC Tests: No results for input(s): POCGLU, POCNA, POCK, POCCL, POCBUN, POCHEMO, POCHCT in the last 72 hours. Coags:   Lab Results   Component Value Date    PROTIME 11.7 03/25/2014    INR 1.08 03/25/2014    APTT 30.1 03/25/2014       HCG (If Applicable):   Lab Results   Component Value Date    PREGTESTUR NEGATIVE 03/03/2020    PREGTESTUR (NOTE)  PERFORMED BY POINT OF CARE METHOD.   03/03/2020        ABGs: No results found for: PHART, PO2ART, WVQ3IWR, DDL1HSV, BEART, Z3AUJUJC     Type & Screen (If Applicable):  No results found for: LABABO, LABRH    Drug/Infectious Status (If Applicable):  No results found for: HIV, HEPCAB    COVID-19 Screening (If Applicable):   Lab Results   Component Value Date    COVID19 NOT DETECTED 08/25/2021           Anesthesia Evaluation  Patient summary reviewed  Airway: Mallampati: III        Dental:    (+) upper dentures      Pulmonary: breath sounds clear to auscultation                             Cardiovascular:            Rhythm: regular                      Neuro/Psych:   (+) depression/anxiety             GI/Hepatic/Renal:   (+) GERD:, morbid obesity         ROS comment: Fatty liver. Endo/Other:    (+) hypothyroidism, blood dyscrasia: anemia:., .                  ROS comment: S/p THYROIDECTOMY Abdominal:   (+) obese,           Vascular: negative vascular ROS. Other Findings:           Anesthesia Plan      MAC and general     ASA 3       Induction: intravenous.     MIPS: Postoperative opioids intended. Anesthetic plan and risks discussed with patient. Plan discussed with CRNA. Attending anesthesiologist reviewed and agrees with Preprocedure content            DADA Chen - CRNA   8/30/2021       Pre Anesthesia Assessment complete.  Chart reviewed on 8/30/2021

## 2021-08-31 ENCOUNTER — APPOINTMENT (OUTPATIENT)
Dept: GENERAL RADIOLOGY | Age: 31
End: 2021-08-31
Attending: SURGERY
Payer: COMMERCIAL

## 2021-08-31 ENCOUNTER — ANESTHESIA (OUTPATIENT)
Dept: OPERATING ROOM | Age: 31
End: 2021-08-31
Payer: COMMERCIAL

## 2021-08-31 ENCOUNTER — HOSPITAL ENCOUNTER (OUTPATIENT)
Age: 31
Setting detail: OUTPATIENT SURGERY
Discharge: HOME | End: 2021-08-31
Attending: SURGERY | Admitting: SURGERY
Payer: COMMERCIAL

## 2021-08-31 VITALS
SYSTOLIC BLOOD PRESSURE: 99 MMHG | HEIGHT: 64 IN | TEMPERATURE: 96.6 F | HEART RATE: 79 BPM | RESPIRATION RATE: 18 BRPM | OXYGEN SATURATION: 99 % | DIASTOLIC BLOOD PRESSURE: 55 MMHG | WEIGHT: 293 LBS | BODY MASS INDEX: 50.02 KG/M2

## 2021-08-31 VITALS
DIASTOLIC BLOOD PRESSURE: 62 MMHG | RESPIRATION RATE: 20 BRPM | OXYGEN SATURATION: 92 % | SYSTOLIC BLOOD PRESSURE: 124 MMHG

## 2021-08-31 DIAGNOSIS — M79.5 FOREIGN BODY IN SOFT TISSUE: ICD-10-CM

## 2021-08-31 LAB — PREGNANCY TEST URINE, POC: NEGATIVE

## 2021-08-31 PROCEDURE — 2500000003 HC RX 250 WO HCPCS: Performed by: SURGERY

## 2021-08-31 PROCEDURE — 2720000010 HC SURG SUPPLY STERILE: Performed by: SURGERY

## 2021-08-31 PROCEDURE — 7100000010 HC PHASE II RECOVERY - FIRST 15 MIN: Performed by: SURGERY

## 2021-08-31 PROCEDURE — APPNB30 APP NON BILLABLE TIME 0-30 MINS: Performed by: PHYSICIAN ASSISTANT

## 2021-08-31 PROCEDURE — 81025 URINE PREGNANCY TEST: CPT

## 2021-08-31 PROCEDURE — 2709999900 HC NON-CHARGEABLE SUPPLY: Performed by: SURGERY

## 2021-08-31 PROCEDURE — 76000 FLUOROSCOPY <1 HR PHYS/QHP: CPT

## 2021-08-31 PROCEDURE — 3700000001 HC ADD 15 MINUTES (ANESTHESIA): Performed by: SURGERY

## 2021-08-31 PROCEDURE — 2580000003 HC RX 258: Performed by: NURSE ANESTHETIST, CERTIFIED REGISTERED

## 2021-08-31 PROCEDURE — 12042 INTMD RPR N-HF/GENIT2.6-7.5: CPT | Performed by: SURGERY

## 2021-08-31 PROCEDURE — 7100000011 HC PHASE II RECOVERY - ADDTL 15 MIN: Performed by: SURGERY

## 2021-08-31 PROCEDURE — 2500000003 HC RX 250 WO HCPCS: Performed by: NURSE ANESTHETIST, CERTIFIED REGISTERED

## 2021-08-31 PROCEDURE — 3600000012 HC SURGERY LEVEL 2 ADDTL 15MIN: Performed by: SURGERY

## 2021-08-31 PROCEDURE — 6360000002 HC RX W HCPCS: Performed by: PHYSICIAN ASSISTANT

## 2021-08-31 PROCEDURE — 88300 SURGICAL PATH GROSS: CPT | Performed by: PATHOLOGY

## 2021-08-31 PROCEDURE — 10121 INC&RMVL FB SUBQ TISS COMP: CPT | Performed by: SURGERY

## 2021-08-31 PROCEDURE — 99999 PR OFFICE/OUTPT VISIT,PROCEDURE ONLY: CPT | Performed by: PHYSICIAN ASSISTANT

## 2021-08-31 PROCEDURE — 3600000002 HC SURGERY LEVEL 2 BASE: Performed by: SURGERY

## 2021-08-31 PROCEDURE — 6370000000 HC RX 637 (ALT 250 FOR IP): Performed by: ANESTHESIOLOGY

## 2021-08-31 PROCEDURE — 3700000000 HC ANESTHESIA ATTENDED CARE: Performed by: SURGERY

## 2021-08-31 PROCEDURE — 6360000002 HC RX W HCPCS: Performed by: NURSE ANESTHETIST, CERTIFIED REGISTERED

## 2021-08-31 RX ORDER — PROPOFOL 10 MG/ML
INJECTION, EMULSION INTRAVENOUS CONTINUOUS PRN
Status: DISCONTINUED | OUTPATIENT
Start: 2021-08-31 | End: 2021-08-31 | Stop reason: SDUPTHER

## 2021-08-31 RX ORDER — LEVOFLOXACIN 5 MG/ML
500 INJECTION, SOLUTION INTRAVENOUS ONCE
Status: COMPLETED | OUTPATIENT
Start: 2021-08-31 | End: 2021-08-31

## 2021-08-31 RX ORDER — HYDROCODONE BITARTRATE AND ACETAMINOPHEN 5; 325 MG/1; MG/1
1 TABLET ORAL ONCE
Status: COMPLETED | OUTPATIENT
Start: 2021-08-31 | End: 2021-08-31

## 2021-08-31 RX ORDER — KETAMINE HCL 50MG/ML(1)
SYRINGE (ML) INTRAVENOUS PRN
Status: DISCONTINUED | OUTPATIENT
Start: 2021-08-31 | End: 2021-08-31 | Stop reason: SDUPTHER

## 2021-08-31 RX ORDER — SODIUM CHLORIDE, SODIUM LACTATE, POTASSIUM CHLORIDE, CALCIUM CHLORIDE 600; 310; 30; 20 MG/100ML; MG/100ML; MG/100ML; MG/100ML
INJECTION, SOLUTION INTRAVENOUS CONTINUOUS PRN
Status: DISCONTINUED | OUTPATIENT
Start: 2021-08-31 | End: 2021-08-31 | Stop reason: SDUPTHER

## 2021-08-31 RX ORDER — MIDAZOLAM HYDROCHLORIDE 1 MG/ML
INJECTION INTRAMUSCULAR; INTRAVENOUS PRN
Status: DISCONTINUED | OUTPATIENT
Start: 2021-08-31 | End: 2021-08-31 | Stop reason: SDUPTHER

## 2021-08-31 RX ORDER — LIDOCAINE HYDROCHLORIDE 10 MG/ML
INJECTION, SOLUTION EPIDURAL; INFILTRATION; INTRACAUDAL; PERINEURAL
Status: COMPLETED | OUTPATIENT
Start: 2021-08-31 | End: 2021-08-31

## 2021-08-31 RX ORDER — FENTANYL CITRATE 50 UG/ML
INJECTION, SOLUTION INTRAMUSCULAR; INTRAVENOUS PRN
Status: DISCONTINUED | OUTPATIENT
Start: 2021-08-31 | End: 2021-08-31 | Stop reason: SDUPTHER

## 2021-08-31 RX ORDER — LIDOCAINE HYDROCHLORIDE 20 MG/ML
INJECTION, SOLUTION INTRAVENOUS PRN
Status: DISCONTINUED | OUTPATIENT
Start: 2021-08-31 | End: 2021-08-31 | Stop reason: SDUPTHER

## 2021-08-31 RX ADMIN — SODIUM CHLORIDE, POTASSIUM CHLORIDE, SODIUM LACTATE AND CALCIUM CHLORIDE: 600; 310; 30; 20 INJECTION, SOLUTION INTRAVENOUS at 11:58

## 2021-08-31 RX ADMIN — LEVOFLOXACIN 500 MG: 5 INJECTION, SOLUTION INTRAVENOUS at 11:59

## 2021-08-31 RX ADMIN — HYDROCODONE BITARTRATE AND ACETAMINOPHEN 1 TABLET: 5; 325 TABLET ORAL at 13:25

## 2021-08-31 RX ADMIN — LIDOCAINE HYDROCHLORIDE 40 MG: 20 INJECTION, SOLUTION INTRAVENOUS at 12:12

## 2021-08-31 RX ADMIN — FENTANYL CITRATE 50 MCG: 50 INJECTION, SOLUTION INTRAMUSCULAR; INTRAVENOUS at 12:26

## 2021-08-31 RX ADMIN — PROPOFOL 100 MCG/KG/MIN: 10 INJECTION, EMULSION INTRAVENOUS at 12:12

## 2021-08-31 RX ADMIN — Medication 25 MG: at 12:12

## 2021-08-31 RX ADMIN — MIDAZOLAM 2 MG: 1 INJECTION INTRAMUSCULAR; INTRAVENOUS at 12:06

## 2021-08-31 RX ADMIN — Medication 25 MG: at 12:20

## 2021-08-31 RX ADMIN — FENTANYL CITRATE 50 MCG: 50 INJECTION, SOLUTION INTRAMUSCULAR; INTRAVENOUS at 12:24

## 2021-08-31 ASSESSMENT — PULMONARY FUNCTION TESTS
PIF_VALUE: 0
PIF_VALUE: 1
PIF_VALUE: 0
PIF_VALUE: 1
PIF_VALUE: 0
PIF_VALUE: 1
PIF_VALUE: 0
PIF_VALUE: 1
PIF_VALUE: 0
PIF_VALUE: 1
PIF_VALUE: 0

## 2021-08-31 ASSESSMENT — ENCOUNTER SYMPTOMS
EYE REDNESS: 0
EYE ITCHING: 0
CONSTIPATION: 0
APNEA: 0
BACK PAIN: 0
PHOTOPHOBIA: 0
COLOR CHANGE: 0
STRIDOR: 0
SORE THROAT: 0
CHOKING: 0
RECTAL PAIN: 0
ANAL BLEEDING: 0

## 2021-08-31 ASSESSMENT — PAIN - FUNCTIONAL ASSESSMENT: PAIN_FUNCTIONAL_ASSESSMENT: 0-10

## 2021-08-31 ASSESSMENT — PAIN SCALES - GENERAL
PAINLEVEL_OUTOF10: 0
PAINLEVEL_OUTOF10: 5

## 2021-08-31 NOTE — ANESTHESIA POSTPROCEDURE EVALUATION
Department of Anesthesiology  Postprocedure Note    Patient: Nargis Jj  MRN: [id number]  YOB: 1990  Date of evaluation: 8/31/2021  Time:  12:50 PM     Procedure Summary     Date: 08/31/21 Room / Location: [address]    Anesthesia Start: 9923 Anesthesia Stop:     Procedure: LEFT ARM FOREIGN BODY REMOVAL (Left Arm Upper) Diagnosis:       Foreign body in soft tissue      (Foreign body in soft tissue [M79.5])    Surgeons: Trevin Weiss MD Responsible Provider: DADA Murillo CRNA    Anesthesia Type: MAC, general ASA Status: 3          Anesthesia Type: No value filed. Jose Luis Phase I:      Jose Luis Phase II: Jose Luis Score: 9    Last vitals: Reviewed and per EMR flowsheets.        Anesthesia Post Evaluation    Patient location during evaluation: bedside  Level of consciousness: awake  Pain score: 0  Airway patency: patent  Nausea & Vomiting: no vomiting and no nausea  Complications: no  Cardiovascular status: hemodynamically stable  Respiratory status: nonlabored ventilation, spontaneous ventilation and room air  Hydration status: stable

## 2021-08-31 NOTE — H&P
Claude Delgado MD      General Surgery       Subjective:     Patient is a 31 y.o. female scheduled for L arm FB removal. Indications for procedure are retained Nexplanon implant that has migrated and has not been able to be retrieved in the office by OBGYN or Gen surg. Patient Active Problem List    Diagnosis Date Noted    Melena 2016    Rectal bleeding 2016    Iron deficiency anemia     Periumbilical abdominal pain 2016    Graves disease 2020    Neoplasm of uncertain behavior of skin of thigh 10/08/2018    Morbid obesity with BMI of 50.0-59.9, adult ([address] 2018    Chronic GERD 2018    Fatty liver 2018    Chronic bilateral low back pain without sciatica 2018    Encounter for supervision of normal pregnancy in multigravida 2014    Previous  delivery affecting pregnancy 2014    Cyst of finger 2013     Past Medical History:   Diagnosis Date    Abdominal pain     Abnormal uterine bleeding (AUB)     Acute pelvic pain     Anemia     Depression     Foot ulcer, left ([address] 2017    status post nerve decompression repair, excision of neoplasm, left foot, with muscle flap and graft skin application.     Graves disease Dx: 2019    Dr. Jacquelin Potter disease     History of blood transfusion     Hypermenorrhea     Irregular menses     Liver disease     Menorrhagia     Morbid obesity ([address]     Nexplanon removal     Thyroid disorder     UTI (lower urinary tract infection)     chronic UTI's with this pregnancy - last     UTI (urinary tract infection)       Past Surgical History:   Procedure Laterality Date     SECTION      ,     CHOLECYSTECTOMY  2010    FOOT SURGERY Left 2017    [address]oma removed   [address]      LAPAROSCOPY  13    diagnostic    THYROIDECTOMY Bilateral 3/3/2020    TOTAL THYROIDECTOMY performed by Idalia Delgado MD at [facility]    UPPER GASTROINTESTINAL ENDOSCOPY  2012    Gastritis -Dr. Faby Kramer      Prior to Admission medications    Medication Sig Start Date End Date Taking? Authorizing Provider   levothyroxine (SYNTHROID) 100 MCG tablet Take 1 tablet by mouth daily 3/9/20  Yes Historical Provider, MD   ferrous sulfate 325 (65 FE) MG tablet Take 325 mg by mouth daily (with breakfast)   Yes Historical Provider, MD     Allergies   Allergen Reactions    Pcn [Penicillins] Hives    Morphine Hives    Gabapentin Rash      Social History     Tobacco Use    Smoking status: Former Smoker     Packs/day: 0.10     Years: 1.00     Pack years: 0.10     Types: Cigarettes     Start date:      Quit date: 2013     Years since quittin.7    Smokeless tobacco: Never Used   Substance Use Topics    Alcohol use: No      Family History   Problem Relation Age of Onset    Thyroid Disease Sister     Thyroid Disease Paternal Aunt     Cancer Maternal Grandmother     Cancer Paternal Grandmother     Diabetes Mother     Heart Disease Mother     No Known Problems Father           Review of Systems  Review of Systems   Constitutional: Negative for chills and fever. HENT: Negative for ear pain, mouth sores, sore throat and tinnitus. Eyes: Negative for photophobia, redness and itching. Respiratory: Negative for apnea, choking and stridor. Cardiovascular: Negative for chest pain and palpitations. Gastrointestinal: Negative for anal bleeding, constipation and rectal pain. Endocrine: Negative for polydipsia. Genitourinary: Negative for enuresis, flank pain and hematuria. Musculoskeletal: Negative for back pain, joint swelling and myalgias. Skin: Negative for color change and pallor. Allergic/Immunologic: Negative for environmental allergies. Neurological: Negative for syncope and speech difficulty. Psychiatric/Behavioral: Negative for confusion and hallucinations. Objective:     No data found.   Physical Exam  Constitutional:       Appearance: She is well-developed. She is morbidly obese. HENT:      Head: Normocephalic. Eyes:      Pupils: Pupils are equal, round, and reactive to light. Cardiovascular:      Rate and Rhythm: Normal rate. Pulmonary:      Effort: Pulmonary effort is normal.   Abdominal:      General: There is no distension. Palpations: Abdomen is soft. There is no mass. Tenderness: There is no abdominal tenderness. There is no guarding or rebound. Musculoskeletal:         General: Normal range of motion. Cervical back: Normal range of motion and neck supple. Skin:     General: Skin is warm. Comments: L upper arm scar well healed   Neurological:      Mental Status: She is alert and oriented to person, place, and time. Data Review  CBC:   Lab Results   Component Value Date    WBC 13.8 03/04/2020    RBC 4.55 03/04/2020       Assessment:     Left arm foreign body    Plan: Will proceed with L arm foreign body removal under MAC. The patient was counseled at length about the risks of luis Covid-19 during their perioperative period and any recovery window from their procedure. The patient was made aware that luis Covid-19  may worsen their prognosis for recovering from their procedure  and lend to a higher morbidity and/or mortality risk. All material risks, benefits, and reasonable alternatives including postponing the procedure were discussed. The patient does wish to proceed with the procedure at this time.           Kylee Pickard PA-C

## 2021-08-31 NOTE — BRIEF OP NOTE
Brief Postoperative Note      Patient: Kylee Stephenson  YOB: 1990  MRN: [id number]    Date of Procedure: 8/31/2021    Pre-Op Diagnosis: Foreign body in soft tissue [M79.5]    Post-Op Diagnosis: Same       Procedure(s):  LEFT ARM FOREIGN BODY REMOVAL    Surgeon(s):  Fuad Cortez MD    Assistant:  First Assistant: Amee Shukla PA-C    Anesthesia: Monitor Anesthesia Care    Estimated Blood Loss (mL): Minimal    Complications: None    Specimens:   ID Type Source Tests Collected by Time Destination   A : Nexplanon implant Specimen Arm SURGICAL PATHOLOGY Fuad Cortez MD 8/31/2021 1230        Implants:  * No implants in log *      Drains: * No LDAs found *    Findings: Position of implant confirmed with XR prior to removal. Final XR verified no FB    Electronically signed by Amee Shukla PA-C on 8/31/2021 at 12:42 PM

## 2021-08-31 NOTE — PROGRESS NOTES
4936- pt returned to [facility] rm 15, family at bedside, respirations even and unlabored, monitor placed on pt, call light in reach, bed in lowest position, pt drowsy from medications, arousable on calling name   [phone]- pt refused crackers and beverage at this time   [phone]- discharge instructions reviewed w/ pt and pt family member at bedside, pt asked when to return to work, Dr. Saba Alcala called to bedside and work note written, copy in soft chart  1405- pt discharged via car w/ family member driving

## 2021-08-31 NOTE — OP NOTE
Operative Note      Patient: Charlene Stallings  YOB: 1990  MRN: [id number]    Date of Procedure: 8/31/2021    Pre-Op Diagnosis: Foreign body in soft tissue [M79.5] left arm    Post-Op Diagnosis: birth control implant       Procedure(s):  1. LEFT ARM FOREIGN BODY REMOVAL UNDER C-ARM  2. Two layer closure 2.6 cm    Surgeon(s):  Vazquez Ma MD    Assistant:   First Assistant: Rafal Estevez PA-C   The  Use of a first assistant was necessary for the proper positioning, prepping, and draping of the patient, as well as the safe and expeditious execution of the case and closure of skin and subcutaneous tissues. Anesthesia: Monitor Anesthesia Care    Estimated Blood Loss (mL): Minimal    Complications: None    Specimens:   ID Type Source Tests Collected by Time Destination   A : Nexplanon implant Specimen Arm SURGICAL PATHOLOGY Vazquez Ma MD 8/31/2021 1230        Implants:  * No implants in log *      Drains: * No LDAs found *    Findings: same    Detailed Description of Procedure: The patient was brought to the operating room and left arm was prepped and draped in the usual sterile fashion. 1% lidocaine and localized. Using C-arm the implant was located AP and lateral view. An incision was made and the implant was easily palpated in the soft tissue and removed. The remainder wound was closed in two layers using 3-0 and 4-0 vicryl. The wound measured about 2.6 cm. Dermabond was applied.     Electronically signed by Latrice Donahue MD on 8/31/2021 at 2:50 PM

## 2021-09-01 ENCOUNTER — TELEPHONE (OUTPATIENT)
Dept: SURGERY | Age: 31
End: 2021-09-01

## 2021-09-01 ENCOUNTER — TELEPHONE (OUTPATIENT)
Dept: BARIATRICS/WEIGHT MGMT | Age: 31
End: 2021-09-01

## 2021-09-01 NOTE — TELEPHONE ENCOUNTER
David Almeida took the call - in between  Rooming pt's  - pt asking for pain meds - babs sent a perfect serve to [facility] - waiting on a response.

## 2021-09-01 NOTE — TELEPHONE ENCOUNTER
Pt called asking for pain medication to go to [facility] on [facility].  S/P LEFT ARM FOREIGN BODY REMOVAL UNDER C-ARM 8/31/21

## 2021-09-08 NOTE — PROGRESS NOTES
Chief Complaint   Patient presents with    Consultation     Nexplanon Removal       SUBJECTIVE:  HPI: Patient presents today for left arm birth control implant removal.  Patient was referred to me by Dr. Claudetta Kindred attempted in the office couple times to remove this implant without success. Patient denies any pain. She is morbidly obese with BMI of 53  I have reviewed the patient's(pertinent information tothis visit) medical history, family history(scanned in  the Media tab under \"patient questioner\"), social history and review of systems with the patient today in the office. Past Surgical History:   Procedure Laterality Date    ARM SURGERY Left 8/31/2021    LEFT ARM FOREIGN BODY REMOVAL performed by Elisabeth Ernandez MD at [address]      2010, 2014    CHOLECYSTECTOMY  2010    FOOT SURGERY Left 2017    lipoma removed   [address]  2013    LAPAROSCOPY  12/21/13    diagnostic    THYROIDECTOMY Bilateral 3/3/2020    TOTAL THYROIDECTOMY performed by Elisabeth Ernandez MD at [address]  07/24/2012    Gastritis -Dr. Isaiah Monroe     Past Medical History:   Diagnosis Date    Abdominal pain     Abnormal uterine bleeding (AUB)     Acute pelvic pain     Anemia 2010    Depression     Foot ulcer, left ([address] 2017    status post nerve decompression repair, excision of neoplasm, left foot, with muscle flap and graft skin application.     Graves disease Dx: 2/2019    Dr. Cinthya Richard disease     History of blood transfusion     Hypermenorrhea     Irregular menses     Liver disease     Menorrhagia     Morbid obesity ([address]     Nexplanon removal     Thyroid disorder     UTI (lower urinary tract infection)     chronic UTI's with this pregnancy - last 2014    UTI (urinary tract infection)      Family History   Problem Relation Age of Onset    Thyroid Disease Sister     Thyroid Disease Paternal Aunt     Cancer Maternal Grandmother     Cancer Paternal Grandmother     Diabetes Mother     Heart Disease Mother     No Known Problems Father      Social History     Socioeconomic History    Marital status: Single     Spouse name: Not on file    Number of children: Not on file    Years of education: Not on file    Highest education level: Not on file   Occupational History    Not on file   Tobacco Use    Smoking status: Former Smoker     Packs/day: 0.10     Years: 1.00     Pack years: 0.10     Types: Cigarettes     Start date:      Quit date: 2013     Years since quittin.7    Smokeless tobacco: Never Used   Vaping Use    Vaping Use: Never used   Substance and Sexual Activity    Alcohol use: No    Drug use: No     Types: Marijuana     Comment: quit in     Sexual activity: Yes     Partners: Male   Other Topics Concern    Not on file   Social History Narrative    Not on file     Social Determinants of Health     Financial Resource Strain: Low Risk     Difficulty of Paying Living Expenses: Not hard at all   Food Insecurity: No Food Insecurity    Worried About [address] [employer] in the Last Year: Never true    [address] [employer] in the Last Year: Never true   Transportation Needs:     Lack of Transportation (Medical):      Lack of Transportation (Non-Medical):    Physical Activity:     Days of Exercise per Week:     Minutes of Exercise per Session:    Stress:     Feeling of Stress :    Social Connections:     Frequency of Communication with Friends and Family:     Frequency of Social Gatherings with Friends and Family:     Attends Christianity Services:     Active Member of Clubs or Organizations:     Attends Club or Organization Meetings:     Marital Status:    Intimate Partner Violence:     Fear of Current or Ex-Partner:     Emotionally Abused:     Physically Abused:     Sexually Abused:        Current Outpatient Medications   Medication Sig Dispense Refill    levothyroxine (SYNTHROID) 100 MCG tablet Take 1 tablet by mouth daily      ferrous sulfate 325 (65 FE) MG tablet Take 325 mg by mouth daily (with breakfast)       No current facility-administered medications for this visit. Allergies   Allergen Reactions    Pcn [Penicillins] Hives    Morphine Hives    Gabapentin Rash       Review of Systems:         Review of Systems      OBJECTIVE:  Physical Exam:    /74   Pulse 82   Resp 16   Ht 5' 6\" (1.676 m)   Wt (!) 313 lb 14.4 oz (142.4 kg)   LMP 08/10/2021   SpO2 98%   BMI 50.66 kg/m²      Physical Exam      ASSESSMENT:  1. Foreign body (FB) in soft tissue          PLAN:  Treatment:  Patient counseled on risks, benefits, and alternatives of treatment plan at length today. Patient states an understanding and willingness to proceed with plan. Office Procedure note:      Pre-op Diagnosis:    1. Foreign body (FB) in soft tissue    Birth control implant in the left arm    Post-op Diagnosis:  Same. Procedure:  Ultrasound-guided attempted removal  1. Foreign body (FB) in soft tissue        Surgeon:   Vanesa eL MD    Anesthesia:   Local with 1% Lidocaine local infiltration,without epinephrine. Complications:  None. Drains: None    Indications:   See progress note. .     Procedure: The patient is placed with the above described area exposed. This was  prepped, draped and anesthestized in the usual sterile manner. An incision was created over the presumed insertion of the implant. The incision was deepened to the subtenons tissue bluntly with a hemostat. I tried to feel for this implant but not successful. Ultrasound was brought in and attempted to identify this implant using ultrasound it was difficult. After about 45 minutes of exploring I decided to abort the procedure and schedule patient for C arm guided removal of this implant in the OR. The wound was irrigated and closed using interrupted 4-0 nylon sutures. No orders of the defined types were placed in this encounter. No orders of the defined types were placed in this encounter. Follow Up:  No follow-ups on file. to remove sutures andreview path report.        Tavia Desai MD

## 2021-09-13 ENCOUNTER — OFFICE VISIT (OUTPATIENT)
Dept: SURGERY | Age: 31
End: 2021-09-13

## 2021-09-13 VITALS
BODY MASS INDEX: 53.73 KG/M2 | WEIGHT: 293 LBS | SYSTOLIC BLOOD PRESSURE: 120 MMHG | HEART RATE: 64 BPM | DIASTOLIC BLOOD PRESSURE: 60 MMHG | OXYGEN SATURATION: 96 %

## 2021-09-13 DIAGNOSIS — Z09 POSTOPERATIVE EXAMINATION: Primary | ICD-10-CM

## 2021-09-13 PROCEDURE — 99024 POSTOP FOLLOW-UP VISIT: CPT | Performed by: SURGERY

## 2021-10-04 ENCOUNTER — OFFICE VISIT (OUTPATIENT)
Dept: SURGERY | Age: 31
End: 2021-10-04

## 2021-10-04 VITALS
DIASTOLIC BLOOD PRESSURE: 82 MMHG | HEIGHT: 64 IN | SYSTOLIC BLOOD PRESSURE: 135 MMHG | HEART RATE: 90 BPM | WEIGHT: 293 LBS | BODY MASS INDEX: 50.02 KG/M2

## 2021-10-04 DIAGNOSIS — M79.5 FOREIGN BODY (FB) IN SOFT TISSUE: Primary | ICD-10-CM

## 2021-10-04 PROCEDURE — 99024 POSTOP FOLLOW-UP VISIT: CPT | Performed by: SURGERY

## 2021-10-04 NOTE — PROGRESS NOTES
Chief Complaint   Patient presents with    Post-Op Check     2nd PO foreign body removal w/ fluroscopy 8/31/21          SUBJECTIVE:  Patient here for post op visit. Stitch protruding  Pain is minimal.  Wounds: minbruising and no discharge. Past Surgical History:   Procedure Laterality Date    ARM SURGERY Left 8/31/2021    LEFT ARM FOREIGN BODY REMOVAL performed by Rad Martin MD at [address]      2010, 2014    CHOLECYSTECTOMY  2010    FOOT SURGERY Left 2017    lipoma removed   [address]  2013    LAPAROSCOPY  12/21/13    diagnostic    THYROIDECTOMY Bilateral 3/3/2020    TOTAL THYROIDECTOMY performed by Rad Martin MD at [address]  07/24/2012    Gastritis -Dr. Major Opitz     Past Medical History:   Diagnosis Date    Abdominal pain     Abnormal uterine bleeding (AUB)     Acute pelvic pain     Anemia 2010    Depression     Foot ulcer, left ([address] 2017    status post nerve decompression repair, excision of neoplasm, left foot, with muscle flap and graft skin application.     Graves disease Dx: 2/2019    Dr. Esteban Briceño disease     History of blood transfusion     Hypermenorrhea     Irregular menses     Liver disease     Menorrhagia     Morbid obesity ([address]     Nexplanon removal     Thyroid disorder     UTI (lower urinary tract infection)     chronic UTI's with this pregnancy - last 2014    UTI (urinary tract infection)      Family History   Problem Relation Age of Onset    Thyroid Disease Sister     Thyroid Disease Paternal Aunt     Cancer Maternal Grandmother     Cancer Paternal Grandmother     Diabetes Mother     Heart Disease Mother     No Known Problems Father      Social History     Socioeconomic History    Marital status: Single     Spouse name: Not on file    Number of children: Not on file    Years of education: Not on file    Highest education level: Not on file   Occupational History    Not on file   Tobacco Use    Smoking status: Former Smoker     Packs/day: 0.10     Years: 1.00     Pack years: 0.10     Types: Cigarettes     Start date:      Quit date: 2013     Years since quittin.8    Smokeless tobacco: Never Used   Vaping Use    Vaping Use: Never used   Substance and Sexual Activity    Alcohol use: No    Drug use: No     Types: Marijuana     Comment: quit in     Sexual activity: Yes     Partners: Male   Other Topics Concern    Not on file   Social History Narrative    Not on file     Social Determinants of Health     Financial Resource Strain: Low Risk     Difficulty of Paying Living Expenses: Not hard at all   Food Insecurity: No Food Insecurity    Worried About [address] in the Last Year: Never true    [address] [employer] in the Last Year: Never true   Transportation Needs:     Lack of Transportation (Medical):  Lack of Transportation (Non-Medical):    Physical Activity:     Days of Exercise per Week:     Minutes of Exercise per Session:    Stress:     Feeling of Stress :    Social Connections:     Frequency of Communication with Friends and Family:     Frequency of Social Gatherings with Friends and Family:     Attends [employer] Services:     Active Member of Clubs or Organizations:     Attends Club or Organization Meetings:     Marital Status:    Intimate Partner Violence:     Fear of Current or Ex-Partner:     Emotionally Abused:     Physically Abused:     Sexually Abused:        OBJECTIVE:   Physical Exam    Wound well healed without signs of active infection. Suture line intact. Abdomen soft, nontender, nondistended. ASSESSMENT:  Patient doing well on this post operative check. Wounds well healed. 1. Foreign body (FB) in soft tissue        PLAN:  Suture removed  Continue same  Increase activity as tolerated        No orders of the defined types were placed in this encounter.        No orders of the defined types were placed in this encounter. Follow Up: No follow-ups on file.     Shiloh Escalera MD

## 2021-10-12 NOTE — PROGRESS NOTES
Chief Complaint   Patient presents with    Follow Up After Procedure     8/31/21 - removal birth control in left arm         SUBJECTIVE:  Patient here for post op visit. Pain is minimal.  Wounds: minbruising and no discharge. Past Surgical History:   Procedure Laterality Date    ARM SURGERY Left 8/31/2021    LEFT ARM FOREIGN BODY REMOVAL performed by Claudie Councilman, MD at [address]      2010, 2014    CHOLECYSTECTOMY  2010    FOOT SURGERY Left 2017    lipoma removed   [address]  2013    LAPAROSCOPY  12/21/13    diagnostic    THYROIDECTOMY Bilateral 3/3/2020    TOTAL THYROIDECTOMY performed by Claudie Councilman, MD at [address]  07/24/2012    Gastritis -Dr. Carolyn Vega     Past Medical History:   Diagnosis Date    Abdominal pain     Abnormal uterine bleeding (AUB)     Acute pelvic pain     Anemia 2010    Depression     Foot ulcer, left ([address] 2017    status post nerve decompression repair, excision of neoplasm, left foot, with muscle flap and graft skin application.     Graves disease Dx: 2/2019    Dr. Blake Risk disease     History of blood transfusion     Hypermenorrhea     Irregular menses     Liver disease     Menorrhagia     Morbid obesity ([address]     Nexplanon removal     Thyroid disorder     UTI (lower urinary tract infection)     chronic UTI's with this pregnancy - last 2014    UTI (urinary tract infection)      Family History   Problem Relation Age of Onset    Thyroid Disease Sister     Thyroid Disease Paternal Aunt     Cancer Maternal Grandmother     Cancer Paternal Grandmother     Diabetes Mother     Heart Disease Mother     No Known Problems Father      Social History     Socioeconomic History    Marital status: Single     Spouse name: Not on file    Number of children: Not on file    Years of education: Not on file    Highest education level: Not on file   Occupational History    Not on file Tobacco Use    Smoking status: Former Smoker     Packs/day: 0.10     Years: 1.00     Pack years: 0.10     Types: Cigarettes     Start date:      Quit date: 2013     Years since quittin.8    Smokeless tobacco: Never Used   Vaping Use    Vaping Use: Never used   Substance and Sexual Activity    Alcohol use: No    Drug use: No     Types: Marijuana     Comment: quit in     Sexual activity: Yes     Partners: Male   Other Topics Concern    Not on file   Social History Narrative    Not on file     Social Determinants of Health     Financial Resource Strain: Low Risk     Difficulty of Paying Living Expenses: Not hard at all   Food Insecurity: No Food Insecurity    Worried About [address] in the Last Year: Never true    [address] [employer] in the Last Year: Never true   Transportation Needs:     Lack of Transportation (Medical):  Lack of Transportation (Non-Medical):    Physical Activity:     Days of Exercise per Week:     Minutes of Exercise per Session:    Stress:     Feeling of Stress :    Social Connections:     Frequency of Communication with Friends and Family:     Frequency of Social Gatherings with Friends and Family:     Attends [employer] Services:     Active Member of Clubs or Organizations:     Attends Club or Organization Meetings:     Marital Status:    Intimate Partner Violence:     Fear of Current or Ex-Partner:     Emotionally Abused:     Physically Abused:     Sexually Abused:        OBJECTIVE:   Physical Exam    Wound well healed without signs of active infection. Suture line intact. Abdomen soft, nontender, nondistended. ASSESSMENT:  Patient doing well on this post operative check. Wounds well healed. 1. Postoperative examination        PLAN:  Continue same  Increase activity as tolerated        No orders of the defined types were placed in this encounter. No orders of the defined types were placed in this encounter.        Follow Up: No follow-ups on file.     Shiloh Escalera MD

## 2022-01-10 ENCOUNTER — OFFICE VISIT (OUTPATIENT)
Dept: OBGYN | Age: 32
End: 2022-01-10
Payer: COMMERCIAL

## 2022-01-10 VITALS
BODY MASS INDEX: 50.02 KG/M2 | SYSTOLIC BLOOD PRESSURE: 156 MMHG | DIASTOLIC BLOOD PRESSURE: 81 MMHG | WEIGHT: 293 LBS | HEART RATE: 76 BPM | HEIGHT: 64 IN

## 2022-01-10 DIAGNOSIS — Z01.419 ENCOUNTER FOR ANNUAL ROUTINE GYNECOLOGICAL EXAMINATION: Primary | ICD-10-CM

## 2022-01-10 DIAGNOSIS — N92.6 IRREGULAR MENSES: ICD-10-CM

## 2022-01-10 PROCEDURE — 99395 PREV VISIT EST AGE 18-39: CPT | Performed by: OBSTETRICS & GYNECOLOGY

## 2022-01-10 PROCEDURE — G8484 FLU IMMUNIZE NO ADMIN: HCPCS | Performed by: OBSTETRICS & GYNECOLOGY

## 2022-01-10 RX ORDER — PNV NO.95/FERROUS FUM/FOLIC AC 28MG-0.8MG
1 TABLET ORAL DAILY
Qty: 30 TABLET | Refills: 11 | Status: SHIPPED | OUTPATIENT
Start: 2022-01-10 | End: 2022-04-13

## 2022-01-10 ASSESSMENT — ENCOUNTER SYMPTOMS
RESPIRATORY NEGATIVE: 1
ALLERGIC/IMMUNOLOGIC NEGATIVE: 1
EYES NEGATIVE: 1
GASTROINTESTINAL NEGATIVE: 1

## 2022-01-10 NOTE — PROGRESS NOTES
1/10/22    Juani Duvall  1990    Chief Complaint   Patient presents with   [facility] Gynecologic Exam     Annual exam, sexually active, LMP 12/22/21, pap 2021 negative. pt desires to conceive .  Infertility     trying to conceive x4 months . ns        The patient is a 27 y.o. female, Marcus Montoya who presents for her annual exam.  She is menstruating normally. She is  sexually active. She is not currently taking birth control    She reports additional symptoms of slightly irregular menses. Pap smear history: Her last PAP smear was in 2021. Her results were normal.    Past Medical History:   Diagnosis Date    Abdominal pain     Abnormal uterine bleeding (AUB)     Acute pelvic pain     Anemia 2010    Depression     Foot ulcer, left ([address] 2017    status post nerve decompression repair, excision of neoplasm, left foot, with muscle flap and graft skin application.     Graves disease Dx: 2/2019    Dr. Yuliya Vanessa disease     History of blood transfusion     Hypermenorrhea     Irregular menses     Liver disease     Menorrhagia     Morbid obesity ([address]     Nexplanon removal     Thyroid disorder     UTI (lower urinary tract infection)     chronic UTI's with this pregnancy - last 2014    UTI (urinary tract infection)        Past Surgical History:   Procedure Laterality Date    ARM SURGERY Left 8/31/2021    LEFT ARM FOREIGN BODY REMOVAL performed by Kane Valentine MD at [address] [place] [address]      2010, 2014    CHOLECYSTECTOMY  2010    FOOT SURGERY Left 2017    lipoma removed   [facility]  2013    LAPAROSCOPY  12/21/13    diagnostic    THYROIDECTOMY Bilateral 3/3/2020    TOTAL THYROIDECTOMY performed by Kane Valentine MD at [facility]  07/24/2012    Gastritis -Dr. Reymundo Wu       Family History   Problem Relation Age of Onset    Thyroid Disease Sister     Thyroid Disease Paternal Aunt     Cancer Maternal Grandmother     Cancer Paternal Grandmother     Diabetes Mother     Heart Disease Mother     No Known Problems Father        Social History     Tobacco Use    Smoking status: Former Smoker     Packs/day: 0.10     Years: 1.00     Pack years: 0.10     Types: Cigarettes     Start date:      Quit date: 2013     Years since quittin.0    Smokeless tobacco: Never Used   Vaping Use    Vaping Use: Never used   Substance Use Topics    Alcohol use: No    Drug use: No     Types: Marijuana (Weed)     Comment: quit in        Current Outpatient Medications   Medication Sig Dispense Refill    Prenatal Vit-Fe Fumarate-FA (PRENATAL VITAMINS) 28-0.8 MG TABS Take 1 tablet by mouth daily (any prenatal vitamin covered) 30 tablet 11    levothyroxine (SYNTHROID) 175 MCG tablet Take 1 tablet by mouth daily       ferrous sulfate 325 (65 FE) MG tablet Take 325 mg by mouth daily (with breakfast) (Patient not taking: Reported on 1/10/2022)       No current facility-administered medications for this visit. Allergies   Allergen Reactions    Pcn [Penicillins] Hives    Morphine Hives    Gabapentin Rash           Immunization History   Administered Date(s) Administered    COVID-19, Pfizer, PF, 30mcg/0.3mL 2021, 2021    Rho (D) Immune Globulin 2014    Tdap (Boostrix, Adacel) 2014       Review of Systems   Constitutional: Negative. HENT: Negative. Eyes: Negative. Respiratory: Negative. Cardiovascular: Negative. Gastrointestinal: Negative. Endocrine: Negative. Genitourinary: Negative. Musculoskeletal: Negative. Skin: Negative. Allergic/Immunologic: Negative. Neurological: Negative. Hematological: Negative. Psychiatric/Behavioral: Negative. BP (!) 156/81   Pulse 76   Ht 5' 4\" (1.626 m)   Wt (!) 317 lb (143.8 kg)   LMP 2021   BMI 54.41 kg/m²     Physical Exam  Exam conducted with a chaperone present.    Constitutional:       Appearance: Normal appearance. HENT:      Head: Normocephalic and atraumatic. Nose: Nose normal.   Eyes:      Conjunctiva/sclera: Conjunctivae normal.   Cardiovascular:      Rate and Rhythm: Normal rate. Pulses: Normal pulses. Pulmonary:      Effort: Pulmonary effort is normal.   Chest:   Breasts:      Right: Normal. No axillary adenopathy or supraclavicular adenopathy. Left: Normal. No axillary adenopathy or supraclavicular adenopathy. Abdominal:      General: Abdomen is flat. Bowel sounds are normal.      Palpations: Abdomen is soft. Hernia: There is no hernia in the left inguinal area or right inguinal area. Genitourinary:     General: Normal vulva. Exam position: Lithotomy position. Labia:         Right: No rash, tenderness or lesion. Left: No rash, tenderness or lesion. Urethra: No prolapse. Vagina: Normal. No foreign body. No vaginal discharge, erythema, tenderness, bleeding, lesions or prolapsed vaginal walls. Cervix: No cervical motion tenderness, discharge, friability, lesion, erythema or cervical bleeding. Uterus: Normal. Not enlarged, not tender and no uterine prolapse. Adnexa: Right adnexa normal and left adnexa normal.        Right: No mass, tenderness or fullness. Left: No mass, tenderness or fullness. Musculoskeletal:      Cervical back: Normal range of motion and neck supple. Lymphadenopathy:      Upper Body:      Right upper body: No supraclavicular, axillary or pectoral adenopathy. Left upper body: No supraclavicular, axillary or pectoral adenopathy. Skin:     General: Skin is warm. Coloration: Skin is not ashen or cyanotic. Findings: No abrasion, abscess or bruising. Rash is not crusting or macular. Neurological:      Mental Status: She is alert and oriented to person, place, and time. No results found for this visit on 01/10/22. Assessment and Plan   Diagnosis Orders   1.  Encounter for annual routine gynecological examination     2. Irregular menses  Progesterone    Prolactin    TSH WITH REFLEX TO FT4    Testosterone, free, total    Follicle Stimulating Hormone    HCG Qualitative, Serum    US NON OB TRANSVAGINAL     Follow up one month  Return in about 1 year (around 1/10/2023).     Kevin Benton MD

## 2022-02-02 ENCOUNTER — NURSE ONLY (OUTPATIENT)
Dept: OBGYN | Age: 32
End: 2022-02-02
Payer: COMMERCIAL

## 2022-02-02 DIAGNOSIS — N92.6 IRREGULAR MENSES: ICD-10-CM

## 2022-02-02 PROCEDURE — 36415 COLL VENOUS BLD VENIPUNCTURE: CPT | Performed by: OBSTETRICS & GYNECOLOGY

## 2022-02-03 LAB
FOLLICLE STIMULATING HORMONE: 4.3 MIU/ML
HCG QUALITATIVE: NEGATIVE
PROGESTERONE LEVEL: <0.05 NG/ML
PROLACTIN: 18.6 NG/ML
T4 FREE: 1 NG/DL (ref 0.9–1.8)
TSH REFLEX FT4: 11.1 UIU/ML (ref 0.27–4.2)

## 2022-02-10 LAB
SEX HORMONE BINDING GLOBULIN: 20 NMOL/L (ref 30–135)
TESTOSTERONE FREE-NONMALE: 3.9 PG/ML (ref 1.3–9.2)
TESTOSTERONE TOTAL: 17 NG/DL (ref 20–70)

## 2022-04-05 ENCOUNTER — OFFICE VISIT (OUTPATIENT)
Dept: BARIATRICS/WEIGHT MGMT | Age: 32
End: 2022-04-05
Payer: COMMERCIAL

## 2022-04-05 VITALS
OXYGEN SATURATION: 95 % | HEART RATE: 88 BPM | BODY MASS INDEX: 50.02 KG/M2 | SYSTOLIC BLOOD PRESSURE: 138 MMHG | DIASTOLIC BLOOD PRESSURE: 88 MMHG | WEIGHT: 293 LBS | HEIGHT: 64 IN

## 2022-04-05 DIAGNOSIS — Z01.818 PREOPERATIVE CLEARANCE: ICD-10-CM

## 2022-04-05 DIAGNOSIS — Z79.899 MEDICATION MANAGEMENT: ICD-10-CM

## 2022-04-05 DIAGNOSIS — E66.01 MORBID OBESITY WITH BMI OF 50.0-59.9, ADULT (HCC): Primary | ICD-10-CM

## 2022-04-05 LAB
CONTROL: PRESENT
PREGNANCY TEST URINE, POC: NEGATIVE

## 2022-04-05 PROCEDURE — G8427 DOCREV CUR MEDS BY ELIG CLIN: HCPCS | Performed by: SURGERY

## 2022-04-05 PROCEDURE — 99214 OFFICE O/P EST MOD 30 MIN: CPT | Performed by: SURGERY

## 2022-04-05 PROCEDURE — G8417 CALC BMI ABV UP PARAM F/U: HCPCS | Performed by: SURGERY

## 2022-04-05 PROCEDURE — 1036F TOBACCO NON-USER: CPT | Performed by: SURGERY

## 2022-04-05 PROCEDURE — 93000 ELECTROCARDIOGRAM COMPLETE: CPT | Performed by: SURGERY

## 2022-04-05 PROCEDURE — 81025 URINE PREGNANCY TEST: CPT | Performed by: SURGERY

## 2022-04-05 ASSESSMENT — ENCOUNTER SYMPTOMS
RESPIRATORY NEGATIVE: 1
EYES NEGATIVE: 1
GASTROINTESTINAL NEGATIVE: 1
ALLERGIC/IMMUNOLOGIC NEGATIVE: 1

## 2022-04-05 ASSESSMENT — PATIENT HEALTH QUESTIONNAIRE - PHQ9
2. FEELING DOWN, DEPRESSED OR HOPELESS: 0
SUM OF ALL RESPONSES TO PHQ QUESTIONS 1-9: 0
1. LITTLE INTEREST OR PLEASURE IN DOING THINGS: 0
SUM OF ALL RESPONSES TO PHQ9 QUESTIONS 1 & 2: 0
SUM OF ALL RESPONSES TO PHQ QUESTIONS 1-9: 0

## 2022-04-05 NOTE — PROGRESS NOTES
Initial Bariatric Surgery Consultation History and Physical    Chief Complaint: Obesity Body mass index is 55.19 kg/m². History of Present Illness: The patient is a 27 y.o. female being seen today for initial bariatric surgery consultation. The patient previously attended a bariatric surgery informational seminar or received electronic version of presentation. The patient's PCP is Dr. Helio Haas. The patient first recognized having issues with increased weight approximately \"three years now. \"      The patient identifies the following precipitants causing, or contributing to, weight gain: thyroid issues, birth control implant    The patient estimates the lowest weight in the past five years is approximately 245 pounds. The patient estimates the highest weight in the past five years is approximately 321 pounds. The patient's current weight is 321 pounds, while the current BMI is Body mass index is 55.19 kg/m². The patient has not had previous bariatric surgery. The patient has tried the following diet(s): currently using an diogo that has pt on [address] diet    The patient has tried the following over-the-counter drugs and/or prescription weight loss medications: vitamins and apple cider vitamin gummy    The patient has tried the following physical activities/exercies: following an diogo with body weight exercises    The patient was mostly unsuccessful with previous dietary, medication, and/or physical activity for sustained weight loss. The patient reports current level of commitment to weight loss as: \"I want it bad. \"    In reviewing the patient's typical daily diet, it consists of the following:   Breakfast: cereal   Snack: denies   Lunch: doesn't eat   Snack: turkey sandwich   Dinner: whatever father cooks   Snack: denies    The patient reports drinking the following beverages throughout the day: water and zero sugar drinks    The patient reports eating outside of the house approximately 3 times per week at either fast food or sit down restaurants. The patient reports the following eating and dietary styles: nighttime eating    The patient has the following cardiovascular risk factor(s): denies    The patient has the following obesity-related disorder(s): cholelithiasis (s/p lap jackson) and depression     Past Medical History:  Past Medical History:   Diagnosis Date    Abdominal pain     Abnormal uterine bleeding (AUB)     Acute pelvic pain     Anemia 2010    Depression     Foot ulcer, left ([address] 2017    status post nerve decompression repair, excision of neoplasm, left foot, with muscle flap and graft skin application.     Graves disease Dx: 2/2019    Dr. García Gann disease     History of blood transfusion     Hypermenorrhea     Irregular menses     Liver disease     Menorrhagia     Morbid obesity ([address]     Nexplanon removal     Thyroid disorder     UTI (lower urinary tract infection)     chronic UTI's with this pregnancy - last 2014    UTI (urinary tract infection)        Past Surgical History:  Past Surgical History:   Procedure Laterality Date    ARM SURGERY Left 8/31/2021    LEFT ARM FOREIGN BODY REMOVAL performed by Frnaklin Marie MD at [address] [place] [address]      2010, 2014    CHOLECYSTECTOMY  2010    FOOT SURGERY Left 2017    lipoma removed   [address]  2013    LAPAROSCOPY  12/21/13    diagnostic    THYROIDECTOMY Bilateral 3/3/2020    TOTAL THYROIDECTOMY performed by Franklin Marie MD at [place] [address]  07/24/2012    Gastritis -Dr. Isabela Napier       Family History:  Family History   Problem Relation Age of Onset    Thyroid Disease Sister     Thyroid Disease Paternal Aunt     Cancer Maternal Grandmother     Cancer Paternal Grandmother     Diabetes Mother     Heart Disease Mother     No Known Problems Father        Social History:  Social History     Socioeconomic History    Marital status: Single Spouse name: Not on file    Number of children: Not on file    Years of education: Not on file    Highest education level: Not on file   Occupational History    Not on file   Tobacco Use    Smoking status: Former Smoker     Packs/day: 0.10     Years: 1.00     Pack years: 0.10     Types: Cigarettes     Start date:      Quit date: 2013     Years since quittin.3    Smokeless tobacco: Never Used   Vaping Use    Vaping Use: Never used   Substance and Sexual Activity    Alcohol use: No    Drug use: No     Types: Marijuana (Weed)     Comment: quit in     Sexual activity: Yes     Partners: Male   Other Topics Concern    Not on file   Social History Narrative    Not on file     Social Determinants of Health     Financial Resource Strain: Low Risk     Difficulty of Paying Living Expenses: Not hard at all   Food Insecurity: No Food Insecurity    Worried About [address] in the Last Year: Never true    [facility] [employer] in the Last Year: Never true   Transportation Needs:     Lack of Transportation (Medical): Not on file    Lack of Transportation (Non-Medical):  Not on file   Physical Activity:     Days of Exercise per Week: Not on file    Minutes of Exercise per Session: Not on file   Stress:     Feeling of Stress : Not on file   Social Connections:     Frequency of Communication with Friends and Family: Not on file    Frequency of Social Gatherings with Friends and Family: Not on file    Attends Jew Services: Not on file    Active Member of Clubs or Organizations: Not on file    Attends Club or Organization Meetings: Not on file    Marital Status: Not on file   Intimate Partner Violence:     Fear of Current or Ex-Partner: Not on file    Emotionally Abused: Not on file    Physically Abused: Not on file    Sexually Abused: Not on file   Housing Stability:     Unable to Pay for Housing in the Last Year: Not on file    Number of [place] in the Last Year: Not on file    Unstable Housing in the Last Year: Not on file       Allergies: Allergies   Allergen Reactions    Pcn [Penicillins] Hives    Morphine Hives    Gabapentin Rash       Medications:  Current Outpatient Medications   Medication Sig Dispense Refill    Prenatal Vit-Fe Fumarate-FA (PRENATAL VITAMINS) 28-0.8 MG TABS Take 1 tablet by mouth daily (any prenatal vitamin covered) 30 tablet 11    levothyroxine (SYNTHROID) 175 MCG tablet Take 1 tablet by mouth daily       ferrous sulfate 325 (65 FE) MG tablet Take 325 mg by mouth daily (with breakfast) (Patient not taking: Reported on 1/10/2022)       No current facility-administered medications for this visit. Review of Systems:  Review of Systems   Constitutional: Positive for activity change and fatigue. HENT: Negative. Eyes: Negative. Respiratory: Negative. Cardiovascular: Negative. Gastrointestinal: Negative. Endocrine: Positive for heat intolerance. Genitourinary: Negative. Musculoskeletal: Positive for arthralgias. Skin: Negative. Allergic/Immunologic: Negative. Neurological: Negative. Hematological: Negative. Psychiatric/Behavioral: Negative. Physical Exam:  Physical Exam  Vitals reviewed. Constitutional:       General: She is not in acute distress. Appearance: She is obese. She is not ill-appearing, toxic-appearing or diaphoretic. HENT:      Head: Normocephalic and atraumatic. Right Ear: External ear normal.      Left Ear: External ear normal.      Nose: Nose normal.   Eyes:      General:         Right eye: No discharge. Left eye: No discharge. Extraocular Movements: Extraocular movements intact. Neck:      Comments: Previous thyroid incision well-healed    Cardiovascular:      Rate and Rhythm: Normal rate. Pulses: Normal pulses. Pulmonary:      Effort: No respiratory distress. Abdominal:      Palpations: Abdomen is soft. Tenderness: There is no abdominal tenderness. There is no guarding or rebound. Musculoskeletal:         General: No swelling. Cervical back: Normal range of motion. Skin:     General: Skin is warm. Neurological:      General: No focal deficit present. Mental Status: She is alert. Psychiatric:         Mood and Affect: Mood normal.          Assessment and Plan:  Shannan Rosado is a 27 y.o. presenting to clinic for initial surgical evaluation for bariatric surgery. Patient Active Problem List   Diagnosis    Cyst of finger    Encounter for supervision of normal pregnancy in multigravida    Previous  delivery affecting pregnancy    Periumbilical abdominal pain    Melena    Rectal bleeding    Iron deficiency anemia    Morbid obesity with BMI of 50.0-59.9, adult ([address]    Chronic GERD    Fatty liver    Chronic bilateral low back pain without sciatica    Neoplasm of uncertain behavior of skin of thigh    Graves disease    Foreign body in soft tissue       Plan   1. Because of the known health risks associated with excess body weight, the patient is a good candidate for bariatric surgery. Reviewed with the patient both sleeve gastrectomy and RYGB, including an overview of each--with pros and cons--while showing them a picture diagram of the new anatomical surgical changes. The patient is interested in sleeve gastrectomy. Discussed with the patient the basics of the surgical procedure including risks, benefits, alternatives, and expected hospital course. Expectations were discussed with the patient and the patient agreed to proceed. The patient previously attended an informational seminar which discussed surgical and non-surgical options as well as procedure specific risks, benefits, and alternatives. 2. Will order initial bariatric surgery labs unless the patient has had any in the previous six months.     3. The patient will be scheduled to be seen by our weight management CNP and registered dietician after having the above labs drawn and resulted. 4. The patient will be referred for an evaluation by physical therapy for assistance with an exercise plan. 5. The patient will be given a bariatric handbook by the dietician at the initial nutrition class. The patient will be instructed to review the handbook and to ask questions about any part which is not clear at any time throughout the workup process. It was stressed to the patient the need to thoroughly review the handbook and the patient agreed. 6. Discussed with patient that losing weight prior to surgery can be indicator of post-operative success. Goal set at this visit for patient for weight loss prior to surgery is 20-30 lbs. 7. Discussed with patient need for long-term follow-up and vitamin supplementation. The patient is agreeable. 8. This patient is a female of reproductive age and was advised she should not become pregnant during the bariatric workup process and for at least 12-18 months after surgery. The patient is agreeable to this plan. 9. Given pt's weight will also plan on starting pt on Adipex. No issues with CAD, stroke, uncontrolled HTN. Check UDS, EKG, and urine pregnancy. Check PDMP monitoring. Done - no concerns. After 3 months of Adipex would trial Saxenda if insurance will cover. Thank you for this referral / consult. Please call with any questions or concerns you have. Patient was seen with total face-to-face time and time spent reviewing chart, placing orders, and reviewing past/current results of labs and images of over 45 minutes. More than 50% of this visit was counseling on diet, nutrition, surgical options, and education as above documented in my note.     Electronically signed by Elvira Sandy II, MD on 4/5/2022 at 4:15 PM

## 2022-04-08 ENCOUNTER — HOSPITAL ENCOUNTER (OUTPATIENT)
Age: 32
Discharge: HOME | End: 2022-04-08
Payer: COMMERCIAL

## 2022-04-08 DIAGNOSIS — Z79.899 MEDICATION MANAGEMENT: ICD-10-CM

## 2022-04-08 DIAGNOSIS — Z01.818 PREOPERATIVE CLEARANCE: ICD-10-CM

## 2022-04-08 DIAGNOSIS — E66.01 MORBID OBESITY WITH BMI OF 50.0-59.9, ADULT (HCC): ICD-10-CM

## 2022-04-08 LAB
ALBUMIN SERPL-MCNC: 3.9 GM/DL (ref 3.4–5)
ALP BLD-CCNC: 79 IU/L (ref 40–129)
ALT SERPL-CCNC: 29 U/L (ref 10–40)
AMPHETAMINES: NEGATIVE
ANION GAP SERPL CALCULATED.3IONS-SCNC: 11 MMOL/L (ref 4–16)
AST SERPL-CCNC: 27 IU/L (ref 15–37)
BARBITURATE SCREEN URINE: NEGATIVE
BASOPHILS ABSOLUTE: 0.1 K/CU MM
BASOPHILS RELATIVE PERCENT: 0.4 % (ref 0–1)
BENZODIAZEPINE SCREEN, URINE: NEGATIVE
BILIRUB SERPL-MCNC: 0.6 MG/DL (ref 0–1)
BUN BLDV-MCNC: 9 MG/DL (ref 6–23)
CALCIUM SERPL-MCNC: 8.7 MG/DL (ref 8.3–10.6)
CANNABINOID SCREEN URINE: NEGATIVE
CHLORIDE BLD-SCNC: 103 MMOL/L (ref 99–110)
CHOLESTEROL: 153 MG/DL
CO2: 25 MMOL/L (ref 21–32)
COCAINE METABOLITE: NEGATIVE
CREAT SERPL-MCNC: 0.7 MG/DL (ref 0.6–1.1)
DIFFERENTIAL TYPE: ABNORMAL
EOSINOPHILS ABSOLUTE: 0.2 K/CU MM
EOSINOPHILS RELATIVE PERCENT: 1.6 % (ref 0–3)
ESTIMATED AVERAGE GLUCOSE: 143 MG/DL
GFR AFRICAN AMERICAN: >60 ML/MIN/1.73M2
GFR NON-AFRICAN AMERICAN: >60 ML/MIN/1.73M2
GLUCOSE BLD-MCNC: 127 MG/DL (ref 70–99)
HBA1C MFR BLD: 6.6 % (ref 4.2–6.3)
HCT VFR BLD CALC: 41 % (ref 37–47)
HDLC SERPL-MCNC: 31 MG/DL
HEMOGLOBIN: 11.6 GM/DL (ref 12.5–16)
IMMATURE NEUTROPHIL %: 0.4 % (ref 0–0.43)
IRON: 26 UG/DL (ref 37–145)
LDL CHOLESTEROL CALCULATED: 98 MG/DL
LYMPHOCYTES ABSOLUTE: 3.2 K/CU MM
LYMPHOCYTES RELATIVE PERCENT: 23.9 % (ref 24–44)
MAGNESIUM: 2.2 MG/DL (ref 1.8–2.4)
MCH RBC QN AUTO: 22.4 PG (ref 27–31)
MCHC RBC AUTO-ENTMCNC: 28.3 % (ref 32–36)
MCV RBC AUTO: 79.2 FL (ref 78–100)
MONOCYTES ABSOLUTE: 0.6 K/CU MM
MONOCYTES RELATIVE PERCENT: 4.1 % (ref 0–4)
NUCLEATED RBC %: 0 %
OPIATES, URINE: NEGATIVE
OXYCODONE: NEGATIVE
PCT TRANSFERRIN: 10 % (ref 10–44)
PDW BLD-RTO: 16.8 % (ref 11.7–14.9)
PHENCYCLIDINE, URINE: NEGATIVE
PLATELET # BLD: 438 K/CU MM (ref 140–440)
PMV BLD AUTO: 10.5 FL (ref 7.5–11.1)
POTASSIUM SERPL-SCNC: 4.2 MMOL/L (ref 3.5–5.1)
RBC # BLD: 5.18 M/CU MM (ref 4.2–5.4)
SEGMENTED NEUTROPHILS ABSOLUTE COUNT: 9.4 K/CU MM
SEGMENTED NEUTROPHILS RELATIVE PERCENT: 69.6 % (ref 36–66)
SODIUM BLD-SCNC: 139 MMOL/L (ref 135–145)
TOTAL IMMATURE NEUTOROPHIL: 0.06 K/CU MM
TOTAL IRON BINDING CAPACITY: 257 UG/DL (ref 250–450)
TOTAL NUCLEATED RBC: 0 K/CU MM
TOTAL PROTEIN: 7.5 GM/DL (ref 6.4–8.2)
TRIGL SERPL-MCNC: 118 MG/DL
TSH HIGH SENSITIVITY: 9.91 UIU/ML (ref 0.27–4.2)
UNSATURATED IRON BINDING CAPACITY: 231 UG/DL (ref 110–370)
VITAMIN D 25-HYDROXY: 14.89 NG/ML
WBC # BLD: 13.5 K/CU MM (ref 4–10.5)

## 2022-04-08 PROCEDURE — 83540 ASSAY OF IRON: CPT

## 2022-04-08 PROCEDURE — 83036 HEMOGLOBIN GLYCOSYLATED A1C: CPT

## 2022-04-08 PROCEDURE — 84443 ASSAY THYROID STIM HORMONE: CPT

## 2022-04-08 PROCEDURE — 82746 ASSAY OF FOLIC ACID SERUM: CPT

## 2022-04-08 PROCEDURE — 82306 VITAMIN D 25 HYDROXY: CPT

## 2022-04-08 PROCEDURE — 80061 LIPID PANEL: CPT

## 2022-04-08 PROCEDURE — 85025 COMPLETE CBC W/AUTO DIFF WBC: CPT

## 2022-04-08 PROCEDURE — 84630 ASSAY OF ZINC: CPT

## 2022-04-08 PROCEDURE — 83735 ASSAY OF MAGNESIUM: CPT

## 2022-04-08 PROCEDURE — 36415 COLL VENOUS BLD VENIPUNCTURE: CPT

## 2022-04-08 PROCEDURE — 82607 VITAMIN B-12: CPT

## 2022-04-08 PROCEDURE — 80307 DRUG TEST PRSMV CHEM ANLYZR: CPT

## 2022-04-08 PROCEDURE — 83970 ASSAY OF PARATHORMONE: CPT

## 2022-04-08 PROCEDURE — 80053 COMPREHEN METABOLIC PANEL: CPT

## 2022-04-08 PROCEDURE — 83550 IRON BINDING TEST: CPT

## 2022-04-11 LAB — PARATHYROID HORMONE INTACT: 67 PG/ML (ref 15–65)

## 2022-04-12 LAB — ZINC: 65.6 UG/DL (ref 60–120)

## 2022-04-13 ENCOUNTER — OFFICE VISIT (OUTPATIENT)
Dept: BARIATRICS/WEIGHT MGMT | Age: 32
End: 2022-04-13
Payer: COMMERCIAL

## 2022-04-13 VITALS
BODY MASS INDEX: 50.02 KG/M2 | HEIGHT: 64 IN | SYSTOLIC BLOOD PRESSURE: 120 MMHG | WEIGHT: 293 LBS | HEART RATE: 68 BPM | OXYGEN SATURATION: 95 % | DIASTOLIC BLOOD PRESSURE: 80 MMHG

## 2022-04-13 DIAGNOSIS — Z79.899 MEDICATION MANAGEMENT: ICD-10-CM

## 2022-04-13 DIAGNOSIS — E66.01 MORBID OBESITY WITH BMI OF 50.0-59.9, ADULT (HCC): Primary | ICD-10-CM

## 2022-04-13 PROCEDURE — 99213 OFFICE O/P EST LOW 20 MIN: CPT | Performed by: NURSE PRACTITIONER

## 2022-04-13 PROCEDURE — G8417 CALC BMI ABV UP PARAM F/U: HCPCS | Performed by: NURSE PRACTITIONER

## 2022-04-13 PROCEDURE — G8427 DOCREV CUR MEDS BY ELIG CLIN: HCPCS | Performed by: NURSE PRACTITIONER

## 2022-04-13 PROCEDURE — 1036F TOBACCO NON-USER: CPT | Performed by: NURSE PRACTITIONER

## 2022-04-13 RX ORDER — PHENTERMINE HYDROCHLORIDE 37.5 MG/1
37.5 TABLET ORAL
Qty: 30 TABLET | Refills: 0 | Status: SHIPPED | OUTPATIENT
Start: 2022-04-13 | End: 2022-05-13

## 2022-04-13 ASSESSMENT — PATIENT HEALTH QUESTIONNAIRE - PHQ9
SUM OF ALL RESPONSES TO PHQ QUESTIONS 1-9: 0
1. LITTLE INTEREST OR PLEASURE IN DOING THINGS: 0
SUM OF ALL RESPONSES TO PHQ9 QUESTIONS 1 & 2: 0
2. FEELING DOWN, DEPRESSED OR HOPELESS: 0
SUM OF ALL RESPONSES TO PHQ QUESTIONS 1-9: 0

## 2022-04-13 ASSESSMENT — ENCOUNTER SYMPTOMS
RESPIRATORY NEGATIVE: 1
GASTROINTESTINAL NEGATIVE: 1
ALLERGIC/IMMUNOLOGIC NEGATIVE: 1

## 2022-04-13 NOTE — PROGRESS NOTES
Chief Complaint   Patient presents with    Follow-up     MED WM          SUBJECTIVE:    HPI: Patient is here with complaints of weight gain and inability to lose weight per self. Inquiring about weight loss medications to assist with their weight loss goal.    Obesity with a BMI of Body mass index is 54.95 kg/m². Kevin Byrd Patient has failed to lose 5% of body weight since 2017. Any history of glaucoma? DENIES    Any history of drug abuse? DENIES    Any history of CAD, uncontrolled HTN, arrhythmias, stroke, or CHF? ? DENIES    Any history of hyperthyroidism? Total thyroidectomy    Any current or recent use of MAOIs? DENIES    Pregnant or breastfeeding? DENIES    Current dietary measures: not tracking currently    Current exercise measures: not currently    Patient has re-started the surgical weight loss program. Dr. Graciela Norton referred pt for weight loss medication program to assist with weight loss goals. I have reviewed the patient's(pertinent information to this visit) medical history, family history(scanned in  the [address] [place] [address] under \"patient questioner\"), social history and review of systems with the patient today in the office.           Past Surgical History:   Procedure Laterality Date    ARM SURGERY Left 8/31/2021    LEFT ARM FOREIGN BODY REMOVAL performed by Fuad Cortez MD at [facility]      2010, 2014    CHOLECYSTECTOMY  2010    FOOT SURGERY Left 2017    lipoma removed   [address]  2013    LAPAROSCOPY  12/21/13    diagnostic    THYROIDECTOMY Bilateral 3/3/2020    TOTAL THYROIDECTOMY performed by Fuad Cortez MD at [facility]  07/24/2012    Gastritis -Dr. Michelle Greene       Past Medical History:   Diagnosis Date    Abdominal pain     Abnormal uterine bleeding (AUB)     Acute pelvic pain     Anemia 2010    Depression     Foot ulcer, left ([address] 2017    status post nerve decompression repair, excision of neoplasm, left foot, with muscle flap and graft skin application.  Graves disease Dx: 2019    Dr. Oksana Rodriguez disease     History of blood transfusion     Hypermenorrhea     Irregular menses     Liver disease     Menorrhagia     Morbid obesity ([address]     Nexplanon removal     Thyroid disorder     UTI (lower urinary tract infection)     chronic UTI's with this pregnancy - last     UTI (urinary tract infection)        Family History   Problem Relation Age of Onset    Thyroid Disease Sister     Thyroid Disease Paternal Aunt     Cancer Maternal Grandmother     Cancer Paternal Grandmother     Diabetes Mother     Heart Disease Mother     No Known Problems Father        Social History     Socioeconomic History    Marital status: Single     Spouse name: Not on file    Number of children: Not on file    Years of education: Not on file    Highest education level: Not on file   Occupational History    Not on file   Tobacco Use    Smoking status: Former Smoker     Packs/day: 0.10     Years: 1.00     Pack years: 0.10     Types: Cigarettes     Start date:      Quit date: 2013     Years since quittin.3    Smokeless tobacco: Never Used   Vaping Use    Vaping Use: Never used   Substance and Sexual Activity    Alcohol use: No    Drug use: No     Types: Marijuana (Weed)     Comment: quit in     Sexual activity: Yes     Partners: Male   Other Topics Concern    Not on file   Social History Narrative    Not on file     Social Determinants of Health     Financial Resource Strain: Low Risk     Difficulty of Paying Living Expenses: Not hard at all   Food Insecurity: No Food Insecurity    Worried About Running Out of Food in the Last Year: Never true    [place]opal of Food in the Last Year: Never true   Transportation Needs:     Lack of Transportation (Medical): Not on file    Lack of Transportation (Non-Medical):  Not on file   Physical Activity:     Days of Exercise per Week: Not on file    Minutes of Exercise per Session: Not on file   Stress:     Feeling of Stress : Not on file   Social Connections:     Frequency of Communication with Friends and Family: Not on file    Frequency of Social Gatherings with Friends and Family: Not on file    Attends Mosque Services: Not on file    Active Member of [address] [place] [address] or Organizations: Not on file    Attends Club or Organization Meetings: Not on file    Marital Status: Not on file   Intimate Partner Violence:     Fear of Current or Ex-Partner: Not on file    Emotionally Abused: Not on file    Physically Abused: Not on file    Sexually Abused: Not on file   Housing Stability:     Unable to Pay for Housing in the Last Year: Not on file    Number of [place] in the Last Year: Not on file    Unstable Housing in the Last Year: Not on file       Current Outpatient Medications   Medication Sig Dispense Refill    phentermine (ADIPEX-P) 37.5 MG tablet Take 1 tablet by mouth every morning (before breakfast) for 30 days. 1/2 tab daily x3-5 days and then full tab. BMI 30 tablet 0    Prenatal Vit-Fe Fumarate-FA (PRENATAL VITAMINS) 28-0.8 MG TABS Take 1 tablet by mouth daily (any prenatal vitamin covered) 30 tablet 11    levothyroxine (SYNTHROID) 175 MCG tablet Take 1 tablet by mouth daily       ferrous sulfate 325 (65 FE) MG tablet Take 325 mg by mouth daily (with breakfast) (Patient not taking: Reported on 1/10/2022)       No current facility-administered medications for this visit. Allergies   Allergen Reactions    Pcn [Penicillins] Hives    Morphine Hives    Gabapentin Rash       Review of Systems:         Review of Systems   Constitutional: Positive for fatigue. HENT: Negative. Respiratory: Negative. Cardiovascular: Negative. Gastrointestinal: Negative. Endocrine: Positive for heat intolerance. Genitourinary: Negative. Musculoskeletal: Positive for myalgias. Skin: Negative. Allergic/Immunologic: Negative.     Neurological: Negative. Hematological: Negative. Psychiatric/Behavioral: Negative. OBJECTIVE:  Physical Exam:    /80 (Site: Left Upper Arm, Position: Sitting, Cuff Size: Large Adult)   Pulse 68   Ht 5' 4\" (1.626 m)   Wt (!) 320 lb 1.6 oz (145.2 kg)   SpO2 95%   BMI 54.95 kg/m²      Physical Exam  Constitutional:       Appearance: Normal appearance. HENT:      Head: Normocephalic. Nose: Nose normal.      Mouth/Throat:      Pharynx: Oropharynx is clear. Eyes:      Conjunctiva/sclera: Conjunctivae normal.      Pupils: Pupils are equal, round, and reactive to light. Cardiovascular:      Rate and Rhythm: Normal rate. Pulses: Normal pulses. Pulmonary:      Effort: Pulmonary effort is normal.      Breath sounds: Normal breath sounds. Abdominal:      General: Bowel sounds are normal.      Comments: Large ABD   Musculoskeletal:         General: Normal range of motion. Cervical back: Normal range of motion. Skin:     General: Skin is warm and dry. Capillary Refill: Capillary refill takes less than 2 seconds. Neurological:      General: No focal deficit present. Mental Status: She is alert and oriented to person, place, and time. Psychiatric:         Mood and Affect: Mood normal.         Behavior: Behavior normal.           ASSESSMENT:  1. Morbid obesity with BMI of 50.0-59.9, adult ([facility]  - Start tracking calories; about 1200  daily  - 64 oz water daily  - Increase activity as able  - Referral RD    2. Medication management  - Labs, EKG, UDS reviewed  - Meets criteria to start Adipex  - phentermine (ADIPEX-P) 37.5 MG tablet; Take 1 tablet by mouth every morning (before breakfast) for 30 days. 1/2 tab daily x3-5 days and then full tab. BMI  Dispense: 30 tablet;  Refill: 0      PLAN:    - Recent Labs, UDS, and EKG reviewed and WNL    - Pregnancy test= negative    - Patient will start on Adipex in am and take as directed    - Call for adverse side effects or concerns    - BMI is over 27, or over 27 with weight-related risk factors. - Pt aware Adipex can only be used short term (3 months). - Pt aware no breaks in treatment allowed or must be off for 6 months. - Pt aware that weight must be lost at each visit or medication will be discontinued. - Pt is aware that in order to be successful, must combine Adipex with appropriate diet and exercise plan. - Pt provided with medication handout that covers use; side effects (common side effects include insomnia, dry mouth, constipation, nervousness, increased heart rate, hypertension);    precautions; and interactions. - Pt aware must meet monthly in person while on this medication.     - Check [place] Automated Rx Reporting System. Any concerns: NONE Reported     - Will need monthly pregnancy tests. Pt aware pregnancy and breast feeding contraindicated while using this medication. Current birth control measures include: none    - If elderly or renal impairment, will use lower dose (15 mg daily) and avoid all together if GFR is less than 15. N/A     - RTC ONE MONTH      No orders of the defined types were placed in this encounter. Orders Placed This Encounter   Medications    phentermine (ADIPEX-P) 37.5 MG tablet     Sig: Take 1 tablet by mouth every morning (before breakfast) for 30 days. 1/2 tab daily x3-5 days and then full tab. BMI     Dispense:  30 tablet     Refill:  0        Follow Up:  Return in about 1 month (around 5/13/2022).       DADA Pollard - CNP

## 2022-04-24 LAB
FOLATE: 12.5 NG/ML (ref 3.1–17.5)
VITAMIN B-12: 437.5 PG/ML (ref 211–911)

## 2022-05-05 ENCOUNTER — OFFICE VISIT (OUTPATIENT)
Dept: BARIATRICS/WEIGHT MGMT | Age: 32
End: 2022-05-05
Payer: COMMERCIAL

## 2022-05-05 ENCOUNTER — OFFICE VISIT (OUTPATIENT)
Dept: BARIATRICS/WEIGHT MGMT | Age: 32
End: 2022-05-05

## 2022-05-05 VITALS
BODY MASS INDEX: 50.02 KG/M2 | HEIGHT: 64 IN | SYSTOLIC BLOOD PRESSURE: 128 MMHG | OXYGEN SATURATION: 99 % | HEART RATE: 83 BPM | WEIGHT: 293 LBS | DIASTOLIC BLOOD PRESSURE: 82 MMHG

## 2022-05-05 VITALS — BODY MASS INDEX: 50.02 KG/M2 | HEIGHT: 64 IN | WEIGHT: 293 LBS

## 2022-05-05 DIAGNOSIS — E66.01 MORBID OBESITY WITH BMI OF 50.0-59.9, ADULT (HCC): Primary | ICD-10-CM

## 2022-05-05 PROCEDURE — G8428 CUR MEDS NOT DOCUMENT: HCPCS | Performed by: NURSE PRACTITIONER

## 2022-05-05 PROCEDURE — 99214 OFFICE O/P EST MOD 30 MIN: CPT | Performed by: NURSE PRACTITIONER

## 2022-05-05 PROCEDURE — 1036F TOBACCO NON-USER: CPT | Performed by: NURSE PRACTITIONER

## 2022-05-05 PROCEDURE — G8417 CALC BMI ABV UP PARAM F/U: HCPCS | Performed by: NURSE PRACTITIONER

## 2022-05-05 PROCEDURE — 99999 PR OFFICE/OUTPT VISIT,PROCEDURE ONLY: CPT

## 2022-05-05 NOTE — PROGRESS NOTES
Outpatient Nutrition Counseling    REASON FOR VISIT: Initial Surgical Class    Chief Complaint:    Chief Complaint   Patient presents with    Weight Management       SUBJECTIVE:  Pt here for Initial surgical class. Pt instructed on mindful eating, label reading, calori counting, healthy food choices and goal setting. Pt was provided food list, meal plan and goal card. Pt verbalized understanding and signed goal card. The patient is a 27 y.o. female being seen for morbid obesity, considering weight loss surgery; Juani's, Height: 5' 4\" (162.6 cm), Weight: (!) 313 lb 3.2 oz (142.1 kg), Current Body mass index is 53.76 kg/m². The patient's PCP is Lizbeth Armando MD   Juani's life is significantly affected by weight related to her co-morbidities. Comorbid Conditions:  Significant diseases affecting this patient are   Past Medical History:   Diagnosis Date    Abdominal pain     Abnormal uterine bleeding (AUB)     Acute pelvic pain     Anemia 2010    Depression     Foot ulcer, left ([address] 2017    status post nerve decompression repair, excision of neoplasm, left foot, with muscle flap and graft skin application.  Graves disease Dx: 2/2019    Dr. Cuenca Stage disease     History of blood transfusion     Hypermenorrhea     Irregular menses     Liver disease     Menorrhagia     Morbid obesity ([address]     Nexplanon removal     Thyroid disorder     UTI (lower urinary tract infection)     chronic UTI's with this pregnancy - last 2014    UTI (urinary tract infection)    . Review of Systems - Review of Systems  Otherwise per HPI. Allergies:   Allergies   Allergen Reactions    Pcn [Penicillins] Hives    Morphine Hives    Gabapentin Rash       Past Surgical History:  Past Surgical History:   Procedure Laterality Date    ARM SURGERY Left 8/31/2021    LEFT ARM FOREIGN BODY REMOVAL performed by Fuad Cortez MD at [address] [place] [address]      2010, 2014    CHOLECYSTECTOMY  2010    FOOT SURGERY Left 2017    lipoma removed    HERNIA REPAIR      HERNIA REPAIR  2013    LAPAROSCOPY  13    diagnostic    THYROIDECTOMY Bilateral 3/3/2020    TOTAL THYROIDECTOMY performed by Trevin Weiss MD at [address]  2012    [place] -Dr. Lily Lerma       Family History:  Family History   Problem Relation Age of Onset    Thyroid Disease Sister     Thyroid Disease Paternal Aunt     Cancer Maternal Grandmother     Cancer Paternal Grandmother     Diabetes Mother     Heart Disease Mother     No Known Problems Father        Social History:  Social History     Socioeconomic History    Marital status: Single     Spouse name: Not on file    Number of children: Not on file    Years of education: Not on file    Highest education level: Not on file   Occupational History    Not on file   Tobacco Use    Smoking status: Former Smoker     Packs/day: 0.10     Years: 1.00     Pack years: 0.10     Types: Cigarettes     Start date:      Quit date: 2013     Years since quittin.3    Smokeless tobacco: Never Used   Vaping Use    Vaping Use: Never used   Substance and Sexual Activity    Alcohol use: No    Drug use: No     Types: Marijuana (Weed)     Comment: quit in     Sexual activity: Yes     Partners: Male   Other Topics Concern    Not on file   Social History Narrative    Not on file     Social Determinants of Health     Financial Resource Strain: Low Risk     Difficulty of Paying Living Expenses: Not hard at all   Food Insecurity: No Food Insecurity    Worried About [address] in the Last Year: Never true    [address]  in the Last Year: Never true   Transportation Needs:     Lack of Transportation (Medical): Not on file    Lack of Transportation (Non-Medical):  Not on file   Physical Activity:     Days of Exercise per Week: Not on file    Minutes of Exercise per Session: Not on file   Stress:     Feeling of Stress : Not on file Social Connections:     Frequency of Communication with Friends and Family: Not on file    Frequency of Social Gatherings with Friends and Family: Not on file    Attends Hindu Services: Not on file    Active Member of Clubs or Organizations: Not on file    Attends Club or Organization Meetings: Not on file    Marital Status: Not on file   Intimate Partner Violence:     Fear of Current or Ex-Partner: Not on file    Emotionally Abused: Not on file    Physically Abused: Not on file    Sexually Abused: Not on file   Housing Stability:     Unable to Pay for Housing in the Last Year: Not on file    Number of [place] in the Last Year: Not on file    Unstable Housing in the Last Year: Not on file         OBJECTIVE:  Physical Exam   Ht 5' 4\" (1.626 m)   Wt (!) 313 lb 3.2 oz (142.1 kg)   BMI 53.76 kg/m²        NUTRITION DIAGNOSIS: Overweight / Obesity   Problem: Increased adiposity compared to reference standard or established norms   Etiology: Excess intake compared to output over time   S/S: Ht: 64\" Wt: 313.2 lbs BMI: 53.76    NUTRITION INTERVENTIONS:    Individualized treatment goals to address nutritiondiagnosis:   Instructed on 1200 kcal diet for weight loss   Provided sample menus, food lists, and goal card Encouraged Physical activity as approved by physician    MONITORING/ EVALUATION/ PLAN:   Pt verbalized understanding of allmaterials covered   Pt asked pertinent questions throughout the session - expect compliance with nutrition guidelines presented   Provided pt with contact information should questions arise prior to next visit   Will f/u with pt in 4-5 months for further education and post-op diet instructions  OC Grissom MS, RDN, LD  5/5/2022

## 2022-05-05 NOTE — PROGRESS NOTES
Juani Matthews  1990  27 y.o. SUBJECT RADHA:  HPI: Jamshid Bradshaw presents today for Initial Surgical Weight Loss Class instructed by this provider and registered dietician. Patient had an initial individual consult with Dr. Robyn Zacarias for surgical weight loss management and agrees to follow the program guidelines. Chief Complaint   Patient presents with    Other     dual class    Education Class  Past Medical History:   Diagnosis Date    Abdominal pain     Abnormal uterine bleeding (AUB)     Acute pelvic pain     Anemia 2010    Depression     Foot ulcer, left ([address] 2017    status post nerve decompression repair, excision of neoplasm, left foot, with muscle flap and graft skin application.  Graves disease Dx: 2/2019    Dr. Shannan Lyon disease     History of blood transfusion     Hypermenorrhea     Irregular menses     Liver disease     Menorrhagia     Morbid obesity ([address]     Nexplanon removal     Thyroid disorder     UTI (lower urinary tract infection)     chronic UTI's with this pregnancy - last 2014    UTI (urinary tract infection)      Past Surgical History:   Procedure Laterality Date    ARM SURGERY Left 8/31/2021    LEFT ARM FOREIGN BODY REMOVAL performed by Rafat Norman MD at [address] [place] [address]      2010, 2014    CHOLECYSTECTOMY  2010    FOOT SURGERY Left 2017    lipoma removed   [facility]  2013    LAPAROSCOPY  12/21/13    diagnostic    THYROIDECTOMY Bilateral 3/3/2020    TOTAL THYROIDECTOMY performed by Rafat Norman MD at [address]  07/24/2012    Gastritis -Dr. Roslyn Watters     Current Outpatient Medications   Medication Sig Dispense Refill    phentermine (ADIPEX-P) 37.5 MG tablet Take 1 tablet by mouth every morning (before breakfast) for 30 days. 1/2 tab daily x3-5 days and then full tab.  BMI 30 tablet 0    Prenatal Vit-Fe Fumarate-FA (PRENATAL VITAMINS) 28-0.8 MG TABS Take 1 tablet by mouth daily (any prenatal vitamin covered) 30 tablet 11    levothyroxine (SYNTHROID) 175 MCG tablet Take 1 tablet by mouth daily       ferrous sulfate 325 (65 FE) MG tablet Take 325 mg by mouth daily (with breakfast) (Patient not taking: Reported on 1/10/2022)       No current facility-administered medications for this visit. Family History   Problem Relation Age of Onset    Thyroid Disease Sister     Thyroid Disease Paternal Aunt     Cancer Maternal Grandmother     Cancer Paternal Grandmother     Diabetes Mother     Heart Disease Mother     No Known Problems Father      Allergies   Allergen Reactions    Pcn [Penicillins] Hives    Morphine Hives    Gabapentin Rash        OBJECTIVE:     /82 (Site: Left Upper Arm, Position: Sitting, Cuff Size: Medium Adult)   Pulse 83   Ht 5' 4\" (1.626 m)   Wt (!) 313 lb 3.2 oz (142.1 kg)   SpO2 99%   BMI 53.76 kg/m²   Wt Readings from Last 3 Encounters:   05/05/22 (!) 313 lb 3.2 oz (142.1 kg)   04/13/22 (!) 320 lb 1.6 oz (145.2 kg)   04/05/22 (!) 321 lb 8 oz (145.8 kg)       - Patient attended the Surgical Weight Loss Group class today. - Instructed on risks of obesity, criteria for surgical intervention, advantages of weight loss surgery and the program requirements. - Also instructed on mindful eating, label reading, calorie counting, healthy food choices, and goal setting.    - Patient was provided a foods list, meal plan, and goal card. - Patient asked pertinent questions throughout session and answers given. - Patient voiced understanding of all information provided. - Patient is to refer to surgical handbook or to call office if questions arise.     - Goal card signed. Patient was seen with total face to face time of 40 minutes in a group setting. More than 50% of this visit was counseling on obesity, strict diet recommendations, nutrition and education as above in my assessment and plan section of my note.

## 2022-05-12 ENCOUNTER — OFFICE VISIT (OUTPATIENT)
Dept: BARIATRICS/WEIGHT MGMT | Age: 32
End: 2022-05-12
Payer: COMMERCIAL

## 2022-05-12 VITALS
HEART RATE: 87 BPM | OXYGEN SATURATION: 96 % | DIASTOLIC BLOOD PRESSURE: 92 MMHG | SYSTOLIC BLOOD PRESSURE: 142 MMHG | BODY MASS INDEX: 50.02 KG/M2 | WEIGHT: 293 LBS | HEIGHT: 64 IN

## 2022-05-12 DIAGNOSIS — Z79.899 MEDICATION MANAGEMENT: ICD-10-CM

## 2022-05-12 DIAGNOSIS — D50.9 IRON DEFICIENCY ANEMIA, UNSPECIFIED IRON DEFICIENCY ANEMIA TYPE: ICD-10-CM

## 2022-05-12 DIAGNOSIS — E66.01 MORBID OBESITY WITH BMI OF 50.0-59.9, ADULT (HCC): Primary | ICD-10-CM

## 2022-05-12 DIAGNOSIS — Z01.818 PRE-OP EVALUATION: ICD-10-CM

## 2022-05-12 DIAGNOSIS — E89.0 HX OF TOTAL THYROIDECTOMY: ICD-10-CM

## 2022-05-12 PROCEDURE — 99213 OFFICE O/P EST LOW 20 MIN: CPT | Performed by: NURSE PRACTITIONER

## 2022-05-12 PROCEDURE — 1036F TOBACCO NON-USER: CPT | Performed by: NURSE PRACTITIONER

## 2022-05-12 PROCEDURE — G8417 CALC BMI ABV UP PARAM F/U: HCPCS | Performed by: NURSE PRACTITIONER

## 2022-05-12 PROCEDURE — G8427 DOCREV CUR MEDS BY ELIG CLIN: HCPCS | Performed by: NURSE PRACTITIONER

## 2022-05-12 RX ORDER — FERROUS SULFATE 325(65) MG
325 TABLET ORAL
Qty: 30 TABLET | Refills: 5 | Status: SHIPPED | OUTPATIENT
Start: 2022-05-12

## 2022-05-12 RX ORDER — PHENTERMINE HYDROCHLORIDE 37.5 MG/1
37.5 TABLET ORAL
Qty: 30 TABLET | Refills: 0 | Status: SHIPPED | OUTPATIENT
Start: 2022-05-12 | End: 2022-06-11

## 2022-05-12 ASSESSMENT — PATIENT HEALTH QUESTIONNAIRE - PHQ9
SUM OF ALL RESPONSES TO PHQ9 QUESTIONS 1 & 2: 0
2. FEELING DOWN, DEPRESSED OR HOPELESS: 0
1. LITTLE INTEREST OR PLEASURE IN DOING THINGS: 0
SUM OF ALL RESPONSES TO PHQ QUESTIONS 1-9: 0

## 2022-05-12 NOTE — PROGRESS NOTES
BARIATRIC SURGERY OFFICE PROGRESS NOTE    SUBJECTIVE:    Patient presenting today referred from Bevely Hodgkin, MD, for   Chief Complaint   Patient presents with    Weight Management     2nd surg wm    . Vitals:    05/12/22 1408   BP: (!) 142/92   Pulse: 87   SpO2: 96%        BMI: Body mass index is 53.43 kg/m². Weight History: Wt Readings from Last 3 Encounters:   05/12/22 (!) 311 lb 4.8 oz (141.2 kg)   05/05/22 (!) 313 lb 3.2 oz (142.1 kg)   05/05/22 (!) 313 lb 3.2 oz (142.1 kg)         HPI:Juani Jose is a 27 y.o. female presenting in second bariatric PRE-OP visit    Diet Recall: Total weight loss/gain: -1.9 lbs since last visit, and -10.2 lbs since starting program     Protein: tracking calories; about 900-1300  Water Intake: 64 oz daily  Exercise: started at gym; treadmill  New health problems: Denies  New medications: Denies  Clearances:  -- Cardiology: referral sent  -- Pulmonology: referral sent  -- Psychology: referral sent    Thoroughly reviewed the patient's medical history, family history, social history and review of systems with the patient today in the office. Please see medical record for pertinent positives. Past Medical History:   Diagnosis Date    Abdominal pain     Abnormal uterine bleeding (AUB)     Acute pelvic pain     Anemia 2010    Depression     Foot ulcer, left ([address] 2017    status post nerve decompression repair, excision of neoplasm, left foot, with muscle flap and graft skin application.     Graves disease Dx: 2/2019    Dr. Oc Leyva disease     History of blood transfusion     Hypermenorrhea     Irregular menses     Liver disease     Menorrhagia     Morbid obesity ([address]     Nexplanon removal     Thyroid disorder     UTI (lower urinary tract infection)     chronic UTI's with this pregnancy - last 2014    UTI (urinary tract infection)         Patient Active Problem List   Diagnosis    Cyst of finger    Encounter for supervision of normal pregnancy in multigravida    Previous  delivery affecting pregnancy    Periumbilical abdominal pain    Melena    Rectal bleeding    Iron deficiency anemia    Morbid obesity with BMI of 50.0-59.9, adult (HCC)    Chronic GERD    Fatty liver    Chronic bilateral low back pain without sciatica    Neoplasm of uncertain behavior of skin of thigh    Graves disease    Foreign body in soft tissue       Past Surgical History:   Procedure Laterality Date    ARM SURGERY Left 2021    LEFT ARM FOREIGN BODY REMOVAL performed by Nargis Addison MD at [address] [place] [address]      ,     CHOLECYSTECTOMY      FOOT SURGERY Left 2017    lipoma removed   [facility]      LAPAROSCOPY  13    diagnostic    THYROIDECTOMY Bilateral 3/3/2020    TOTAL THYROIDECTOMY performed by Nargis Addison MD at [address]  2012    Gastritis -Dr. Perla Short       Current Outpatient Medications   Medication Sig Dispense Refill    ferrous sulfate (IRON 325) 325 (65 Fe) MG tablet Take 1 tablet by mouth 3 times daily (with meals) 30 tablet 5    phentermine (ADIPEX-P) 37.5 MG tablet Take 1 tablet by mouth every morning (before breakfast) for 30 days. BMI 44. 30 tablet 0    phentermine (ADIPEX-P) 37.5 MG tablet Take 1 tablet by mouth every morning (before breakfast) for 30 days. 1/2 tab daily x3-5 days and then full tab. BMI 30 tablet 0    levothyroxine (SYNTHROID) 175 MCG tablet Take 1 tablet by mouth daily       Prenatal Vit-Fe Fumarate-FA (PRENATAL VITAMINS) 28-0.8 MG TABS Take 1 tablet by mouth daily (any prenatal vitamin covered) 30 tablet 11     No current facility-administered medications for this visit. Allergies   Allergen Reactions    Pcn [Penicillins] Hives    Morphine Hives    Gabapentin Rash         Review of Systems   Constitutional: Negative. HENT: Negative. Eyes: Negative. Respiratory: Negative.     Cardiovascular: Negative. Gastrointestinal: Negative. Endocrine: Negative. Genitourinary: Negative. Musculoskeletal: Negative. Skin: Negative. Allergic/Immunologic: Negative. Neurological: Negative. Hematological: Negative. Psychiatric/Behavioral: Negative. OBJECTIVE:    BP (!) 142/92   Pulse 87   Ht 5' 4\" (1.626 m)   Wt (!) 311 lb 4.8 oz (141.2 kg)   SpO2 96%   BMI 53.43 kg/m²      Physical Exam  Constitutional:       Appearance: Normal appearance. HENT:      Head: Normocephalic. Nose: Nose normal.      Mouth/Throat:      Pharynx: Oropharynx is clear. Eyes:      Conjunctiva/sclera: Conjunctivae normal.      Pupils: Pupils are equal, round, and reactive to light. Cardiovascular:      Rate and Rhythm: Normal rate. Pulses: Normal pulses. Pulmonary:      Effort: Pulmonary effort is normal.      Breath sounds: Normal breath sounds. Abdominal:      General: Bowel sounds are normal.   Musculoskeletal:         General: Normal range of motion. Cervical back: Normal range of motion. Skin:     General: Skin is warm and dry. Capillary Refill: Capillary refill takes less than 2 seconds. Neurological:      General: No focal deficit present. Mental Status: She is alert and oriented to person, place, and time. Psychiatric:         Mood and Affect: Mood normal.         Behavior: Behavior normal.         ASSESSMENT & PLAN:    1. Morbid obesity with BMI of 50.0-59.9, adult [facility])  -Patient was encouraged to journal all food intake.   -Keep calorie level at approximately 1200, per discussion / plan with registered dietician.  -Protein intake is to be a minimum of 60 grams per day. -Water drinking was encouraged with a goal of 64oz-128oz daily. Beverages are to be calorie free except for milk. Avoid soda. -Continue to increase level of physical activity.     2. Medication management  -Down 1.2 pounds this visit; 10 pounds lost total  - Starting month 2 of 3  - Denies any side effects  - Rx refill sent  - phentermine (ADIPEX-P) 37.5 MG tablet; Take 1 tablet by mouth every morning (before breakfast) for 30 days. BMI 44. Dispense: 30 tablet; Refill: 0    3. Iron deficiency anemia, unspecified iron deficiency anemia type  - Iron remains low  - Ferrous sulfate increased to BID  - Will check levels post-operatively    4. Pre-op evaluation  - All referrals sent  - Amb External Referral To Psychology  - Ambulatory referral to Cardiology  - Ambulatory referral to Pulmonology    5. History of total thyroidectomy  - Recent TSH 9.9  - Has appt with Dr. Tanisha Asif next week  - Will need medications adjusted    . This patient is a female of reproductive age and was advised she should not become pregnant during the bariatric workup process and for at least 12-18 months after surgery. The patient is agreeable to this plan. I spent 25 minutes with the patient face to face today and over 50% of the office visit today was spent in face to face counseling regarding diet and exercise, in preparation for her planned Robotic Sleeve Gastrectomy. Discussed in length complying with the dietary recommendations, complying with the preoperative workup including dietary counseling, exercise physiologist counseling, and pre-operative optimization of pulmonologist and cardiologist.    The patient expressed understanding and willingness to comply nicely; all questions and concerns addressed. Orders Placed This Encounter   Medications    ferrous sulfate (IRON 325) 325 (65 Fe) MG tablet     Sig: Take 1 tablet by mouth 3 times daily (with meals)     Dispense:  30 tablet     Refill:  5    phentermine (ADIPEX-P) 37.5 MG tablet     Sig: Take 1 tablet by mouth every morning (before breakfast) for 30 days. BMI 44.      Dispense:  30 tablet     Refill:  0     Orders Placed This Encounter   Procedures    Amb External Referral To Psychology     Referral Priority:   Routine     Referral Type:   Consult for Advice and Opinion     Referral Reason:   Specialty Services Required     Requested Specialty:   Psychology     Number of Visits Requested:   1    Ambulatory referral to Cardiology     Referral Priority:   Routine     Referral Type:   Consult for Advice and Opinion     Referred to Provider:   Latha Llamas MD     Number of Visits Requested:   1    Ambulatory referral to Pulmonology     Referral Priority:   Routine     Referral Type:   Consult for Advice and Opinion     Referral Reason:   Specialty Services Required     Referred to Provider:   Radha Cassidy MD     Number of Visits Requested:   1       Follow Up:  Return in about 1 month (around 6/12/2022).     Radha Jacobo, APRN - CNP

## 2022-05-13 ASSESSMENT — ENCOUNTER SYMPTOMS
ALLERGIC/IMMUNOLOGIC NEGATIVE: 1
EYES NEGATIVE: 1
RESPIRATORY NEGATIVE: 1
GASTROINTESTINAL NEGATIVE: 1

## 2022-06-07 ENCOUNTER — HOSPITAL ENCOUNTER (OUTPATIENT)
Dept: PHYSICAL THERAPY | Age: 32
Setting detail: THERAPIES SERIES
Discharge: HOME | End: 2022-06-07
Payer: COMMERCIAL

## 2022-06-07 PROCEDURE — 97161 PT EVAL LOW COMPLEX 20 MIN: CPT

## 2022-06-07 PROCEDURE — 97110 THERAPEUTIC EXERCISES: CPT

## 2022-06-07 NOTE — PROGRESS NOTES
Physical Therapy: Initial Evaluation    Patient: Leann Cho (57 y.o. female)   Examination Date:   Plan of Care Certification Period: 2022 to        :  1990 ;    Confirmed: Yes MRN: [id number]  CSN: [id number]   Insurance: Payor: Ham Lowry / Plan: Nila Bolden / Product Type: *No Product type* /   Insurance ID: [id number] - (Medicaid Managed) Secondary Insurance (if applicable):    Referring Physician: MD Dr. Mirian Chandler   PCP: Ralf Segundo MD Visits to Date/Visits Approved:   /      No Show/Cancelled Appts:   /       Medical Diagnosis: Morbid (severe) obesity due to excess calories [E66.01] Bariatric  Treatment Diagnosis: mod deconditioning     PERTINENT MEDICAL HISTORY   Patient Assessed for Rehabilitation Services: Yes  Self reported health status[de-identified] Fair    Medical History: Chart Reviewed: Yes   Past Medical History:   Diagnosis Date    Abdominal pain     Abnormal uterine bleeding (AUB)     Acute pelvic pain     Anemia     Depression     Foot ulcer, left ([facility] 2017    status post nerve decompression repair, excision of neoplasm, left foot, with muscle flap and graft skin application.     Graves disease Dx: 2019    Dr. Sergio Alvarado disease     History of blood transfusion     Hypermenorrhea     Irregular menses     Liver disease     Menorrhagia     Morbid obesity ([address]     Nexplanon removal     Thyroid disorder     UTI (lower urinary tract infection)     chronic UTI's with this pregnancy - last     UTI (urinary tract infection)      Surgical History:   Past Surgical History:   Procedure Laterality Date    ARM SURGERY Left 2021    LEFT ARM FOREIGN BODY REMOVAL performed by Rasheeda Hernandez MD at [address]      ,     CHOLECYSTECTOMY  2010    FOOT SURGERY Left 2017    lipoma removed   [address]      LAPAROSCOPY  13    diagnostic    THYROIDECTOMY Bilateral 3/3/2020    TOTAL THYROIDECTOMY performed by Michelle Mcknight MD at [address]  07/24/2012    Gastritis -Dr. Robert Hill       Medications:   Current Outpatient Medications:     ferrous sulfate (IRON 325) 325 (65 Fe) MG tablet, Take 1 tablet by mouth 3 times daily (with meals), Disp: 30 tablet, Rfl: 5    phentermine (ADIPEX-P) 37.5 MG tablet, Take 1 tablet by mouth every morning (before breakfast) for 30 days. BMI 44., Disp: 30 tablet, Rfl: 0    Prenatal Vit-Fe Fumarate-FA (PRENATAL VITAMINS) 28-0.8 MG TABS, Take 1 tablet by mouth daily (any prenatal vitamin covered), Disp: 30 tablet, Rfl: 11    levothyroxine (SYNTHROID) 175 MCG tablet, Take 1 tablet by mouth daily , Disp: , Rfl:   Allergies: Pcn [penicillins], Morphine, and Gabapentin      SUBJECTIVE EXAMINATION     History obtained from[de-identified] Patient,      Family/Caregiver Present: No    Subjective History:    Subjective: States new to the program with seeing dietician at this time. States having gained 150 pounds in 1.5 years. Reduced pop intake has helped at all. Doing well overall physically. States does having SOB with prolonged activities. Denies pain overall. Gym 2x a week pending schedule overall. States not having thyroid that has altered her metabolism. Additional Pertinent Hx (if applicable):      Any changes to allergies, medications, or have you had any medical procedures/ER visits since your last visit?: No      Learning/Language: Learning  Does the patient/guardian have any barriers to learning?: No barriers  Will there be a co-learner?: No  What is the preferred language of the patient/guardian?: English  Is an  required?: No  How does the patient/guardian prefer to learn new concepts?: Listening,Demonstration     Pain Screening   Pain Screening  Patient Currently in Pain: No    Functional Status    Dominant Hand: : Right    Social History:  Social History  Type of Home: [address]: One level  Home Access: Stairs to enter with rails  Entrance Stairs - Number of Steps: 3  Bathroom Shower/Tub: Tub/Shower unit  Bathroom Toilet: Standard    Occupation/Interests:  Occupation: Full time employment  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 Palpation:   Lumbar Spine Palpation: NA    Ambulation/Gait (if applicable): WNL    Balance Screen:  Balance  Sitting - Static: Good  Sitting - Dynamic: Good  Standing - Static: Good  Standing - Dynamic: Good  Tandem Stance R Leg: >30  Tandem Stance L Leg: >30  Single Stance R Le  Single Stance L Le  Comments: No increase in pain    Neuro Screen:  WNL  Left AROM  Right AROM      General AROM LE: Right WFL,Left WFL    General AROM LE: Right WFL,Left WFL        Left PROM  Right PROM       WNL with overpressure       WNL with overpressure         Left Strength  Right Strength      General Strength Testing LE: Right WNL,Left WNL (no pain with all testing.)    General Strength Testing LE: Right WNL,Left WNL (no pain with all testing.)          Lumbar Assessment   AROM Lumbar Spine   Lumbar Spine AROM : WNL  Lumbar spine general AROM: Full in all directions without increase in pain       Trunk Strength      good iso in all directions in sitting. Additional Finding(s) (if applicable): Other: 95 bpm         96% Sat O2                6MWT:1084ft without rest break.            116 bpm         97% Care and Goals: Yes  Patient/ Caregiver education and instruction: Yaneli Powers of Care,Evaluative findings             Treatment may include any combination of the following: Strengthening,Gait training (group)     Frequency / Duration:  Patient to be seen 1 for 2 weeks      Eval Complexity: Overall Evaluation : Low  Decision Making: Low Complexity  History: Personal Factors and/or Comorbidities Impacting POC: Low  Examination of body system(s) including body structures and functions, activity limitations, and/or participation restrictions: Low  Clinical Presentation: Low          Therapist Signature: Tavia Rabago PT, DPT, OCS     Date: 6/7/2022 6/8/5182 7:95 PM     I certify that the above Therapy Services are being furnished while the patient is under my care. I agree with the treatment plan and certify that this therapy is necessary. Physician's Signature:  ___________________________   Date:_______                                                                   Esau Paula MD        Physician Comments: _______________________________________________    Please sign and return to [address] [place] [address]. Please fax to the location listed below.  Vinayak Beverly for this referral!    [address] [facility] [address], [address] [address]  Dept: [phone]  Loc: [phone]

## 2022-06-07 NOTE — PLAN OF CARE
Outpatient Physical Therapy           [place]           [] Phone: [phone]   Fax: [phone]  [place]           [] Phone: [phone]   Fax: [phone]     To: Reggie Bell MD     From: Seb Ortiz, PT, DPT ,OCS      Patient: Bianca Bryson       : 1990  Diagnosis: Morbid (severe) obesity due to excess calories [E66.01] Diagnosis: Bariatric  Treatment Diagnosis: mod deconditioning  Date: 2022    Physical Therapy Certification/Re-Certification Form  Dear Dr. Lawayne Gottron  The following patient has been evaluated for physical therapy services and for therapy to continue, insurance requires physician review of the treatment plan initially and every 90 days. Please review the attached evaluation and/or summary of the patient's plan of care, and verify that you agree therapy should continue by signing the attached document and sending it back to our office. Assessment:      Pt is 27year old female currently enrolled in weight management program. Pt now has difficulties completing prolonged activities including standing and walking due to fatigue with SOB. Pt demo deficits this date that include deconditioning with continued activity. Pt will benefit with PT services with return for education on physical activity to safely increase activity to prevent injury and safe weight loss. Patient agrees with established plan of care and assisted in the development of their short term and long term goals. Patient had no adverse reaction with initial treatment and there are no barriers to learning.  Demonstrates no mental or cognitive disorder    Plan of Care/Treatment to date:  [x] Therapeutic Exercise  [] Modalities:  [] Therapeutic Activity     [] Ultrasound  [] Electrical Stimulation  [x] Gait Training      [] Cervical Traction [] Lumbar Traction  [] Neuromuscular Re-education    [] Cold/hotpack [] Iontophoresis   [] Instruction in HEP      [] Vasopneumatic    [] Dry Needling  [] Manual Therapy               [] Aquatic Therapy       Other:  Grouip        Frequency/Duration:  # Days per week: [x] 1 day # Weeks: [] 1 week [] 5 weeks     [] 2 days   [x] 2 weeks [] 6 weeks     [] 3 days   [] 3 weeks [] 7 weeks     [] 4 days   [] 4 weeks [] 8 weeks         [] 9 weeks [] 10 weeks         [] 11 weeks [] 12 weeks    Rehab Potential/Progress: [] Excellent [x] Good [] Fair  [] Poor     Goals:    Patient goals: lose weight. Short term goals  Time Frame for Short term goals: Defer to [address] Term Goals  Time Frame for Long Term Goals: 4 weeks   7/7/22  Pt will return for physical activity education class for safe independent exercise. Electronically signed by:  Mayela Hairston PT, DPT, OCS  6/7/2022, 3:25 PM    6/7/2022 3:25 PM         If you have any questions or concerns, please don't hesitate to call.   Thank you for your referral.      Physician Signature:________________________________Date:_________ TIME: _____  By signing above, therapists plan is approved by physician

## 2022-06-07 NOTE — FLOWSHEET NOTE
Outpatient Physical Therapy  [place]           [x] Phone: [phone]   Fax: [phone]  [place]           [] Phone: [phone]   Fax: [phone]        Physical Therapy Daily Treatment Note  Date:  2022    Patient Name:  John Beth    :  1990  MRN: [id number]  Restrictions/Precautions: No data recorded      Diagnosis:   Morbid (severe) obesity due to excess calories [E66.01] Diagnosis: Bariatric  Date of Injury/Surgery:   Treatment Diagnosis:  mod deconditioning  Insurance/Certification information: [facility] Pre-cert   Referring Physician:  MD Dr. Aimee Grande   PCP: Timoteo Rogers MD  Next Doctor Visit:    Plan of care signed (Y/N):  N, sent 22  Outcome Measure:   Visit# / total visits:  1 /2  Pain level: 0/10   Goals:     Patient goals: lose weight. Short term goals  Time Frame for Short term goals: Defer to [address] [facility] Term Goals  Time Frame for Long Term Goals: 4 weeks   22  Pt will return for physical activity education class for safe independent exercise. Summary of Evaluation:    Pt is 27year old female currently enrolled in weight management program. Pt now has difficulties completing prolonged activities including standing and walking due to fatigue with SOB. Pt demo deficits this date that include deconditioning with continued activity. Pt will benefit with PT services with return for education on physical activity to safely increase activity to prevent injury and safe weight loss. Patient agrees with established plan of care and assisted in the development of their short term and long term goals. Patient had no adverse reaction with initial treatment and there are no barriers to learning. Demonstrates no mental or cognitive disorder        Subjective:  See eval         Any changes in Ambulatory Summary Sheet?   None        Objective:  See eval   COVID screening questions were asked and patient attested that there had been no contact or symptoms        Exercises: (No more than 4 columns)   Exercise/Equipment 6/7/22  #1 Date Date           WARM UP                     TABLE                                       STANDING                                                     PROPRIOCEPTION                                    MODALITIES                      Other Therapeutic Activities/Education:  Patient received education on their current pathology and how their condition effects them with their functional activities. Patient understood discussion and questions were answered. Patient understands their activity limitations and understands rational for treatment progression. Educated on benefits to initiating walking program with monitoring of heart rate and perceived exertion to tolerance with appropriate rest and continue as able with goal to accumulate 30 min of activity. Pt reported understanding. Home Exercise Program:  HO issued, reviewed and discussed with patient. Pt agreed to comply.         Manual Treatments:        Modalities:        Communication with other providers:  POC sent 6/7/22       Assessment:  (Response towards treatment session) (Pain Rating)         Plan for Next Session: return for physical activity education class for safe independent exercise       Time In / Time Out:    6190-1124           Timed Code/Total Treatment Minutes:  10/45'  10' TE, 1 PT eval      Next Progress Note due:  Eval 6/7/22   Visit 10       Plan of Care Interventions:  [x] Therapeutic Exercise  [x] Modalities:  [x] Therapeutic Activity     [] Ultrasound  [] Estim  [] Gait Training      [] Cervical Traction [] Lumbar Traction  [x] Neuromuscular Re-education    [x] Cold/hotpack [] Iontophoresis   [x] Instruction in HEP      [x] Vasopneumatic   [] Dry Needling    [x] Manual Therapy               [] Aquatic Therapy              Electronically signed by:  Saturnino Philippe, PT, DPT, OCS  6/7/2022, 7:40 AM    6/7/2022 7:41 AM

## 2022-06-08 ENCOUNTER — HOSPITAL ENCOUNTER (OUTPATIENT)
Age: 32
Discharge: HOME | End: 2022-06-08
Payer: COMMERCIAL

## 2022-06-08 ENCOUNTER — HOSPITAL ENCOUNTER (OUTPATIENT)
Dept: GENERAL RADIOLOGY | Age: 32
Discharge: HOME | End: 2022-06-08
Payer: COMMERCIAL

## 2022-06-08 DIAGNOSIS — R06.02 SOB (SHORTNESS OF BREATH): ICD-10-CM

## 2022-06-08 PROCEDURE — 71046 X-RAY EXAM CHEST 2 VIEWS: CPT

## 2022-06-20 ENCOUNTER — OFFICE VISIT (OUTPATIENT)
Dept: BARIATRICS/WEIGHT MGMT | Age: 32
End: 2022-06-20
Payer: COMMERCIAL

## 2022-06-20 VITALS
HEART RATE: 86 BPM | HEIGHT: 64 IN | BODY MASS INDEX: 50.02 KG/M2 | OXYGEN SATURATION: 100 % | DIASTOLIC BLOOD PRESSURE: 86 MMHG | SYSTOLIC BLOOD PRESSURE: 112 MMHG | WEIGHT: 293 LBS

## 2022-06-20 DIAGNOSIS — Z01.818 PRE-OP EVALUATION: ICD-10-CM

## 2022-06-20 DIAGNOSIS — Z79.899 MEDICATION MANAGEMENT: ICD-10-CM

## 2022-06-20 DIAGNOSIS — E66.01 MORBID OBESITY WITH BMI OF 50.0-59.9, ADULT (HCC): Primary | ICD-10-CM

## 2022-06-20 DIAGNOSIS — D50.9 IRON DEFICIENCY ANEMIA, UNSPECIFIED IRON DEFICIENCY ANEMIA TYPE: ICD-10-CM

## 2022-06-20 PROCEDURE — G8417 CALC BMI ABV UP PARAM F/U: HCPCS | Performed by: NURSE PRACTITIONER

## 2022-06-20 PROCEDURE — 99213 OFFICE O/P EST LOW 20 MIN: CPT | Performed by: NURSE PRACTITIONER

## 2022-06-20 PROCEDURE — G8427 DOCREV CUR MEDS BY ELIG CLIN: HCPCS | Performed by: NURSE PRACTITIONER

## 2022-06-20 PROCEDURE — 1036F TOBACCO NON-USER: CPT | Performed by: NURSE PRACTITIONER

## 2022-06-20 RX ORDER — PHENTERMINE HYDROCHLORIDE 37.5 MG/1
37.5 TABLET ORAL
Qty: 30 TABLET | Refills: 0 | Status: SHIPPED | OUTPATIENT
Start: 2022-06-20 | End: 2022-07-20

## 2022-06-20 ASSESSMENT — ENCOUNTER SYMPTOMS
EYES NEGATIVE: 1
RESPIRATORY NEGATIVE: 1
ALLERGIC/IMMUNOLOGIC NEGATIVE: 1
GASTROINTESTINAL NEGATIVE: 1

## 2022-06-20 NOTE — PROGRESS NOTES
BARIATRIC SURGERY OFFICE PROGRESS NOTE    SUBJECTIVE:    Patient presenting today referred from Minda Noriega MD, for   Chief Complaint   Patient presents with    Weight Management     3rd surg wm    . Vitals:    06/20/22 1541   BP: 112/86   Pulse: 86   SpO2:         BMI: Body mass index is 52.28 kg/m². Weight History: Wt Readings from Last 3 Encounters:   06/20/22 (!) 304 lb 9.6 oz (138.2 kg)   06/13/22 (!) 310 lb (140.6 kg)   05/12/22 (!) 311 lb 4.8 oz (141.2 kg)         HPI:Juani Escamilla is a 27 y.o. female presenting in third bariatric PRE-OP visit    Diet Recall: Total weight loss/gain: -6.7 lbs since last visit, and -16.9 lbs since starting program     Protein: tracking calories; about 1400  Water Intake: at least 64 oz daily  Exercise: walks several times week   New health problems: Denies  New medications: Denies  Clearances:  -- Cardiology: appt 6/27  -- Pulmonology: needs CT scan- F/U on 7/25  -- Psychology: needs to call    Thoroughly reviewed the patient's medical history, family history, social history and review of systems with the patient today in the office. Please see medical record for pertinent positives. Past Medical History:   Diagnosis Date    Abdominal pain     Abnormal uterine bleeding (AUB)     Acute pelvic pain     Anemia 2010    Depression     Foot ulcer, left ([address] 2017    status post nerve decompression repair, excision of neoplasm, left foot, with muscle flap and graft skin application.     Graves disease Dx: 2/2019    Dr. Briana Benson disease     History of blood transfusion     Hypermenorrhea     Irregular menses     Liver disease     Menorrhagia     Morbid obesity ([address]     Nexplanon removal     Thyroid disorder     UTI (lower urinary tract infection)     chronic UTI's with this pregnancy - last 2014    UTI (urinary tract infection)         Patient Active Problem List   Diagnosis    Cyst of finger    Encounter for supervision of normal pregnancy in multigravida    Previous  delivery affecting pregnancy    Periumbilical abdominal pain    Melena    Rectal bleeding    Iron deficiency anemia    Morbid obesity with BMI of 50.0-59.9, adult (HCC)    Chronic GERD    Fatty liver    Chronic bilateral low back pain without sciatica    Neoplasm of uncertain behavior of skin of thigh    Graves disease    Foreign body in soft tissue       Past Surgical History:   Procedure Laterality Date    ARM SURGERY Left 2021    LEFT ARM FOREIGN BODY REMOVAL performed by Fuad Cortez MD at [address] [facility]      ,     CHOLECYSTECTOMY      FOOT SURGERY Left 2017    lipoma removed   [address]      LAPAROSCOPY  13    diagnostic    THYROIDECTOMY Bilateral 3/3/2020    TOTAL THYROIDECTOMY performed by Fuad Cortez MD at [address]  2012    Gastritis -Dr. Michelle Greene       Current Outpatient Medications   Medication Sig Dispense Refill    phentermine (ADIPEX-P) 37.5 MG tablet Take 1 tablet by mouth every morning (before breakfast) for 30 days. BMI 44. 30 tablet 0    ferrous sulfate (IRON 325) 325 (65 Fe) MG tablet Take 1 tablet by mouth 3 times daily (with meals) 30 tablet 5    Prenatal Vit-Fe Fumarate-FA (PRENATAL VITAMINS) 28-0.8 MG TABS Take 1 tablet by mouth daily (any prenatal vitamin covered) 30 tablet 11    levothyroxine (SYNTHROID) 175 MCG tablet Take 1 tablet by mouth daily        No current facility-administered medications for this visit. Allergies   Allergen Reactions    Pcn [Penicillins] Hives    Morphine Hives    Gabapentin Rash         Review of Systems   Constitutional: Negative. HENT: Negative. Eyes: Negative. Respiratory: Negative. Cardiovascular: Negative. Gastrointestinal: Negative. Endocrine: Negative. Genitourinary: Negative. Musculoskeletal: Negative. Skin: Negative.     Allergic/Immunologic: Negative. Neurological: Negative. Hematological: Negative. Psychiatric/Behavioral: Negative. OBJECTIVE:    /86   Pulse 86   Ht 5' 4\" (1.626 m)   Wt (!) 304 lb 9.6 oz (138.2 kg)   SpO2 100%   BMI 52.28 kg/m²      Physical Exam  Constitutional:       Appearance: Normal appearance. She is obese. HENT:      Head: Normocephalic. Nose: Nose normal.      Mouth/Throat:      Pharynx: Oropharynx is clear. Eyes:      Conjunctiva/sclera: Conjunctivae normal.      Pupils: Pupils are equal, round, and reactive to light. Cardiovascular:      Rate and Rhythm: Normal rate. Pulses: Normal pulses. Pulmonary:      Effort: Pulmonary effort is normal.      Breath sounds: Normal breath sounds. Abdominal:      General: Bowel sounds are normal.   Musculoskeletal:         General: Normal range of motion. Cervical back: Normal range of motion. Skin:     General: Skin is warm and dry. Capillary Refill: Capillary refill takes less than 2 seconds. Neurological:      General: No focal deficit present. Mental Status: She is alert and oriented to person, place, and time. Psychiatric:         Mood and Affect: Mood normal.         Behavior: Behavior normal.         ASSESSMENT & PLAN:    1. Morbid obesity with BMI of 50.0-59.9, adult [facility])  -Patient was encouraged to journal all food intake.   -Keep calorie level at approximately 1200, per discussion / plan with registered dietician.  -Protein intake is to be a minimum of 60 grams per day. -Water drinking was encouraged with a goal of 64oz-128oz daily. Beverages are to be calorie free except for milk. Avoid soda. -Continue to increase level of physical activity.  - Make another F/U appt with Dr. Erin Rivero to discuss thyroid level    2.  Medication management  - Doing well; down additional 6.7 pounds since last visit  - Denies any side effects  - Denies pregnancy  - Rx refill sent for last month of Adipex  - Wean last 4 days of Adipex therapy- take 1/2 pill each day  - No transition medication    3. Iron deficiency anemia, unspecified iron deficiency anemia type  - Continue supplements as prescribed  - Will monitor post-operatively    4. Pre-op evaluation  - Continue to work on clearances  - Will consent for EGD next visit depending on clearances   - RTC one month    This patient is a female of reproductive age and was advised she should not become pregnant during the bariatric workup process and for at least 12-18 months after surgery. The patient is agreeable to this plan. I spent 25 minutes with the patient face to face today and over 50% of the office visit today was spent in face to face counseling regarding diet and exercise, in preparation for her planned Robotic Sleeve Gastrectomy. Discussed in length complying with the dietary recommendations, complying with the preoperative workup including dietary counseling, exercise physiologist counseling, and pre-operative optimization of pulmonologist and cardiologist.    The patient expressed understanding and willingness to comply nicely; all questions and concerns addressed. Orders Placed This Encounter   Medications    phentermine (ADIPEX-P) 37.5 MG tablet     Sig: Take 1 tablet by mouth every morning (before breakfast) for 30 days. BMI 44. Dispense:  30 tablet     Refill:  0     No orders of the defined types were placed in this encounter. Follow Up:  Return in about 1 month (around 7/20/2022).     DADA Batres - CNP

## 2022-06-27 ENCOUNTER — INITIAL CONSULT (OUTPATIENT)
Dept: CARDIOLOGY CLINIC | Age: 32
End: 2022-06-27
Payer: COMMERCIAL

## 2022-06-27 VITALS
SYSTOLIC BLOOD PRESSURE: 118 MMHG | HEIGHT: 64 IN | HEART RATE: 74 BPM | WEIGHT: 293 LBS | BODY MASS INDEX: 50.02 KG/M2 | DIASTOLIC BLOOD PRESSURE: 72 MMHG

## 2022-06-27 DIAGNOSIS — Z01.818 PRE-OP TESTING: Primary | ICD-10-CM

## 2022-06-27 PROCEDURE — 93000 ELECTROCARDIOGRAM COMPLETE: CPT | Performed by: INTERNAL MEDICINE

## 2022-06-27 PROCEDURE — G8417 CALC BMI ABV UP PARAM F/U: HCPCS | Performed by: INTERNAL MEDICINE

## 2022-06-27 PROCEDURE — 99203 OFFICE O/P NEW LOW 30 MIN: CPT | Performed by: INTERNAL MEDICINE

## 2022-06-27 PROCEDURE — 1036F TOBACCO NON-USER: CPT | Performed by: INTERNAL MEDICINE

## 2022-06-27 PROCEDURE — G8428 CUR MEDS NOT DOCUMENT: HCPCS | Performed by: INTERNAL MEDICINE

## 2022-06-27 NOTE — PROGRESS NOTES
CARDIAC CONSULT NOTE       Juani  27 y.o.  female    Chief Complaint   Patient presents with    Cardiac Clearance     Surgery clearance for weight loss. SOBOE she states maybe 2 1/2 fligths of stairs, started about 2-3 months ago. PT states dizziness while walking, statrted about 1 week ago. Pt is having weight loss surgery     Shortness of Breath    Dizziness       Referring physician:  Neo Woodruff MD     Primary care physician:  Neo Woodruff MD    History of Present Illness:     [employer] is a 27 y.o. female referred for pre-op evaluation for weight loss surgery. C/O ROBLEDO & dizziness. No H/O Hypertension, DM, Dyslipidemia & known CAD. Non smoker. Reportedly her mother & grand parents had / has heart problems. .     has a past medical history of Abdominal pain, Abnormal uterine bleeding (AUB), Acute pelvic pain, Anemia, Depression, Foot ulcer, left (HCC), Graves disease, Graves disease, History of blood transfusion, Hypermenorrhea, Irregular menses, Liver disease, Menorrhagia, Morbid obesity ([address] Nexplanon removal, Thyroid disorder, UTI (lower urinary tract infection), and UTI (urinary tract infection). has a past surgical history that includes  section; laparoscopy (13); Upper gastrointestinal endoscopy (2012); Cholecystectomy (); hernia repair; hernia repair (); Thyroidectomy (Bilateral, 3/3/2020); Foot surgery (Left, ); and Arm Surgery (Left, 2021). reports that she quit smoking about 8 years ago. Her smoking use included cigarettes. She started smoking about 10 years ago. She has a 0.10 pack-year smoking history. She has never used smokeless tobacco. She reports that she does not drink alcohol and does not use drugs. family history includes Cancer in her maternal grandmother and paternal grandmother; Diabetes in her mother; Heart Disease in her mother; No Known Problems in her father;  Thyroid Disease in her 89 04/08/2022    TRIG 118 04/08/2022    HDL 31 (L) 04/08/2022    LDLCALC 98 04/08/2022     Lab Results   Component Value Date    ALT 29 04/08/2022    ALT 14 03/04/2020    AST 27 04/08/2022    AST 13 (L) 03/04/2020     BMP:    Lab Results   Component Value Date     04/08/2022     03/04/2020    K 4.2 04/08/2022    K 4.4 03/04/2020     04/08/2022     03/04/2020    CO2 25 04/08/2022    CO2 25 03/04/2020    BUN 9 04/08/2022    BUN 8 03/04/2020    CREATININE 0.7 04/08/2022    CREATININE 0.4 03/04/2020     CMP:    Lab Results   Component Value Date     04/08/2022     03/04/2020    K 4.2 04/08/2022    K 4.4 03/04/2020     04/08/2022     03/04/2020    CO2 25 04/08/2022    CO2 25 03/04/2020    BUN 9 04/08/2022    BUN 8 03/04/2020    CREATININE 0.7 04/08/2022    CREATININE 0.4 03/04/2020    PROT 7.5 04/08/2022    PROT 6.7 03/04/2020    PROT 8.2 07/11/2012    PROT 7.2 06/26/2012     TSH:    Lab Results   Component Value Date    TSHHS 9.910 04/08/2022    TSHHS <0.010 03/04/2020       Echo: not done  Stress Cardiolyte: not done  EKG: normal sinus rhythm, nonspecific ST and T waves changes       QUALITY MEASURES REVIEWED:  1.CAD:Patient is taking anti platelet agent:No  Patient does not have Hx of documented CAD  2. DYSLIPIDEMIA: Patient is on cholesterol lowering medication:No  3. Beta-Blocker therapy for CAD, if prior Myocardial Infarction:No   4. Counselled regarding smoking cessation. No   Patient does not Smoke. 5.Anticoagulation therapy (for A.Fib) No   Does Not have A.Fib.  6.Discussed weight management strategies. Assessment, Recommendations & Tests:     Pre-op evaluation:   Patient to go for weight loss surgery for Morbid Obesity. Deion Jordan female with No chest pain & low cardiac risk factor profile, does C/O ROBLEDO is referred for pre-op evaluation prior to weight loss surgery. EKG has non specific ST-T abnormalities. Has family H/O CAD.    Will check stress & Echo for risk stratification. I spent about 30 min. of time in review of the available data, chart Prep., interviewing patient, obtaining history, performing physical exam, going through decision making analysis for assessment & plans of management on this patient. Office Visit for test results.      Bre Dave MD, 6/27/2022 4:04 PM

## 2022-06-27 NOTE — LETTER
2022  3:45 PM    Patient Name: John Beth  : 1990  MRN# [id number]    REASON FOR VISIT: Aminah Zhong referral for cardiac clearance     Patient Active Problem List    Diagnosis Date Noted    Melena 2016    Rectal bleeding 2016    Iron deficiency anemia     Periumbilical abdominal pain 2016    Foreign body in soft tissue     Graves disease 2020    Neoplasm of uncertain behavior of skin of thigh 10/08/2018    Morbid obesity with BMI of 50.0-59.9, adult ([address] 2018    Chronic GERD 2018    Fatty liver 2018    Chronic bilateral low back pain without sciatica 2018    Encounter for supervision of normal pregnancy in multigravida 2014    Previous  delivery affecting pregnancy 2014    Cyst of finger 2013       Allergies: Pcn [penicillins], Morphine, and Gabapentin      Current Outpatient Medications   Medication Sig Dispense Refill    phentermine (ADIPEX-P) 37.5 MG tablet Take 1 tablet by mouth every morning (before breakfast) for 30 days. BMI 44. 30 tablet 0    ferrous sulfate (IRON 325) 325 (65 Fe) MG tablet Take 1 tablet by mouth 3 times daily (with meals) 30 tablet 5    Prenatal Vit-Fe Fumarate-FA (PRENATAL VITAMINS) 28-0.8 MG TABS Take 1 tablet by mouth daily (any prenatal vitamin covered) 30 tablet 11    levothyroxine (SYNTHROID) 175 MCG tablet Take 1 tablet by mouth daily        No current facility-administered medications for this visit.          Lab Review   Lab Results   Component Value Date    TROPONINT <0.010 2020    TROPONINT <0.010 2016     BNP:  No results found for: BNP, PROBNP  PT/INR:    Lab Results   Component Value Date    INR 1.08 2014     Lab Results   Component Value Date    LABA1C 6.6 (H) 2022     Lab Results   Component Value Date    WBC 13.5 (H) 2022    WBC 13.8 (H) 2020    HCT 41.0 2022    HCT 33.4 (L) 2020    MCV 79.2 2022 MCV 73.4 (L) 2020     2022     2020     Lab Results   Component Value Date    CHOL 153 2022    TRIG 118 2022    HDL 31 (L) 2022    LDLCALC 98 2022     Lab Results   Component Value Date    ALT 29 2022    ALT 14 2020    AST 27 2022    AST 13 (L) 2020     BMP:    Lab Results   Component Value Date     2022     2020    K 4.2 2022    K 4.4 2020     2022     2020    CO2 25 2022    CO2 25 2020    BUN 9 2022    BUN 8 2020    CREATININE 0.7 2022    CREATININE 0.4 2020     CMP:   Lab Results   Component Value Date     2022     2020    K 4.2 2022    K 4.4 2020     2022     2020    CO2 25 2022    CO2 25 2020    BUN 9 2022    BUN 8 2020    CREATININE 0.7 2022    CREATININE 0.4 2020    PROT 7.5 2022    PROT 6.7 2020    PROT 8.2 2012    PROT 7.2 2012     TSH:    Lab Results   Component Value Date    TSHHS 9.910 2022    TSHHS <0.010 2020       Smoke: What:                           How much:    Alcohol:                                      How Much:     Caffeine: Pop:         Tea:            Coffee:                Chocolate:    Exercise:    Last Visit:  Complaints:  Changes:    LAST EK2022    VENOUS DOPPLER: NONE    HOLTER/ EVENT MONITOR: NONE    STRESS TEST:  NONE    ECHO: NONE    CAROTID: NONE    MUGA: NONE    LAST PACER CHECK: NONE    CARDIAC CATH: NONE    Amio Protocol:    CHADS: [id number] Score for Atrial Fibrillation Stroke Risk   Risk   Factors  Component Value   C CHF No 0   H HTN No 0   A2 Age >= 76 No,  (27 y.o.) 0   D DM No 0   S2 Prior Stroke/TIA No 0   V Vascular Disease No 0   A Age 74-69 No,  (28 y.o.) 0   Sc Sex female 1    [id number]  Score  1   Score last updated 22 5:92 AM EDT    Click here for a link to the UpToDate guideline \"Atrial Fibrillation: Anticoagulation therapy to prevent embolization    Disclaimer: Risk Score calculation is dependent on accuracy of patient problem list and past encounter diagnosis.

## 2022-06-27 NOTE — LETTER
[address] [place] [address]. [address] [id number]  Phone: [phone]  Fax: [phone]    Ezequiel Mazariegos MD    June 27, 2022     Tatyana Quezada [address] [place] [id number]    Patient: Hillary Oviedo   MR Number: [id number]   YOB: 1990   Date of Visit: 6/27/2022       Dear Tatyana Quezada:    Thank you for referring Jerry Olson to me for evaluation/treatment. Below are the relevant portions of my assessment and plan of care. If you have questions, please do not hesitate to call me. I look forward to following Juani along with you.     Sincerely,      Ezequiel Mazariegos MD

## 2022-06-27 NOTE — PATIENT INSTRUCTIONS
Please be informed that if you contact our office outside of normal business hours the physician on call cannot help with any scheduling or rescheduling issues, procedure instruction questions or any type of medication issue. We advise you for any urgent/emergency that you go to the nearest emergency room! PLEASE CALL OUR OFFICE DURING NORMAL BUSINESS HOURS    Monday - Friday   8 am to 5 pm    [facility]: [phone]    [place]:  [phone]  **It is YOUR responsibilty to bring medication bottles and/or updated medication list to [address] [place] [address]. This will allow us to better serve you and all your healthcare needs**  [facility] Locations - No appointment necessary. Sites open Monday to Friday. Call your preferred location for test preparation, business hours and other information you need. [employer] accepts [employer]. [place] [facility] [address] [facility], [address] [facility]  Phone: [phone] [facility] [address] [facility]  [address]. [facility], [address] [facility]  Phone: [phone]     Pre-op evaluation:   Patient to go for weight loss surgery for Morbid Obesity. Ana Stark female with No chest pain & low cardiac risk factor profile, does C/O ROBLEDO is referred for pre-op evaluation prior to weight loss surgery. EKG has non specific ST-T abnormalities. Has family H/O CAD. Will check stress & Echo for risk stratification. I spent about 30 min. of time in review of the available data, chart Prep., interviewing patient, obtaining history, performing physical exam, going through decision making analysis for assessment & plans of management on this patient. Office Visit for test results.

## 2022-06-27 NOTE — PROGRESS NOTES
Vein \"LEG PROBLEM Questionnaire\"  1. Do you have prominent leg veins? No   2. Do you have any skin discoloration? No  3. Do you have any healed or active sores? No  4. Do you have swelling of the legs? No  5. Do you have a family history of varicose veins? No  6. Does your profession involve pro-longed        standing or heavy lifting? Yes  7. Have you been fighting overweight problems? Yes  8. Do you have restless legs? No  9. Do you have any night time cramps? No  10. Do you have any of the following in your legs:        Tiredness I  11. If Yes - Have they worn compression stockings No  12.  If they have worn compression stockings

## 2022-06-29 ENCOUNTER — HOSPITAL ENCOUNTER (OUTPATIENT)
Dept: PHYSICAL THERAPY | Age: 32
Setting detail: THERAPIES SERIES
Discharge: HOME | End: 2022-06-29
Payer: COMMERCIAL

## 2022-06-29 NOTE — FLOWSHEET NOTE
Physical Therapy  Cancellation/No-show Note  Patient Name:  John Beth  :  1990   Date:  2022  Cancelled visits to date: 0  No-shows to date: 1    For today's appointment patient:  []  Cancelled  []  Rescheduled appointment  []  No-show     Reason given by patient:  []  Patient ill  []  Conflicting appointment  []  No transportation    []  Conflict with work  []  No reason given  []  Other:     Comments:  Called and left VM to reschedule    Electronically signed by:  Zee Barron PT, DPT, CHRISTINE    2022 6:07 PM

## 2022-06-30 ENCOUNTER — HOSPITAL ENCOUNTER (EMERGENCY)
Age: 32
Discharge: HOME | End: 2022-06-30
Attending: STUDENT IN AN ORGANIZED HEALTH CARE EDUCATION/TRAINING PROGRAM
Payer: COMMERCIAL

## 2022-06-30 ENCOUNTER — TELEPHONE (OUTPATIENT)
Dept: CARDIOLOGY CLINIC | Age: 32
End: 2022-06-30

## 2022-06-30 VITALS
HEART RATE: 81 BPM | WEIGHT: 293 LBS | SYSTOLIC BLOOD PRESSURE: 120 MMHG | TEMPERATURE: 98.1 F | BODY MASS INDEX: 51.91 KG/M2 | OXYGEN SATURATION: 100 % | RESPIRATION RATE: 18 BRPM | DIASTOLIC BLOOD PRESSURE: 63 MMHG | HEIGHT: 63 IN

## 2022-06-30 DIAGNOSIS — R42 VERTIGO: Primary | ICD-10-CM

## 2022-06-30 LAB — GLUCOSE BLD-MCNC: 99 MG/DL (ref 70–99)

## 2022-06-30 PROCEDURE — 93005 ELECTROCARDIOGRAM TRACING: CPT | Performed by: STUDENT IN AN ORGANIZED HEALTH CARE EDUCATION/TRAINING PROGRAM

## 2022-06-30 PROCEDURE — 6370000000 HC RX 637 (ALT 250 FOR IP): Performed by: STUDENT IN AN ORGANIZED HEALTH CARE EDUCATION/TRAINING PROGRAM

## 2022-06-30 PROCEDURE — 99284 EMERGENCY DEPT VISIT MOD MDM: CPT

## 2022-06-30 PROCEDURE — 82962 GLUCOSE BLOOD TEST: CPT

## 2022-06-30 PROCEDURE — 99283 EMERGENCY DEPT VISIT LOW MDM: CPT

## 2022-06-30 RX ORDER — MECLIZINE HYDROCHLORIDE 25 MG/1
25 TABLET ORAL ONCE
Status: COMPLETED | OUTPATIENT
Start: 2022-06-30 | End: 2022-06-30

## 2022-06-30 RX ORDER — MECLIZINE HCL 12.5 MG/1
12.5 TABLET ORAL 3 TIMES DAILY PRN
Qty: 30 TABLET | Refills: 0 | Status: SHIPPED | OUTPATIENT
Start: 2022-06-30 | End: 2022-07-10

## 2022-06-30 RX ADMIN — MECLIZINE HYDROCHLORIDE 25 MG: 25 TABLET ORAL at 15:40

## 2022-06-30 NOTE — Clinical Note
Rommel Parks accompanied Alisa Bryant to the emergency department on 6/30/2022. They may return to work on 07/01/2022. If you have any questions or concerns, please don't hesitate to call.       Martha Gongora, DO

## 2022-06-30 NOTE — TELEPHONE ENCOUNTER
Patient states dizziness worse since 6/27 OV, very fatigued. B/P100/68. Discussed with Dr Marixa Jain, moved stress test to 7/1, unable to move echo due to patient's limited availability.  Patient advised to go to ED if she needs to

## 2022-06-30 NOTE — ED PROVIDER NOTES
EMERGENCY DEPARTMENT ENCOUNTER      PCP: Estefani Villanueva MD    CHIEF COMPLAINT    Chief Complaint   Patient presents with    Dizziness     x4 days           HPI    Jose Ramon Gramajo is a 27 y.o. female who presents with acute onset of dizziness today at work. Context is that it is provoked with going from the seated to standing position or moving her head too fast.  Only last for 2 to 3 minutes at a time. No diplopia. Mild nausea without vomiting. Denies headache. No chest pain or shortness of breath. She states she is actually had this intermittently for the past 3 months, however, feels that it is becoming more frequent in the last few days. She states that she works 2 jobs and does not have much time to eat or drink during this. Deniestinnitus, hearing loss, vomiting. No recent medication changes. She is on Adipex and is tolerated this for a while. She is following closely both with cardiology and pulmonology for preop clearance for bariatric surgery. Pt has not had recent URI sxs       REVIEW OF SYSTEMS   Constitutional:  Denies fever, chills, weight loss or weakness   Eyes:   Denies discharge, diplopia, blurred vision, or loss visual field  HENT:  Denies sore throat or ear pain. Denies hearing loss. Heart:  Denies chest pain  Lungs:  Denies shortness of breath  GI  Denies abdominal pain. :  Denies Dysuria or Hematuria. Adamantly denies pregnancy  Musculoskeletal:  Denies back pain. Skin:  Denies rash   Endocrine:  Denies polyuria or polydypsia   Lymphatic:  Denies swollen glands   Psychiatric:   Denies depression, suicidal ideation or homicidal ideation     Neurologic:  See HPI. Denies headache, confusion, or memory loss. Denies light-headedness,, or LOC. Denies stiff neck.   Denies weakness or sensory changes       All other review of systems negative at this time  See HPI and nursing notes for additional information      PAST MEDICAL & SURGICAL HISTORY    Past Medical History: Diagnosis Date    Abdominal pain     Abnormal uterine bleeding (AUB)     Acute pelvic pain     Anemia 2010    Depression     Foot ulcer, left ([address] 2017    status post nerve decompression repair, excision of neoplasm, left foot, with muscle flap and graft skin application.  Graves disease Dx: 2/2019    Dr. Maugi Terrazas disease     History of blood transfusion     Hypermenorrhea     Irregular menses     Liver disease     Menorrhagia     Morbid obesity ([address]     Nexplanon removal     Thyroid disorder     UTI (lower urinary tract infection)     chronic UTI's with this pregnancy - last 2014    UTI (urinary tract infection)      Past Surgical History:   Procedure Laterality Date    ARM SURGERY Left 8/31/2021    LEFT ARM FOREIGN BODY REMOVAL performed by Kolby Sierra MD at [facility]      2010, 2014    CHOLECYSTECTOMY  2010    FOOT SURGERY Left 2017    lipoma removed   [address]  2013    LAPAROSCOPY  12/21/13    diagnostic    THYROIDECTOMY Bilateral 3/3/2020    TOTAL THYROIDECTOMY performed by Kolby Sierra MD at [address]  07/24/2012    Gastritis -Dr. Zeeshan Myles    Current Outpatient Rx   Medication Sig Dispense Refill    meclizine (ANTIVERT) 12.5 MG tablet Take 1 tablet by mouth 3 times daily as needed for Dizziness or Nausea 30 tablet 0    phentermine (ADIPEX-P) 37.5 MG tablet Take 1 tablet by mouth every morning (before breakfast) for 30 days.  BMI 44. 30 tablet 0    ferrous sulfate (IRON 325) 325 (65 Fe) MG tablet Take 1 tablet by mouth 3 times daily (with meals) 30 tablet 5    Prenatal Vit-Fe Fumarate-FA (PRENATAL VITAMINS) 28-0.8 MG TABS Take 1 tablet by mouth daily (any prenatal vitamin covered) 30 tablet 11    levothyroxine (SYNTHROID) 175 MCG tablet Take 1 tablet by mouth daily          ALLERGIES    Allergies   Allergen Reactions    Pcn [Penicillins] Hives    Morphine Hives  Gabapentin Rash       SOCIAL & FAMILY HISTORY    Social History     Socioeconomic History    Marital status: Single     Spouse name: None    Number of children: None    Years of education: None    Highest education level: None   Occupational History    None   Tobacco Use    Smoking status: Former Smoker     Packs/day: 0.10     Years: 1.00     Pack years: 0.10     Types: Cigarettes     Start date:      Quit date: 2013     Years since quittin.5    Smokeless tobacco: Never Used   Vaping Use    Vaping Use: Never used   Substance and Sexual Activity    Alcohol use: No    Drug use: No     Types: Marijuana (Weed)     Comment: quit in     Sexual activity: Yes     Partners: Male   Other Topics Concern    None   Social History Narrative    None     Social Determinants of Health     Financial Resource Strain: Low Risk     Difficulty of Paying Living Expenses: Not hard at all   Food Insecurity: No Food Insecurity    Worried About [address] in the Last Year: Never true    [facility] [employer] in the Last Year: Never true   Transportation Needs:     Lack of Transportation (Medical): Not on file    Lack of Transportation (Non-Medical):  Not on file   Physical Activity:     Days of Exercise per Week: Not on file    Minutes of Exercise per Session: Not on file   Stress:     Feeling of Stress : Not on file   Social Connections:     Frequency of Communication with Friends and Family: Not on file    Frequency of Social Gatherings with Friends and Family: Not on file    Attends Rastafari Services: Not on file    Active Member of Clubs or Organizations: Not on file    Attends Club or Organization Meetings: Not on file    Marital Status: Not on file   Intimate Partner Violence:     Fear of Current or Ex-Partner: Not on file    Emotionally Abused: Not on file    Physically Abused: Not on file    Sexually Abused: Not on file   Housing Stability:     Unable to Pay for Housing in the Last Year: Not on file    Number of Places Lived in the Last Year: Not on file    Unstable Housing in the Last Year: Not on file     Family History   Problem Relation Age of Onset    Thyroid Disease Sister     Thyroid Disease Paternal Aunt     Cancer Maternal Grandmother     Cancer Paternal Grandmother     Diabetes Mother     Heart Disease Mother     No Known Problems Father        PHYSICAL EXAM    VITAL SIGNS: BP (!) 121/58   Pulse 91   Temp 98 °F (36.7 °C)   Resp 20   Ht 5' 3\" (1.6 m)   Wt (!) 304 lb (137.9 kg)   LMP 06/11/2022   SpO2 100%   BMI 53.85 kg/m²    Constitutional:  Well developed, well nourished, no acute distress     HENT:  Atraumatic.    hearing intact and equal bilaterally  Ear canals clear, TMs clear  mastoids without redness, warmth, swelling  Eyes: Conjunctiva clear. No tearing . Pupils equally round and react to light, extraocular movement are intact. There is fatiguable nystagmus  With rightward gaze. Neck: supple, no JVD. Cardiovascular:  Reg rate & rhythm, no murmurs/rubs/gallops. Respiratory:  Lungs Clear, no retractions   GI:  Soft, nontender, normal bowel sounds  Musculoskeletal:  No edema, no deformities  Integument:  Well hydrated, no rash, no petechiae   Psych: Pleasant affect, no hallucinations       Neurologic:    - Alert & oriented person, place, time, and situation, no speech difficulties or slurring.  - No obvious gross motor deficits  - Cranial nerves 2-12 grossly intact  - Sensation intact to light touch  - Strength 5/5 in upper and lower extremities bilaterally  - Normal finger to nose test bilaterally  - Rapid alternating movements intact  - Normal heel-shin bilaterally  - No pronator drift. - Light touch sensation intact throughout.   - Upper and lower extremity DTRs 2+ bilaterally.  -No truncal ataxia  - Gait steady and without difficulty  -Negative test of skew ----------------------------------------------------------------------------------------------------------------------      NIH Stroke Scale  (Total score at bottom)      (1A) LOC  (Alert?):  0 - alert; keenly responsive    (1B) LOC Questions (Month / Age):  0 - answers both questions correctly     (1C) LOC Command  (Close eyes, squeeze my hands):  0 - performs both tasks correctly    (2) Best Gaze  (Eyes open-patient follows finger or face):  0 - normal    (3) Visual  (Introduce visual stimulus to patient's visual field quadrants):  0 - no visual loss    (4)  Facial palsy  (Show teeth, raise eyebrows and squeeze eyes shut):  0 - normal symmetric movement    (5) Motor arms  (Elevate extremity to 90° in sitting or 45° if supine -  score drift/movement)       (5A)  motor arm LEFT :  0 - no drift, limb holds 90 (or 45) degrees for full 10 seconds       (5B) motor arm RIGHT:   0 - no drift, limb holds 90 (or 45) degrees for full 10 seconds      (6) Motor legs  (Elevate extremity to 30° while supine and score drift/movement)       (6A)  motor leg LEFT:   0 - no drift; leg holds 30 degree position for full 5 seconds       (6B) motor leg RIGHT:  0 - no drift; leg holds 30 degree position for full 5 seconds      (7)  Limb Ataxia  (Finger-nose, heel-shin):  0 - absent    (8) Sensory  (face, arms trunk, and legs-compare side to side):  0 - normal; no sensory loss    (9)  Best language  (Name items, describes a picture, read sentence-see attached testing cards):  0 - no aphasia, normal    (10)  Dysarthria  (Evaluate speech clarity by patient repeating listed words):  0 - normal    (11)  Extinction and inattention  (can feel \"left, right, or both sides\" of face, arms, legs w/ closed eyes) (can see moving index finger in \"left, right, or both\":  0 - no abnormality        Total NIHSS:  0      ----------------------------------------------------------------------------------------------------------------------      ED [facility]        Patient presents as above with intermittent dizziness described as room-spinning sensation lasting 2-3 minutes at a time. Pt is afebrile, nontoxic appearing, well hydrated and vital signs are stable     Based on Pt's HPI, ROS, physical exam and ED workup today, I also estimate there is LOW risk for bacterial meningitis, subarachnoid hemorrhage, or cardiopulmonary cause of pt's symptoms. No emergent MRI or hospitalization for dizziness indicated        I completed a structured, evidence-based clinical evaluation to screen for acute stroke and neurologic deficits in this patient. The patient has a normal detailed neurologic exam (including negative test of skew) & NIHSS of 0 ,  which is highly sensitive for dangerous causes of dizziness, vertigo, or loss of balance. The evidence indicates that the patient is very low risk for an acute neurologic emergency and this is consistent with my clinical intuition. The risk of further workup or hospitalization is likely higher than the risk of the patient having a stroke or other dangerous neurologic condition. It is, therefore, in the patients best interest not to do additional emergent testing or to be hospitalized at this time. I have discussed with the patient my clinical impression and the result of an evidence-based clinical evaluation to screen for stroke, as well as the risk of further testing and hospitalization. The evidence shows that the risk for stroke is less than 1%. Although the risk of stroke has not been completely eliminated, the risks of further testing or hospitalization likely exceed any potential benefit, and at this time the patient elects to hold on any further emergent evaluation or hospitalization for stroke evaluation at this time. Additionally, we discussed the probability of a peripheral cause of dizziness, possibly BPPV.      I discussed the etiology of BPPV with Pt at bedside and provided instructions for exercises      Clinical  IMPRESSION    1. Vertigo        Comment: Please note this report has been produced using speech recognition software and may contain errors related to that system including errors in grammar, punctuation, and spelling, as well as words and phrases that may be inappropriate. If there are any questions or concerns please feel free to contact the dictating provider for clarification.              Sherry Cabrera, DO  06/30/22 1527

## 2022-06-30 NOTE — Clinical Note
Rachael Grace was seen and treated in our emergency department on 6/30/2022. She may return to work on 07/03/2022. If you have any questions or concerns, please don't hesitate to call.       Romi Ha, DO

## 2022-06-30 NOTE — ED NOTES
Patient reports she has experienced dizziness for the past couple of months but it has been getting progressively worse the past 5 days. She states she has been seeing her cardiac doctor for weight loss program and was told to come here for further evaluation. She also reports blurry vision. Patient resting comfortably in bed and denies needs at this time.       Beth Pablo RN  06/30/22 8267

## 2022-06-30 NOTE — ED PROVIDER NOTES
12 lead EKG per my interpretation:  Normal Sinus Rhythm 70  Axis is   Normal  QTc is  442  There is no specific T wave changes appreciated. There is no specific ST wave changes appreciated.     Prior EKG to compare with was not available         Eric Hayes DO  06/30/22 3189

## 2022-06-30 NOTE — ED NOTES
Blood sugar 99. EKG done at this time and given to Dr. Nir Colin.       Rupinder Murphy RN  06/30/22 7681

## 2022-07-02 LAB
EKG ATRIAL RATE: 70 BPM
EKG DIAGNOSIS: NORMAL
EKG P AXIS: 65 DEGREES
EKG P-R INTERVAL: 146 MS
EKG Q-T INTERVAL: 410 MS
EKG QRS DURATION: 92 MS
EKG QTC CALCULATION (BAZETT): 442 MS
EKG R AXIS: -9 DEGREES
EKG T AXIS: 20 DEGREES
EKG VENTRICULAR RATE: 70 BPM

## 2022-07-02 PROCEDURE — 93010 ELECTROCARDIOGRAM REPORT: CPT | Performed by: INTERNAL MEDICINE

## 2022-07-18 ENCOUNTER — HOSPITAL ENCOUNTER (OUTPATIENT)
Dept: PULMONOLOGY | Age: 32
Discharge: HOME | End: 2022-07-18
Payer: COMMERCIAL

## 2022-07-18 ENCOUNTER — HOSPITAL ENCOUNTER (OUTPATIENT)
Dept: CT IMAGING | Age: 32
Discharge: HOME | End: 2022-07-18
Payer: COMMERCIAL

## 2022-07-18 DIAGNOSIS — R91.8 LUNG INFILTRATE: ICD-10-CM

## 2022-07-18 PROCEDURE — 94726 PLETHYSMOGRAPHY LUNG VOLUMES: CPT

## 2022-07-18 PROCEDURE — 94729 DIFFUSING CAPACITY: CPT

## 2022-07-18 PROCEDURE — 71250 CT THORAX DX C-: CPT

## 2022-07-18 PROCEDURE — 94060 EVALUATION OF WHEEZING: CPT

## 2022-07-25 ENCOUNTER — PROCEDURE VISIT (OUTPATIENT)
Dept: CARDIOLOGY CLINIC | Age: 32
End: 2022-07-25
Payer: COMMERCIAL

## 2022-07-25 DIAGNOSIS — Z01.818 PRE-OP TESTING: ICD-10-CM

## 2022-07-25 PROCEDURE — 93015 CV STRESS TEST SUPVJ I&R: CPT | Performed by: INTERNAL MEDICINE

## 2022-07-27 ENCOUNTER — OFFICE VISIT (OUTPATIENT)
Dept: BARIATRICS/WEIGHT MGMT | Age: 32
End: 2022-07-27
Payer: COMMERCIAL

## 2022-07-27 ENCOUNTER — HOSPITAL ENCOUNTER (OUTPATIENT)
Dept: PHYSICAL THERAPY | Age: 32
Setting detail: THERAPIES SERIES
Discharge: HOME | End: 2022-07-27
Payer: COMMERCIAL

## 2022-07-27 ENCOUNTER — TELEPHONE (OUTPATIENT)
Dept: BARIATRICS/WEIGHT MGMT | Age: 32
End: 2022-07-27

## 2022-07-27 VITALS
SYSTOLIC BLOOD PRESSURE: 120 MMHG | HEIGHT: 64 IN | HEART RATE: 60 BPM | WEIGHT: 293 LBS | OXYGEN SATURATION: 99 % | DIASTOLIC BLOOD PRESSURE: 72 MMHG | BODY MASS INDEX: 50.02 KG/M2

## 2022-07-27 DIAGNOSIS — E66.01 MORBID OBESITY WITH BMI OF 50.0-59.9, ADULT (HCC): Primary | ICD-10-CM

## 2022-07-27 DIAGNOSIS — D50.9 IRON DEFICIENCY ANEMIA, UNSPECIFIED IRON DEFICIENCY ANEMIA TYPE: ICD-10-CM

## 2022-07-27 DIAGNOSIS — Z01.818 PRE-OP EVALUATION: ICD-10-CM

## 2022-07-27 PROCEDURE — G8417 CALC BMI ABV UP PARAM F/U: HCPCS | Performed by: NURSE PRACTITIONER

## 2022-07-27 PROCEDURE — 99213 OFFICE O/P EST LOW 20 MIN: CPT | Performed by: NURSE PRACTITIONER

## 2022-07-27 PROCEDURE — 97150 GROUP THERAPEUTIC PROCEDURES: CPT

## 2022-07-27 PROCEDURE — 1036F TOBACCO NON-USER: CPT | Performed by: NURSE PRACTITIONER

## 2022-07-27 PROCEDURE — G8427 DOCREV CUR MEDS BY ELIG CLIN: HCPCS | Performed by: NURSE PRACTITIONER

## 2022-07-27 ASSESSMENT — PATIENT HEALTH QUESTIONNAIRE - PHQ9
SUM OF ALL RESPONSES TO PHQ QUESTIONS 1-9: 0
SUM OF ALL RESPONSES TO PHQ QUESTIONS 1-9: 0
2. FEELING DOWN, DEPRESSED OR HOPELESS: 0
SUM OF ALL RESPONSES TO PHQ QUESTIONS 1-9: 0
SUM OF ALL RESPONSES TO PHQ QUESTIONS 1-9: 0
SUM OF ALL RESPONSES TO PHQ9 QUESTIONS 1 & 2: 0
1. LITTLE INTEREST OR PLEASURE IN DOING THINGS: 0

## 2022-07-27 ASSESSMENT — ENCOUNTER SYMPTOMS
EYES NEGATIVE: 1
GASTROINTESTINAL NEGATIVE: 1
ALLERGIC/IMMUNOLOGIC NEGATIVE: 1
COUGH: 1

## 2022-07-27 NOTE — PROGRESS NOTES
BARIATRIC SURGERY OFFICE PROGRESS NOTE    SUBJECTIVE:    Patient presenting today referred from Jacquelyn Kirby MD, for   Chief Complaint   Patient presents with    Weight Management     4th surg wm visit. .    Vitals:    07/27/22 1354   BP: 120/72   Pulse: 60   SpO2: 99%        BMI: Body mass index is 52.42 kg/m². Weight History: Wt Readings from Last 3 Encounters:   07/27/22 (!) 305 lb (138.3 kg)   07/27/22 (!) 305 lb 6.4 oz (138.5 kg)   06/30/22 (!) 304 lb (137.9 kg)         HPI:Juani Luciano is a 27 y.o. female presenting in fourth bariatric PRE-OP visit    Diet Recall: Total weight loss/gain: +0.8 lbs since last visit, and -16.1 lbs since starting program     Protein: tracking calories; about 1200 daily  Water Intake: drinks mostly water  Exercise: goes to gym several times a week  New health problems: Possible lung infection  New medications: Denies  Clearances:  -- Cardiology: stress test done/ having echo done/ has F/U appt  -- Pulmonology: has F/u appt   -- Psychology: will call Beatrice Pham to make appt     -- Updated labs from outside facility: TSH 4.0, T3 2.63, T4 1.27    Thoroughly reviewed the patient's medical history, family history, social history and review of systems with the patient today in the office. Please see medical record for pertinent positives. Past Medical History:   Diagnosis Date    Abdominal pain     Abnormal uterine bleeding (AUB)     Acute pelvic pain     Anemia 2010    Depression     Foot ulcer, left ([address] 2017    status post nerve decompression repair, excision of neoplasm, left foot, with muscle flap and graft skin application.     Graves disease Dx: 2/2019    Dr. Grace Madrid disease     History of blood transfusion     Hypermenorrhea     Irregular menses     Liver disease     Menorrhagia     Mild persistent asthma without complication 4/09/4376    Morbid obesity ([address]     Nexplanon removal     Thyroid disorder     UTI (lower urinary tract infection) chronic UTI's with this pregnancy - last     UTI (urinary tract infection)         Patient Active Problem List   Diagnosis    Cyst of finger    Encounter for supervision of normal pregnancy in multigravida    Previous  delivery affecting pregnancy    Periumbilical abdominal pain    Melena    Rectal bleeding    Iron deficiency anemia    Morbid obesity with BMI of 50.0-59.9, adult ([address]    Chronic GERD    Fatty liver    Chronic bilateral low back pain without sciatica    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      ,     CHOLECYSTECTOMY      FOOT SURGERY Left 2017    lipoma removed    [address]      LAPAROSCOPY  13    diagnostic    THYROIDECTOMY Bilateral 3/3/2020    TOTAL THYROIDECTOMY performed by Frances Glasgow MD at [address]  2012    Gastritis -Dr. Eugenio Lee       Current Outpatient Medications   Medication Sig Dispense Refill    ferrous sulfate (IRON 325) 325 (65 Fe) MG tablet Take 1 tablet by mouth 3 times daily (with meals) 30 tablet 5    levothyroxine (SYNTHROID) 175 MCG tablet Take 1 tablet by mouth daily       albuterol sulfate HFA (VENTOLIN HFA) 108 (90 Base) MCG/ACT inhaler Inhale 2 puffs into the lungs 4 times daily as needed for Wheezing 54 g 1    levoFLOXacin (LEVAQUIN) 500 MG tablet Take 1 tablet by mouth in the morning for 7 days. 10 tablet 0    Prenatal Vit-Fe Fumarate-FA (PRENATAL VITAMINS) 28-0.8 MG TABS Take 1 tablet by mouth daily (any prenatal vitamin covered) 30 tablet 11     No current facility-administered medications for this visit.         Allergies   Allergen Reactions    Pcn [Penicillins] Hives    Morphine Hives Gabapentin Rash         Review of Systems   Constitutional: Negative. HENT: Negative. Eyes: Negative. Respiratory:  Positive for cough. Cardiovascular: Negative. Gastrointestinal: Negative. Endocrine: Negative. Genitourinary: Negative. Musculoskeletal: Negative. Skin: Negative. Allergic/Immunologic: Negative. Neurological: Negative. Hematological: Negative. Psychiatric/Behavioral: Negative. OBJECTIVE:    /72 (Site: Left Upper Arm, Position: Sitting, Cuff Size: Large Adult)   Pulse 60   Ht 5' 4\" (1.626 m)   Wt (!) 305 lb 6.4 oz (138.5 kg)   SpO2 99%   BMI 52.42 kg/m²      Physical Exam  Constitutional:       Appearance: Normal appearance. She is obese. HENT:      Head: Normocephalic. Nose: Nose normal.      Mouth/Throat:      Pharynx: Oropharynx is clear. Eyes:      Conjunctiva/sclera: Conjunctivae normal.      Pupils: Pupils are equal, round, and reactive to light. Cardiovascular:      Rate and Rhythm: Normal rate. Pulses: Normal pulses. Pulmonary:      Effort: Pulmonary effort is normal.   Musculoskeletal:         General: Normal range of motion. Cervical back: Normal range of motion. Skin:     General: Skin is warm and dry. Capillary Refill: Capillary refill takes less than 2 seconds. Neurological:      General: No focal deficit present. Mental Status: She is alert and oriented to person, place, and time. Psychiatric:         Mood and Affect: Mood normal.         Behavior: Behavior normal.       ASSESSMENT & PLAN:    1. Morbid obesity with BMI of 50.0-59.9, adult [facility])  -Patient was encouraged to journal all food intake.   -Keep calorie level at approximately 1200, per discussion / plan with registered dietician.  -Protein intake is to be a minimum of 60 grams per day. -Water drinking was encouraged with a goal of 64oz-128oz daily. Beverages are to be calorie free except for milk. Avoid soda.    -Continue to increase level of physical activity.    - Obtain recent TSH result  from endocrinologist    - Adipex therapy completed without any side effects  - Weight loss thus far is 16 pounds    2. Iron deficiency anemia, unspecified iron deficiency anemia type  - Continue supplements as prescribed    3. Pre-op evaluation  - Continue working on clearances  - Will need to call Dr. Virgilio Tucker office for psych appt  - Consented for EGD today  - UDS and nicotine testing next visit  - RTC one month    This patient is a female of reproductive age and was advised she should not become pregnant during the bariatric workup process and for at least 12-18 months after surgery. The patient is agreeable to this plan. I spent 25 minutes with the patient face to face today and over 50% of the office visit today was spent in face to face counseling regarding diet and exercise, in preparation for her planned Robotic Sleeve Gastrectomy. Discussed in length complying with the dietary recommendations, complying with the preoperative workup including dietary counseling, exercise physiologist counseling, and pre-operative optimization of pulmonologist and cardiologist.    The patient expressed understanding and willingness to comply nicely; all questions and concerns addressed. No orders of the defined types were placed in this encounter. No orders of the defined types were placed in this encounter. Follow Up:  Return in about 1 month (around 8/27/2022).     DADA Andrade - CNP

## 2022-07-27 NOTE — DISCHARGE SUMMARY
Outpatient Physical Therapy                                                                    [place]           [x] Phone: [phone]   Fax: [phone]  [place]           [] Phone: [phone]   Fax: [phone]          Physical Therapy Daily Treatment Note  Date:  2022    Patient Name:  Babak Ly    :  1990  MRN: [id number]  Restrictions/Precautions:    Pain level: 0/10     Juani Natalie Mar  was seen today for the physical activity education presentation. Patient was screened for Covid symptoms and their temperature was taken. Patient does not have symptoms warranting delaying services. Will proceed with today's session. The purpose of the group exercises presentation was to present information to the individual for fitness and wellness. Part one of the presentation we spoke about the reasons why exercises and wellness is beneficial. We presented data and examples of different symptoms in the body improve as well improvement with sleep, anxiety, depression weight loss and improve in lung function. Part 2- Setting goals was described. SMART goals and formation of short term and long term goals to help be successful. Patient was referred back to their packet for example worksheet. Part 3- Examples of how to get started. Planning your exercises routine to be successful. Remember to be start slow and gradually increase in the intensity at home. Drinking plenty of fluid and always perform a warm up or cool down. Part 4- Exercises Basics - demonstrated the importance of a 5-10 min warm up with light stretches. Cool down to allow the body to return to base line measure. They demonstrated how to take their heart rate and why it is important to know the target range zone for fitness. Explained the Do's and Don'ts and the PRICE principal if injured. Explained they are to seek medical advice if severe pain, numbness, swelling or radicular symptoms occur.     Patient was guided through an exercises routine with a 5-minute walking warm up with light stretches for the calf muscles, upper back, shoulder. Patient was educated and demonstrated a simple circuit routine involving, dumbbell curls, marches, sit to stand, lateral dumbbell raises, step ups, lateral side steps, sled pull and wall taps. Patient received an explanation on how to conduct such routine at home and modifications to exercises. Patient did not actually go through such circuit secondary to Covid and class size. Patient did not have any adverse reactions after the group therapy and reported they felt fine no issues. Patient left with a exercises information packet and explained to call if there are any additional questions. Patient will be discharged from therapy services after today.      Billed 1 group charge     Time: 2128-0759    Conner Doll PT, DPT, [facility]    7/27/2022 8:35 AM

## 2022-07-28 ENCOUNTER — TELEPHONE (OUTPATIENT)
Dept: BARIATRICS/WEIGHT MGMT | Age: 32
End: 2022-07-28

## 2022-07-28 NOTE — TELEPHONE ENCOUNTER
LEFT MESSAGE FOR Juani Estrada REGARDING SURGERY (EGD W/ BX) SCHEDULED @ [facility].  NOTIFIED OF DATES, TIMES AND LOCATION    PHONE ASSESSMENT   SURGERY - 8/12/22 @ 095  P/O - MONTHLY VISIT 8/23/22 @ 145    NPO AFTER MIDNIGHT  HOLD BLOOD THINNERS - NA

## 2022-08-03 ENCOUNTER — PROCEDURE VISIT (OUTPATIENT)
Dept: CARDIOLOGY CLINIC | Age: 32
End: 2022-08-03
Payer: COMMERCIAL

## 2022-08-03 DIAGNOSIS — Z01.818 PRE-OP TESTING: ICD-10-CM

## 2022-08-03 LAB
LV EF: 58 %
LVEF MODALITY: NORMAL

## 2022-08-03 PROCEDURE — 93306 TTE W/DOPPLER COMPLETE: CPT | Performed by: INTERNAL MEDICINE

## 2022-08-05 ENCOUNTER — HOSPITAL ENCOUNTER (OUTPATIENT)
Dept: CT IMAGING | Age: 32
Discharge: HOME | End: 2022-08-05
Payer: COMMERCIAL

## 2022-08-05 DIAGNOSIS — R91.8 LUNG INFILTRATE: ICD-10-CM

## 2022-08-05 PROCEDURE — 71250 CT THORAX DX C-: CPT

## 2022-08-08 ENCOUNTER — TELEPHONE (OUTPATIENT)
Dept: CARDIOLOGY CLINIC | Age: 32
End: 2022-08-08

## 2022-08-08 NOTE — TELEPHONE ENCOUNTER
Echo:8/3/2022  Left ventricular function and size is normal, EF is estimated at 55-60%. Mild left ventricular hypertrophy. Grade I diastolic dysfunction. No regional wall motion abnormalities were detected. Mild pulmonic and tricuspid regurgitation present. RVSP is 30 mmHg.    No evidence of pericardial effusion    Left message

## 2022-08-09 NOTE — PROGRESS NOTES
Patient will be called with an arrival time on 8/11/2022 for her procedure at [facility] on 8/12/2022. 1. Do not eat or drink anything after midnight - unless instructed by your doctor prior to surgery. This includes                   no water, chewing gum or mints. 2. Follow your directions as prescribed by the doctor for your procedure and medications. 3. Check with your Doctor regarding stopping vitamins, supplements, blood thinners (Plavix, Coumadin, Lovenox, Effient, Pradaxa, Xarelto, Fragmin or                   other blood thinners) and follow their instructions. 4. Do not smoke, vape or use chewing tobacco morning of surgery. Do not drink any alcoholic beverages 24 hours prior to surgery. This includes NA Beer. No street drugs 7 days prior to surgery. 5. You may brush your teeth and gargle the morning of surgery. DO NOT SWALLOW WATER   6. You MUST make arrangements for a responsible adult to take you home after your surgery and be able to check on you every couple                   hours for the day. You will not be allowed to leave alone or drive yourself home. It is strongly suggested someone stay with you the first 24                   hrs. Your surgery will be cancelled if you do not have a ride home. 7. Please wear simple, loose fitting clothing to the hospital.  Andria Man not bring valuables (money, credit cards, checkbooks, etc.) Do not wear any                   makeup (including no eye makeup) or nail polish on your fingers or toes. 8. DO NOT wear any jewelry or piercings on day of surgery. All body piercing jewelry must be removed. 9. If you have dentures, they will be removed before going to the OR; we will provide you a container. If you wear contact lenses or glasses,                  they will be removed; please bring a case for them.            10. If you  have a Living Will and Durable Power of  for Healthcare, please bring in a copy. 11. Please bring picture ID,  insurance card, paperwork from the doctors office    (H & P, Consent, & card for implantable devices). 12. Take a shower the morning of your procedure with Hibiclens or an anti-bacterial soap. Do not apply any make-up, deodorant, lotion, oil or powder. 13.  Enter thru the main entrance wearing a mask on the day of surgery. Patient will take her synthroid the morning of her procedure, she voiced understanding concerning her EGD instructions and had no questions at this time.

## 2022-08-11 ENCOUNTER — ANESTHESIA EVENT (OUTPATIENT)
Dept: ENDOSCOPY | Age: 32
End: 2022-08-11
Payer: COMMERCIAL

## 2022-08-11 NOTE — ANESTHESIA PRE PROCEDURE
Department of Anesthesiology  Preprocedure Note       Name:  Lucie Sánchez   Age:  27 y.o.  :  1990                                          MRN:  [id number]         Date:  2022      Surgeon: Jeri Orozco):  Khushi Rivera MD    Procedure: Procedure(s):  EGD ESOPHAGOGASTRODUODENOSCOPY    Medications prior to admission:   Prior to Admission medications    Medication Sig Start Date End Date Taking? Authorizing Provider   MECLIZINE HCL PO Take by mouth 3 times daily as needed    Historical Provider, MD   albuterol sulfate HFA (VENTOLIN HFA) 108 (90 Base) MCG/ACT inhaler Inhale 2 puffs into the lungs 4 times daily as needed for Wheezing 22   Lizeth Walker MD   ferrous sulfate (IRON 325) 325 (65 Fe) MG tablet Take 1 tablet by mouth 3 times daily (with meals) 22   DADA Bowers - CNP   Prenatal Vit-Fe Fumarate-FA (PRENATAL VITAMINS) 28-0.8 MG TABS Take 1 tablet by mouth daily (any prenatal vitamin covered) 1/10/22 4/13/22  Yuri Rodríguez MD   levothyroxine (SYNTHROID) 175 MCG tablet Take 1 tablet by mouth daily  3/9/20   Historical Provider, MD       Current medications:    Current Outpatient Medications   Medication Sig Dispense Refill    MECLIZINE HCL PO Take by mouth 3 times daily as needed      albuterol sulfate HFA (VENTOLIN HFA) 108 (90 Base) MCG/ACT inhaler Inhale 2 puffs into the lungs 4 times daily as needed for Wheezing 54 g 1    ferrous sulfate (IRON 325) 325 (65 Fe) MG tablet Take 1 tablet by mouth 3 times daily (with meals) 30 tablet 5    Prenatal Vit-Fe Fumarate-FA (PRENATAL VITAMINS) 28-0.8 MG TABS Take 1 tablet by mouth daily (any prenatal vitamin covered) 30 tablet 11    levothyroxine (SYNTHROID) 175 MCG tablet Take 1 tablet by mouth daily        No current facility-administered medications for this visit. Allergies:     Allergies   Allergen Reactions    Pcn [Penicillins] Hives    Morphine Hives    Gabapentin Rash       Problem List:    Patient Active Problem List   Diagnosis Code    Cyst of finger [id number]    Encounter for supervision of normal pregnancy in multigravida Z34.80    Previous  delivery affecting pregnancy U71.609    Periumbilical abdominal pain R10.33    Melena K92.1    Rectal bleeding K62.5    Iron deficiency anemia D50.9    Morbid obesity with BMI of 50.0-59.9, adult ([facility] [address] E66.01, Z68.43    Chronic GERD K21.9    Fatty liver K76.0    Chronic bilateral low back pain without sciatica M54.50, G89.29    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     Graves disease Dx: 2019    Dr. Maxi Lees disease     History of blood transfusion     Hypermenorrhea     Irregular menses     Liver disease     Menorrhagia     Mild persistent asthma without complication     Morbid obesity ([facility]     Nexplanon removal     Thyroid disorder     UTI (lower urinary tract infection)     chronic UTI's with this pregnancy - last     UTI (urinary tract infection)     Vertigo        Past Surgical History:        Procedure Laterality Date    ARM SURGERY Left 2021    LEFT ARM FOREIGN BODY REMOVAL performed by Souleymane Flores MD at [facility]      ,     CHOLECYSTECTOMY  2010    FOOT SURGERY Left 2017    lipoma removed    HERNIA REPAIR N/A     umbilical    HERNIA REPAIR  2013    LAPAROSCOPY  2013    diagnostic    THYROIDECTOMY Bilateral 2020    TOTAL THYROIDECTOMY performed by Souleymane Flores MD at [facility] 2012    Antonio -Dr. Alba Alston       Social History:    Social History     Tobacco Use    Smoking status: Former     Packs/day: 0.10     Years: 1.00     Pack years: 0.10     Types: Cigarettes     Start date:      Quit date: 2013     Years since quittin.6    Smokeless tobacco: Never   Substance Use Topics    Alcohol use: No                                Counseling given: Not Answered      Vital Signs (Current): There were no vitals filed for this visit. BP Readings from Last 3 Encounters:   22 120/72   22 120/63   22 118/72       NPO Status:                                                                                 BMI:   Wt Readings from Last 3 Encounters:   22 (!) 305 lb (138.3 kg)   22 (!) 305 lb 6.4 oz (138.5 kg)   22 (!) 304 lb (137.9 kg)     There is no height or weight on file to calculate BMI.    CBC:   Lab Results   Component Value Date/Time    WBC 13.5 2022 08:42 AM    RBC 5.18 2022 08:42 AM    HGB 11.6 2022 08:42 AM    HCT 41.0 2022 08:42 AM    MCV 79.2 2022 08:42 AM    RDW 16.8 2022 08:42 AM     2022 08:42 AM       CMP:   Lab Results   Component Value Date/Time     2022 08:42 AM    K 4.2 2022 08:42 AM     2022 08:42 AM    CO2 25 2022 08:42 AM    BUN 9 2022 08:42 AM    CREATININE 0.7 2022 08:42 AM    GFRAA >60 2022 08:42 AM    LABGLOM >60 2022 08:42 AM    GLUCOSE 127 2022 08:42 AM    PROT 7.5 2022 08:42 AM    PROT 8.2 2012 08:20 PM    CALCIUM 8.7 2022 08:42 AM    BILITOT 0.6 2022 08:42 AM    ALKPHOS 79 2022 08:42 AM    AST 27 2022 08:42 AM    ALT 29 2022 08:42 AM       POC Tests: No results for input(s): POCGLU, POCNA, POCK, POCCL, POCBUN, POCHEMO, POCHCT in the last 72 hours.     Coags:   Lab Results   Component Value Date/Time    PROTIME 11.7 03/25/2014 04:35 PM    INR 1.08 03/25/2014 04:35 PM    APTT 30.1 03/25/2014 04:35 PM       HCG (If Applicable):   Lab Results   Component Value Date    PREGTESTUR Negative 04/05/2022        ABGs: No results found for: PHART, PO2ART, GHI2SWO, SOD5GVR, BEART, Y4ZDAWXF     Type & Screen (If Applicable):  No results found for: LABABO, LABRH    Drug/Infectious Status (If Applicable):  No results found for: HIV, HEPCAB    COVID-19 Screening (If Applicable):   Lab Results   Component Value Date/Time    COVID19 NOT DETECTED 08/25/2021 12:00 PM           Anesthesia Evaluation  Patient summary reviewed  Airway: Mallampati: III  TM distance: >3 FB   Neck ROM: full  Mouth opening: > = 3 FB   Dental:    (+) upper dentures      Pulmonary:normal exam  breath sounds clear to auscultation  (+) asthma:                            Cardiovascular:          ECG reviewed  Rhythm: regular    Echocardiogram reviewed  Stress test reviewed             ROS comment: Normal sinus rhythm   Possible Left atrial enlargement   Borderline ECG   When compared with ECG of 25-FEB-2020 16:41,   Vent. rate has decreased BY  48 BPM   Confirmed by Bruno Pressushma ARTIS, Sandra Mota ([id number]) on 7/2/2022 3:02:23 PM     Summary   ECG portion of stress test is negative for ischemia by diagnostic criteria. Completed 4.10 Mins of exercise and 6 METS , Peak Bp was 158/78   Reduced exercise capacity   Lowery Score of 4 ( moderate risk)      Signature      ------------------------------------------------------------------   Electronically signed by Kavita Wagner MD (Interpreting   physician) on 07/25/2022 at 01:04 PM   ------------------------------------------------------------------     Summary   Left ventricular function and size is normal, EF is estimated at 55-60%. Mild left ventricular hypertrophy. Grade I diastolic dysfunction. No regional wall motion abnormalities were detected. Mild pulmonic and tricuspid regurgitation present. RVSP is 30 mmHg.    No evidence of pericardial effusion. Signature      ------------------------------------------------------------------   Electronically signed by Courtney Velez MD (Interpreting   physician) on 08/03/2022 at 05:04 PM     Neuro/Psych:   (+) depression/anxiety              ROS comment: clbp   Vertigo and fatigue( er visit 8/3) GI/Hepatic/Renal:   (+) GERD:, liver disease:, morbid obesity         ROS comment: Fatty liver. Endo/Other:    (+) hypothyroidism, blood dyscrasia: anemia:., .                  ROS comment: S/p THYROIDECTOMY Abdominal:   (+) obese,           Vascular: negative vascular ROS. Other Findings:             Anesthesia Plan      MAC     ASA 3     (Chart review only 8/11/22  Needs urine preg test)  Induction: intravenous. MIPS: Postoperative opioids intended. Anesthetic plan and risks discussed with patient. Plan discussed with CRNA. Attending anesthesiologist reviewed and agrees with Pre Eval content                DADA Jackson - JENNIFER   8/11/2022       Pre Anesthesia Assessment complete.  Chart reviewed on 8/11/2022

## 2022-08-11 NOTE — PROGRESS NOTES
Spoke with patient and she will arrive at 0630 at [facility] on 8/12/2022 for her procedure at 0830.

## 2022-08-12 ENCOUNTER — OFFICE VISIT (OUTPATIENT)
Dept: CARDIOLOGY CLINIC | Age: 32
End: 2022-08-12
Payer: COMMERCIAL

## 2022-08-12 ENCOUNTER — ANESTHESIA (OUTPATIENT)
Dept: ENDOSCOPY | Age: 32
End: 2022-08-12
Payer: COMMERCIAL

## 2022-08-12 ENCOUNTER — HOSPITAL ENCOUNTER (OUTPATIENT)
Age: 32
Setting detail: OUTPATIENT SURGERY
Discharge: HOME | End: 2022-08-12
Attending: SURGERY | Admitting: SURGERY
Payer: COMMERCIAL

## 2022-08-12 VITALS
WEIGHT: 293 LBS | HEIGHT: 63 IN | HEART RATE: 76 BPM | DIASTOLIC BLOOD PRESSURE: 78 MMHG | BODY MASS INDEX: 51.91 KG/M2 | SYSTOLIC BLOOD PRESSURE: 116 MMHG

## 2022-08-12 VITALS
HEART RATE: 84 BPM | RESPIRATION RATE: 16 BRPM | SYSTOLIC BLOOD PRESSURE: 110 MMHG | WEIGHT: 293 LBS | TEMPERATURE: 97.4 F | DIASTOLIC BLOOD PRESSURE: 77 MMHG | BODY MASS INDEX: 50.02 KG/M2 | HEIGHT: 64 IN | OXYGEN SATURATION: 97 %

## 2022-08-12 DIAGNOSIS — E66.01 MORBID OBESITY (HCC): ICD-10-CM

## 2022-08-12 DIAGNOSIS — E66.01 MORBID OBESITY WITH BMI OF 50.0-59.9, ADULT (HCC): ICD-10-CM

## 2022-08-12 DIAGNOSIS — Z01.818 PRE-OP EVALUATION: Primary | ICD-10-CM

## 2022-08-12 LAB — PREGNANCY TEST URINE, POC: NEGATIVE

## 2022-08-12 PROCEDURE — 2500000003 HC RX 250 WO HCPCS

## 2022-08-12 PROCEDURE — 2709999900 HC NON-CHARGEABLE SUPPLY: Performed by: SURGERY

## 2022-08-12 PROCEDURE — 6370000000 HC RX 637 (ALT 250 FOR IP)

## 2022-08-12 PROCEDURE — 3609012400 HC EGD TRANSORAL BIOPSY SINGLE/MULTIPLE: Performed by: SURGERY

## 2022-08-12 PROCEDURE — 88305 TISSUE EXAM BY PATHOLOGIST: CPT | Performed by: PATHOLOGY

## 2022-08-12 PROCEDURE — 2580000003 HC RX 258: Performed by: ANESTHESIOLOGY

## 2022-08-12 PROCEDURE — 7100000011 HC PHASE II RECOVERY - ADDTL 15 MIN: Performed by: SURGERY

## 2022-08-12 PROCEDURE — G8427 DOCREV CUR MEDS BY ELIG CLIN: HCPCS | Performed by: INTERNAL MEDICINE

## 2022-08-12 PROCEDURE — 7100000010 HC PHASE II RECOVERY - FIRST 15 MIN: Performed by: SURGERY

## 2022-08-12 PROCEDURE — 81025 URINE PREGNANCY TEST: CPT

## 2022-08-12 PROCEDURE — 43239 EGD BIOPSY SINGLE/MULTIPLE: CPT | Performed by: SURGERY

## 2022-08-12 PROCEDURE — G8417 CALC BMI ABV UP PARAM F/U: HCPCS | Performed by: INTERNAL MEDICINE

## 2022-08-12 PROCEDURE — 3700000000 HC ANESTHESIA ATTENDED CARE: Performed by: SURGERY

## 2022-08-12 PROCEDURE — 88342 IMHCHEM/IMCYTCHM 1ST ANTB: CPT | Performed by: PATHOLOGY

## 2022-08-12 PROCEDURE — 3700000001 HC ADD 15 MINUTES (ANESTHESIA): Performed by: SURGERY

## 2022-08-12 PROCEDURE — 99213 OFFICE O/P EST LOW 20 MIN: CPT | Performed by: INTERNAL MEDICINE

## 2022-08-12 PROCEDURE — 6360000002 HC RX W HCPCS

## 2022-08-12 PROCEDURE — 1036F TOBACCO NON-USER: CPT | Performed by: INTERNAL MEDICINE

## 2022-08-12 RX ORDER — FAMOTIDINE 20 MG/1
20 TABLET, FILM COATED ORAL 2 TIMES DAILY
Qty: 60 TABLET | Refills: 3 | Status: SHIPPED | OUTPATIENT
Start: 2022-08-12

## 2022-08-12 RX ORDER — SODIUM CHLORIDE, SODIUM LACTATE, POTASSIUM CHLORIDE, CALCIUM CHLORIDE 600; 310; 30; 20 MG/100ML; MG/100ML; MG/100ML; MG/100ML
INJECTION, SOLUTION INTRAVENOUS CONTINUOUS
Status: DISCONTINUED | OUTPATIENT
Start: 2022-08-12 | End: 2022-08-12 | Stop reason: HOSPADM

## 2022-08-12 RX ORDER — MIDAZOLAM HYDROCHLORIDE 1 MG/ML
INJECTION INTRAMUSCULAR; INTRAVENOUS PRN
Status: DISCONTINUED | OUTPATIENT
Start: 2022-08-12 | End: 2022-08-12 | Stop reason: SDUPTHER

## 2022-08-12 RX ORDER — ACETAMINOPHEN 500 MG
1000 TABLET ORAL ONCE
Status: DISCONTINUED | OUTPATIENT
Start: 2022-08-12 | End: 2022-08-12

## 2022-08-12 RX ORDER — ALBUTEROL SULFATE 90 UG/1
AEROSOL, METERED RESPIRATORY (INHALATION) PRN
Status: DISCONTINUED | OUTPATIENT
Start: 2022-08-12 | End: 2022-08-12 | Stop reason: SDUPTHER

## 2022-08-12 RX ORDER — PROPOFOL 10 MG/ML
INJECTION, EMULSION INTRAVENOUS PRN
Status: DISCONTINUED | OUTPATIENT
Start: 2022-08-12 | End: 2022-08-12 | Stop reason: SDUPTHER

## 2022-08-12 RX ORDER — LIDOCAINE HYDROCHLORIDE 20 MG/ML
INJECTION, SOLUTION EPIDURAL; INFILTRATION; INTRACAUDAL; PERINEURAL PRN
Status: DISCONTINUED | OUTPATIENT
Start: 2022-08-12 | End: 2022-08-12 | Stop reason: SDUPTHER

## 2022-08-12 RX ADMIN — MIDAZOLAM 1 MG: 1 INJECTION INTRAMUSCULAR; INTRAVENOUS at 08:51

## 2022-08-12 RX ADMIN — PROPOFOL 500 MG: 10 INJECTION, EMULSION INTRAVENOUS at 08:57

## 2022-08-12 RX ADMIN — LIDOCAINE HYDROCHLORIDE 100 MG: 20 INJECTION, SOLUTION EPIDURAL; INFILTRATION; INTRACAUDAL; PERINEURAL at 08:57

## 2022-08-12 RX ADMIN — ALBUTEROL SULFATE 2 PUFF: 90 AEROSOL, METERED RESPIRATORY (INHALATION) at 08:46

## 2022-08-12 RX ADMIN — SODIUM CHLORIDE, POTASSIUM CHLORIDE, SODIUM LACTATE AND CALCIUM CHLORIDE: 600; 310; 30; 20 INJECTION, SOLUTION INTRAVENOUS at 07:02

## 2022-08-12 ASSESSMENT — PAIN - FUNCTIONAL ASSESSMENT: PAIN_FUNCTIONAL_ASSESSMENT: 0-10

## 2022-08-12 ASSESSMENT — PAIN SCALES - GENERAL
PAINLEVEL_OUTOF10: 0
PAINLEVEL_OUTOF10: 0

## 2022-08-12 NOTE — H&P
History and Physical Update    Original H&P done in office on 7/27/2022 (less than 30 days ago). Pt reports the following changes in health since being seen last: None    Vitals:    08/12/22 0653   BP: 136/82   Pulse: 73   Resp: 16   Temp: 97 °F (36.1 °C)   SpO2: 96%       Alert and oriented x 3, no apparent distress at rest  Atraumatic, normocephalic. EOMI. Breathing unlabored. RRR. Soft, non-tender, non-distended. Moves all extremities. Warm, dry. Chantelle Rojas is a 28 yo female here today for EGD    -Consent obtained in office.  -Reviewed expected pre-operative, operative, and post-operative courses. -Answered questions to patient's satisfaction.   -Reviewed risks, benefits, alternative to procedure.   -Proceed as scheduled. -The patient was counseled at length about the risks of luis Covid-19 during their perioperative period and any recovery window from their procedure. The patient was made aware that luis Covid-19  may worsen their prognosis for recovering from their procedure  and lend to a higher morbidity and/or mortality risk. All material risks, benefits, and reasonable alternatives including postponing the procedure were discussed. The patient does wish to proceed with the procedure at this time. DADA Gaming - CNP     Pt seen in same day. No issues. Was seen recently by Merary Oswald, Psych. Had repeat CT chest - showed improvement from previous CT. All questions answered. Proceed as scheduled for EGD.     Richar Richards MD

## 2022-08-12 NOTE — PATIENT INSTRUCTIONS
Please be informed that if you contact our office outside of normal business hours the physician on call cannot help with any scheduling or rescheduling issues, procedure instruction questions or any type of medication issue. We advise you for any urgent/emergency that you go to the nearest emergency room! PLEASE CALL OUR OFFICE DURING NORMAL BUSINESS HOURS    Monday - Friday   8 am to 5 pm    [place] [address]: [phone]    [place]:  [phone]  **It is YOUR responsibilty to bring medication bottles and/or updated medication list to [address] [place] [address]. This will allow us to better serve you and all your healthcare needs**  [facility] Locations - No appointment necessary. Sites open Monday to Friday. Call your preferred location for test preparation, business hours and other information you need. [employer] accepts [employer]. [facility] [address] [place], [address] [facility]  Phone: [phone] [place] [address] [facility]  [address]. [place], [address] [facility]  Phone: [phone]     Pre-op evaluation:     Patient is going for weight loss surgery for Morbid Obesity. Chance Orosco female with No chest pain & low cardiac risk factor profile, does C/O ROBLEDO . EKG has non specific ST-T abnormalities. Has family H/O CAD.    7/25/2022   ECG portion of stress test is negative for ischemia by diagnostic criteria. Completed 4.10 Mins of exercise and 6 METS , Peak Bp was 158/78   Reduced exercise capacity   Lowery Score of 4 ( moderate risk)    ECHO 8/3/2022   Left ventricular function and size is normal, EF is estimated at 55-60%. Mild left ventricular hypertrophy. Grade I diastolic dysfunction. No regional wall motion abnormalities were detected. Mild pulmonic and tricuspid regurgitation present. RVSP is 30 mmHg. No evidence of pericardial effusion. Patient is considered a low risk candidate for surgery.     TESTS ORDERED:none this visit     PREVIOUSLY ORDERED TESTS REVIEWED & DISCUSSED WITH THE PATIENT:     I personally reviewed & interpreted, all previously ordered tests as copied above. Latest Labs are pulled in to the note with dates. Labs, specially in Reference to Lipid profile, Cardiac testing in the form of Echo ( dated: ), stress tests ( dated: ) & other relevant cardiac testing reviewed with patient & recommendations made based on assessment of the results. Discussed role of Cardiac risk factors & effects + treatment of co morbidities with patient & advised accordingly. MEDICATIONS: List of medications patient is currently taking is reviewed in detail with the patient & family member present. Discussed any side effects or problems taking the medication. Recommend Continue present management & medications as listed. AFFIRMATION: I spent at least 20 minutes of time reviewing patient's history, previous & current medical problems & all Labs + testing. This includes chart prep even prior to the vosit. Various goals are discussed and multiple questions answered. Relevant concelling performed. Office follow up in six months.

## 2022-08-12 NOTE — OP NOTE
PROCEDURE NOTE    DATE OF PROCEDURE: 8/12/2022     SURGEON: Cruz Varela MD , FACS    ASSISTANT: None    PREOPERATIVE DIAGNOSIS:  1. Morbid obesity with planned bariatric surgery    POSTOPERATIVE DIAGNOSIS:  1. Small to moderate sliding hiatal hernia   2. Mild superficial gastritis without bleeding or ulceration, antrum    OPERATION: Esophagogastroduodenoscopy with biopsies    ANESTHESIA: Local monitored anesthesia    ESTIMATED BLOOD LOSS: None    COMPLICATIONS: None apparent    SPECIMENS: were obtained    HISTORY: The patient is a 27y.o. year old female with history of the above preoperative diagnosis. I recommended esophagogastroduodenoscopy with possible biopsy and I explained the risk, benefits, expected outcome, and alternatives to the procedure. Risks included but are not limited to bleeding, infection, respiratory distress, hypotension, and perforation of the esophagus, stomach, or duodenum. Patient understands and is in agreement, wishing to proceed. PROCEDURE: The patient was brought into the endoscopy suite and the appropriate monitors were connected to the patient. A timeout was held with all members of the procedure team present and in agreement. The patient was positioned in the left lateral decubitus position with a bite block in place. The endoscope was inserted into the patient's mouth and advanced from the oropharynx into the hypopharynx without difficulty and under direct vision. The scope was then advanced through the patient's esophagus under direct vision into the stomach, pylorus, and duodenum. A retroflexed view of the gastroesophageal junction was performed. Biopsies were taken.  A summary of the findings are as follows:      Duodenum:     Descending: normal    Bulb: normal    Stomach:    Antrum: abnormal: mild gastritis, biopsied    Body: normal    Fundus: abnormal: sliding hiatal hernia    Esophagus: normal, GEJ ~ 40 cm from incisors    Larynx: normal    The stomach was desufflated and the endoscope was removed. The patient tolerated the procedure well, and was transferred to the PACU following the procedure in stable condition.     IMPRESSION/PLAN:   Sliding hiatal hernia  Antral gastritis  F/u biopsy results  Start pepcid     Electronically signed by Paul Salomon II, MD  on 8/12/2022 at 9:04 AM

## 2022-08-12 NOTE — PROGRESS NOTES
Received report from Shelley De, [facility]. Patient alert and oriented. Verified patient's name, , allergies and procedure. No beta blockers, no blood thinners, no implants. Sara Linn boyfriend at bedside.

## 2022-08-12 NOTE — ANESTHESIA POSTPROCEDURE EVALUATION
Department of Anesthesiology  Postprocedure Note    Patient: Tatianna Salazar  MRN: [id number]  YOB: 1990  Date of evaluation: 8/12/2022      Procedure Summary     Date: 08/12/22 Room / Location: [address]    Anesthesia Start: 2854 Anesthesia Stop: 8974    Procedure: EGD BIOPSY Diagnosis:       Morbid obesity ([address]      (Morbid obesity ([facility] [E66.01])    Surgeons: Porfirio Riojas MD Responsible Provider: Magui Fonseca MD    Anesthesia Type: MAC ASA Status: 3          Anesthesia Type: MAC    Jose Luis Phase I:      Jose Luis Phase II:        Anesthesia Post Evaluation    Patient location during evaluation: bedside  Patient participation: complete - patient participated  Level of consciousness: awake  Pain score: 0  Airway patency: patent  Nausea & Vomiting: no vomiting and no nausea  Complications: no  Cardiovascular status: hemodynamically stable  Respiratory status: acceptable, airway suctioned, spontaneous ventilation and room air  Hydration status: stable

## 2022-08-12 NOTE — DISCHARGE INSTRUCTIONS
EGD    ____Cristiana______________________________    OFFICE MZYRNY___[phone]________________    FOLLOW UP APPOINTMENT  AS Scheduled. REPEAT PROCEDURE AS  NEEDED. TEST ORDERED:NONE     What to Expect at Home  Your Recovery:  The only discomfort after your EGD is generally limited to a mild soreness of the throat, which may last a day or two. Call your physician immediately if you have severe chest pain, shortness of breath or a temperature of 100 degrees or higher if taken orally. How can you care for yourself at home? Activity  Rest as much as you need to after you go home. You should be able to go back to your usual activities the day after the test.  Diet  Follow your doctor's directions for eating after the test.  Drink plenty of fluids (unless your doctor has told you not to). Medications  If you have a sore throat the day after the test, use an over-the-counter spray to numb your throat. Your doctor will tell you if and when you can restart your medicines. He or she will also give you instructions about taking any new medicines. If you take blood thinners, such as warfarin (Coumadin), clopidogrel (Plavix), or aspirin, be sure to talk to your doctor. He or she will tell you if and when to start taking those medicines again. Make sure that you understand exactly what your doctor wants you to do. If a biopsy was done during the test, your doctor may tell you not to take aspirin or other anti-inflammatory medicines for a few days. These include ibuprofen (Advil, Motrin) and naproxen (Aleve). DO NOT DRINK ANYTHING WITH ALCOHOL TODAY. Other instructions:Anesthesia  For your safety, do not drive or operate machinery for 24 hours. Do not sign legal documents or make major decisions for 24 hours. The anesthesia can make it hard for you to fully understand what you are agreeing to. Follow-up care is a key part of your treatment and safety.  Be sure to make and go to all appointments, and call your doctor if you are having problems. It's also a good idea to know your test results and keep a list of the medicines you take. When should you call for help? [address] [facility] [place] Junior Michele [phone]  Call 911 anytime you think you may need emergency care. For example, call if:  You passed out (lost consciousness). You cough up blood. You vomit blood or what looks like coffee grounds. You pass maroon or very bloody stools. Call your doctor now or seek immediate medical care if:  You have trouble swallowing. You have belly pain. Your stools are black and tarlike or have streaks of blood. You are sick to your stomach or cannot keep fluids down. Watch closely for changes in your health, and be sure to contact your doctor if:  Your throat still hurts after a day or two. You do not get better as expected. Where can you learn more? Go to https://PuuilopePowerspaneb.CloudMine. org and sign in to your ROBLOX account. Enter P769 in the SureVisit box to learn more about Upper GI Endoscopy: What to Expect at Home.     If you do not have an account, please click on the Sign Up Now link. © 7160-0352 Healthwise, Incorporated. Care instructions adapted under license by [facility] ([facility]). This care instruction is for use with your licensed healthcare professional. If you have questions about a medical condition or this instruction, always ask your healthcare professional. [place] [address] any warranty or liability for your use of this information. Content Version: 73.0.033742; Current as of: November 20, 2015             [facility]  [phone]    Do not drive, work around [address] [place], [place] [address] St or use equipment. Do not drink any alcoholic beverages. Do not smoke while alone. Avoid making important decisions. Plan to spend a quiet, relaxed evening @ home.   Resume normal activities as you begin to isolate at home during their illness. You should restrict activities outside your home, except for getting medical care. Do not go to work, school, or public areas. Avoid using public transportation, ride-sharing, or taxis. Separate yourself from other people and animals in your home  People: As much as possible, you should stay in a specific room and away from other people in your home. Also, you should use a separate bathroom, if available. Animals: You should restrict contact with pets and other animals while you are sick with COVID-19, just like you would around other people. Although there have not been reports of pets or other animals becoming sick with COVID-19, it is still recommended that people sick with COVID-19 limit contact with animals until more information is known about the virus. When possible, have another member of your household care for your animals while you are sick. If you are sick with COVID-19, avoid contact with your pet, including petting, snuggling, being kissed or licked, and sharing food. If you must care for your pet or be around animals while you are sick, wash your hands before and after you interact with pets and wear a facemask. Call ahead before visiting your doctor  If you have a medical appointment, call the healthcare provider and tell them that you have or may have COVID-19. This will help the healthcare providers office take steps to keep other people from getting infected or exposed. Wear a facemask  You should wear a facemask when you are around other people (e.g., sharing a room or vehicle) or pets and before you enter a healthcare providers office. If you are not able to wear a facemask (for example, because it causes trouble breathing), then people who live with you should not stay in the same room with you, or they should wear a facemask if they enter your room. Cover your coughs and sneezes  Cover your mouth and nose with a tissue when you cough or sneeze. Throw used tissues in a lined trash can. Immediately wash your hands with soap and water for at least 20 seconds or, if soap and water are not available, clean your hands with an alcohol-based hand  that contains at least 60% alcohol. Clean your hands often  Wash your hands often with soap and water for at least 20 seconds, especially after blowing your nose, coughing, or sneezing; going to the bathroom; and before eating or preparing food. If soap and water are not readily available, use an alcohol-based hand  with at least 60% alcohol, covering all surfaces of your hands and rubbing them together until they feel dry. Soap and water are the best option if hands are visibly dirty. Avoid touching your eyes, nose, and mouth with unwashed hands. Avoid sharing personal household items  You should not share dishes, drinking glasses, cups, eating utensils, towels, or bedding with other people or pets in your home. After using these items, they should be washed thoroughly with soap and water. Clean all high-touch surfaces everyday  High touch surfaces include counters, tabletops, doorknobs, bathroom fixtures, toilets, phones, keyboards, tablets, and bedside tables. Also, clean any surfaces that may have blood, stool, or body fluids on them. Use a household cleaning spray or wipe, according to the label instructions. Labels contain instructions for safe and effective use of the cleaning product including precautions you should take when applying the product, such as wearing gloves and making sure you have good ventilation during use of the product. Monitor your symptoms  Seek prompt medical attention if your illness is worsening (e.g., difficulty breathing). Before seeking care, call your healthcare provider and tell them that you have, or are being evaluated for, COVID-19. Put on a facemask before you enter the facility.  These steps will help the healthcare providers office to keep other people in the office or waiting room from getting infected or exposed. Ask your healthcare provider to call the local or state health department. Persons who are placed under active monitoring or facilitated self-monitoring should follow instructions provided by their local health department or occupational health professionals, as appropriate. When working with your local health department check their available hours. If you have a medical emergency and need to call 911, notify the dispatch personnel that you have, or are being evaluated for COVID-19. If possible, put on a facemask before emergency medical services arrive. Discontinuing home isolation  Patients with confirmed COVID-19 should remain under home isolation precautions until the risk of secondary transmission to others is thought to be low. The decision to discontinue home isolation precautions should be made on a case-by-case basis, in consultation with healthcare providers and state and local health departments.

## 2022-08-12 NOTE — PROGRESS NOTES
OFFICE PROGRESS NOTES      Elena Ashby is a 27 y.o. female who has    CHIEF COMPLAINT AS FOLLOWS:  CHEST PAIN: Patient denies any C/O chest pains at this time. SOB:  Has SOB with exertion but no change over previous noted. LEG EDEMA: No leg edema   PALPITATIONS: Denies any C/O Palpitations   DIZZINESS: No C/O Dizziness   SYNCOPE: None   OTHER/ ADDITIONAL COMPLAINTS:                                     HPI: Patient is here for F/U on her pre-op evaluation & Morbid obesity problems. Current Outpatient Medications   Medication Sig Dispense Refill    famotidine (PEPCID) 20 MG tablet Take 1 tablet by mouth in the morning and 1 tablet before bedtime. 60 tablet 3    albuterol sulfate HFA (VENTOLIN HFA) 108 (90 Base) MCG/ACT inhaler Inhale 2 puffs into the lungs 4 times daily as needed for Wheezing 54 g 1    levothyroxine (SYNTHROID) 175 MCG tablet Take 1 tablet by mouth daily       MECLIZINE HCL PO Take by mouth 3 times daily as needed (Patient not taking: Reported on 8/12/2022)      ferrous sulfate (IRON 325) 325 (65 Fe) MG tablet Take 1 tablet by mouth 3 times daily (with meals) (Patient not taking: Reported on 8/12/2022) 30 tablet 5    Prenatal Vit-Fe Fumarate-FA (PRENATAL VITAMINS) 28-0.8 MG TABS Take 1 tablet by mouth daily (any prenatal vitamin covered) (Patient not taking: Reported on 8/12/2022) 30 tablet 11     No current facility-administered medications for this visit.      Allergies: Pcn [penicillins], Morphine, and Gabapentin  Review of Systems:    Constitutional: Negative for diaphoresis and fatigue  Respiratory: Negative for shortness of breath  Cardiovascular: Negative for chest pain, dyspnea on exertion, claudication, edema, irregular heartbeat, murmur, palpitations or shortness of breath  Musculoskeletal: Negative for muscle pain, muscular weakness, negative for pain in arm and leg or swelling in foot and leg    Objective:  LMP 08/09/2022   Wt Readings from Last 3 Encounters: AM    K 4.4 03/04/2020 05:24 AM     04/08/2022 08:42 AM     03/04/2020 05:24 AM    CO2 25 04/08/2022 08:42 AM    CO2 25 03/04/2020 05:24 AM    BUN 9 04/08/2022 08:42 AM    BUN 8 03/04/2020 05:24 AM    CREATININE 0.7 04/08/2022 08:42 AM    CREATININE 0.4 03/04/2020 05:24 AM    PROT 7.5 04/08/2022 08:42 AM    PROT 6.7 03/04/2020 05:24 AM    PROT 8.2 07/11/2012 08:20 PM    PROT 7.2 06/26/2012 03:10 PM     Lab Results   Component Value Date/Time    TSHHS 9.910 04/08/2022 08:42 AM    TSHHS <0.010 03/04/2020 05:24 AM       QUALITY MEASURES REVIEWED:  1.CAD:Patient is taking anti platelet agent:No  Patient does not have Hx of documented CAD  2. DYSLIPIDEMIA: Patient is on cholesterol lowering medication:No  3. Beta-Blocker therapy for CAD, if prior Myocardial Infarction:No   4. Counselled regarding smoking cessation. No   Patient does not Smoke. 5.Anticoagulation therapy (for A.Fib) No   Does Not have A.Fib.  6.Discussed weight management strategies. Assessment & Plan:  Primary / Secondary prevention is the goal by aggressive risk modification, healthy and therapeutic life style changes for cardiovascular risk reduction. Pre-op evaluation:     Patient is going for weight loss surgery for Morbid Obesity. [employer] female with No chest pain & low cardiac risk factor profile, does C/O ROBLEDO . EKG has non specific ST-T abnormalities. Has family H/O CAD.    7/25/2022   ECG portion of stress test is negative for ischemia by diagnostic criteria. Completed 4.10 Mins of exercise and 6 METS , Peak Bp was 158/78   Reduced exercise capacity   Lowery Score of 4 ( moderate risk)    ECHO 8/3/2022   Left ventricular function and size is normal, EF is estimated at 55-60%. Mild left ventricular hypertrophy. Grade I diastolic dysfunction. No regional wall motion abnormalities were detected. Mild pulmonic and tricuspid regurgitation present. RVSP is 30 mmHg. No evidence of pericardial effusion.     Patient is considered a low risk candidate for surgery. TESTS ORDERED:none this visit     PREVIOUSLY ORDERED TESTS REVIEWED & DISCUSSED WITH THE PATIENT:     I personally reviewed & interpreted, all previously ordered tests as copied above. Latest Labs are pulled in to the note with dates. Labs, specially in Reference to Lipid profile, Cardiac testing in the form of Echo ( dated: ), stress tests ( dated: ) & other relevant cardiac testing reviewed with patient & recommendations made based on assessment of the results. Discussed role of Cardiac risk factors & effects + treatment of co morbidities with patient & advised accordingly. MEDICATIONS: List of medications patient is currently taking is reviewed in detail with the patient & family member present. Discussed any side effects or problems taking the medication. Recommend Continue present management & medications as listed. AFFIRMATION: I spent at least 20 minutes of time reviewing patient's history, previous & current medical problems & all Labs + testing. This includes chart prep even prior to the vosit. Various goals are discussed and multiple questions answered. Relevant concelling performed. Office follow up in six months.

## 2022-08-12 NOTE — LETTER
[address]  [address] [address]  Phone: [phone]  Fax: [phone]    Rajan Agustin MD    August 12, 2022     Latisha Almonte, [address] [place] [id number]    Patient: Joan Keller   MR Number: [id number]   YOB: 1990   Date of Visit: 8/12/2022       Dear Latisha Almonte:    Thank you for referring Alexandr Byrnes to me for evaluation/treatment. Below are the relevant portions of my assessment and plan of care. If you have questions, please do not hesitate to call me. I look forward to following Juani along with you.     Sincerely,      Rajan Agustin MD

## 2022-08-12 NOTE — PROGRESS NOTES
0914 Pt received from [facility]. Pt drowsy but awakens on command. Pt abdomen soft and non tender. Boyfriend at bedside. Pt declined beverage offer. 0930 IN to check on pt. Pt fully awake and denies c/o or needs. Call light in reach. Boyfriend states that she is passing gas. 1000 In to check on pt. Pt now c/o headache. Pt given water. Call light in reach. [address] [facility] Gaviota Berger report given on pt c/o headache. Order received to give Tylenol 1000 mg PO now. 1015 In to check on pt. Pt now states that headache is gone and doesn't need tylenol. 1020 Pt up to side of bed with assist. Pt tolerated well and ready to get dressed to go home. Boyfriend at bedside. Call light in reach. 1030 Pt discharged to home per wheelchair to boyfriends vehicle to drive pt home. Pt denies c/o.

## 2022-08-12 NOTE — PROGRESS NOTES
Patient is back to baseline. Alert and oriented. Report given to Soledad Swanson RN. Girishfrienmichelle, Odilon Esquivel at bedside.

## 2022-08-25 ENCOUNTER — OFFICE VISIT (OUTPATIENT)
Dept: BARIATRICS/WEIGHT MGMT | Age: 32
End: 2022-08-25
Payer: COMMERCIAL

## 2022-08-25 VITALS
HEIGHT: 64 IN | DIASTOLIC BLOOD PRESSURE: 80 MMHG | HEART RATE: 89 BPM | SYSTOLIC BLOOD PRESSURE: 136 MMHG | WEIGHT: 293 LBS | BODY MASS INDEX: 50.02 KG/M2 | OXYGEN SATURATION: 97 %

## 2022-08-25 DIAGNOSIS — Z01.818 PRE-OP EVALUATION: ICD-10-CM

## 2022-08-25 DIAGNOSIS — E66.01 MORBID OBESITY WITH BMI OF 50.0-59.9, ADULT (HCC): Primary | ICD-10-CM

## 2022-08-25 PROCEDURE — 99213 OFFICE O/P EST LOW 20 MIN: CPT | Performed by: NURSE PRACTITIONER

## 2022-08-25 ASSESSMENT — PATIENT HEALTH QUESTIONNAIRE - PHQ9
1. LITTLE INTEREST OR PLEASURE IN DOING THINGS: 0
SUM OF ALL RESPONSES TO PHQ QUESTIONS 1-9: 0
2. FEELING DOWN, DEPRESSED OR HOPELESS: 0
SUM OF ALL RESPONSES TO PHQ9 QUESTIONS 1 & 2: 0
SUM OF ALL RESPONSES TO PHQ QUESTIONS 1-9: 0

## 2022-08-25 NOTE — PROGRESS NOTES
BARIATRIC SURGERY OFFICE PROGRESS NOTE    SUBJECTIVE:    Patient presenting today referred from Estefani Villanueva MD, for   Chief Complaint   Patient presents with    Weight Management     5th surg wm visit. +card +pulm +psych- EGD 08/12/22 (OP/PATH IN CHART)   . Vitals:    08/25/22 1624   BP: 136/80   Pulse: 89   SpO2: 97%        BMI: Body mass index is 53.4 kg/m². Weight History: Wt Readings from Last 3 Encounters:   08/25/22 (!) 311 lb 1.6 oz (141.1 kg)   08/15/22 (!) 305 lb (138.3 kg)   08/12/22 (!) 310 lb (140.6 kg)         HPI:Juani Pham is a 27 y.o. female presenting in fifth bariatric PRE-OP visit    Diet Recall: Total weight loss/gain: +5.7 lbs since last visit, and -10.4 lbs since starting program     Protein: stopped tracking calories  Water Intake: 4 bottles daily  Exercise: staying active/ walks some  New health problems: denies  New medications: denies  Clearances:  -- Cardiology: cleared 8/5/22  -- Pulmonology: cleared 8/15/22  -- Psychology: cleared 8/9/22  EGD: superficial gastritis, moderate sliding hernia; negative for Hpylori    Thoroughly reviewed the patient's medical history, family history, social history and review of systems with the patient today in the office. Please see medical record for pertinent positives. Past Medical History:   Diagnosis Date    Abdominal pain     Abnormal uterine bleeding (AUB)     Acute pelvic pain     Anemia 2010    Depression     Foot ulcer, left ([address] 2017    status post nerve decompression repair, excision of neoplasm, left foot, with muscle flap and graft skin application.     Graves disease Dx: 2/2019    Dr. Nelson Edwards disease     History of blood transfusion     Hypermenorrhea     Irregular menses     Liver disease     Menorrhagia     Mild persistent asthma without complication 92/11/9904    Morbid obesity ([address]     Nexplanon removal     Thyroid disorder     UTI (lower urinary tract infection)     chronic UTI's with this pregnancy - last     UTI (urinary tract infection)     Vertigo         Patient Active Problem List   Diagnosis    Cyst of finger    Previous  delivery affecting pregnancy    Periumbilical abdominal pain    Melena    Rectal bleeding    Iron deficiency anemia    Morbid obesity with BMI of 50.0-59.9, adult ([address]    Chronic GERD    Fatty liver    Chronic bilateral low back pain without sciatica    Neoplasm of uncertain behavior of skin of thigh    Graves disease    Foreign body in soft tissue    Mild persistent asthma without complication    Pre-op evaluation       Past Surgical History:   Procedure Laterality Date    ARM SURGERY Left 2021    LEFT ARM FOREIGN BODY REMOVAL performed by Nima Eaton MD at [address]      ,     CHOLECYSTECTOMY  2010    FOOT SURGERY Left 2017    lipoma removed    HERNIA REPAIR N/A     umbilical    HERNIA REPAIR  2013    LAPAROSCOPY  2013    diagnostic    THYROIDECTOMY Bilateral 2020    TOTAL THYROIDECTOMY performed by Nima Eaton MD at  [facility] [address]  2012    Gastritis -Dr. Courtney Thomas ENDOSCOPY N/A 2022    EGD BIOPSY performed by Santhosh Cm MD at [facility] ENDOSCOPY       Current Outpatient Medications   Medication Sig Dispense Refill    albuterol sulfate HFA (VENTOLIN HFA) 108 (90 Base) MCG/ACT inhaler Inhale 2 puffs into the lungs 4 times daily as needed for Wheezing 54 g 1    levothyroxine (SYNTHROID) 175 MCG tablet Take 1 tablet by mouth daily       famotidine (PEPCID) 20 MG tablet Take 1 tablet by mouth in the morning and 1 tablet before bedtime.  (Patient not taking: Reported on 2022) 60 tablet 3    ferrous sulfate (IRON 325) 325 (65 Fe) MG tablet Take 1 tablet by mouth 3 times daily (with meals) (Patient not taking: Reported on 2022) 30 tablet 5    Prenatal Vit-Fe Fumarate-FA (PRENATAL VITAMINS) 28-0.8 MG TABS Take 1 tablet by mouth daily (any prenatal vitamin covered) (Patient not taking: No sig reported) 30 tablet 11     No current facility-administered medications for this visit. Allergies   Allergen Reactions    Pcn [Penicillins] Hives    Morphine Hives    Gabapentin Rash         Review of Systems   Constitutional: Negative. HENT: Negative. Eyes: Negative. Respiratory: Negative. Cardiovascular: Negative. Gastrointestinal: Negative. Endocrine: Negative. Genitourinary: Negative. Musculoskeletal: Negative. Skin: Negative. Allergic/Immunologic: Negative. Neurological: Negative. Hematological: Negative. Psychiatric/Behavioral: Negative. OBJECTIVE:    /80 (Site: Left Upper Arm, Position: Sitting, Cuff Size: Large Adult)   Pulse 89   Ht 5' 4\" (1.626 m)   Wt (!) 311 lb 1.6 oz (141.1 kg)   LMP 08/09/2022   SpO2 97%   BMI 53.40 kg/m²      Physical Exam  Constitutional:       Appearance: Normal appearance. She is obese. HENT:      Head: Normocephalic. Nose: Nose normal.      Mouth/Throat:      Pharynx: Oropharynx is clear. Eyes:      Conjunctiva/sclera: Conjunctivae normal.      Pupils: Pupils are equal, round, and reactive to light. Cardiovascular:      Rate and Rhythm: Normal rate. Pulses: Normal pulses. Pulmonary:      Effort: Pulmonary effort is normal.      Breath sounds: Normal breath sounds. Abdominal:      Palpations: Abdomen is soft. Musculoskeletal:         General: Normal range of motion. Cervical back: Normal range of motion. Skin:     General: Skin is warm and dry. Capillary Refill: Capillary refill takes less than 2 seconds. Neurological:      General: No focal deficit present. Mental Status: She is alert and oriented to person, place, and time. Psychiatric:         Mood and Affect: Mood normal.         Behavior: Behavior normal.       ASSESSMENT & PLAN:    1.  Morbid obesity with BMI of 50.0-59.9, adult [facility])  -Patient was encouraged to journal all food intake.   -Keep calorie level at approximately 1200, per discussion / plan with registered dietician.  -Protein intake is to be a minimum of 60 grams per day. -Water drinking was encouraged with a goal of 64oz-128oz daily. Beverages are to be calorie free except for milk. Avoid soda. -Continue to increase level of physical activity. 2. Pre-op evaluation  -- Cardiology: cleared 8/5/22  -- Pulmonology: cleared 8/15/22  -- Psychology: cleared 8/9/22  EGD: superficial gastritis, moderate sliding hernia; negative for Hpylori  -- UDS and nicotine ordered  -- Will submit chart next appt depending on lab results    - Urine Drug Screen; Future  - Nicotine and Metabolites; Future    This patient is a female of reproductive age and was advised she should not become pregnant during the bariatric workup process and for at least 12-18 months after surgery. The patient is agreeable to this plan. I spent 25 minutes with the patient face to face today and over 50% of the office visit today was spent in face to face counseling regarding diet and exercise, in preparation for her planned Robotic Sleeve Gastrectomy. Discussed in length complying with the dietary recommendations, complying with the preoperative workup including dietary counseling, exercise physiologist counseling, and pre-operative optimization of pulmonologist and cardiologist.    The patient expressed understanding and willingness to comply nicely; all questions and concerns addressed. No orders of the defined types were placed in this encounter. Orders Placed This Encounter   Procedures    Urine Drug Screen     Standing Status:   Future     Standing Expiration Date:   8/25/2023    Nicotine and Metabolites     Standing Status:   Future     Standing Expiration Date:   8/25/2023         Follow Up:  Return in about 1 month (around 9/25/2022).     Valeria Kumar, APRN - CNP

## 2022-09-06 ENCOUNTER — TELEPHONE (OUTPATIENT)
Dept: BARIATRICS/WEIGHT MGMT | Age: 32
End: 2022-09-06

## 2022-09-06 NOTE — TELEPHONE ENCOUNTER
Called patient she had questions as to when surgery would be. Left message advising unsure due to her last visit is on 9/26 and I would be able to get a date till then and currently scheduling into end of October at this time.

## 2022-09-11 PROBLEM — Z01.818 PRE-OP EVALUATION: Status: RESOLVED | Noted: 2022-08-12 | Resolved: 2022-09-11

## 2022-09-23 ENCOUNTER — HOSPITAL ENCOUNTER (OUTPATIENT)
Age: 32
Discharge: HOME | End: 2022-09-23
Payer: COMMERCIAL

## 2022-09-23 LAB
AMPHETAMINES: NEGATIVE
BARBITURATE SCREEN URINE: NEGATIVE
BENZODIAZEPINE SCREEN, URINE: NEGATIVE
CANNABINOID SCREEN URINE: NEGATIVE
COCAINE METABOLITE: NEGATIVE
OPIATES, URINE: NEGATIVE
OXYCODONE: NEGATIVE
PHENCYCLIDINE, URINE: NEGATIVE

## 2022-09-23 PROCEDURE — 80307 DRUG TEST PRSMV CHEM ANLYZR: CPT

## 2022-09-23 PROCEDURE — G0480 DRUG TEST DEF 1-7 CLASSES: HCPCS

## 2022-09-29 LAB
3-OH-COTININE URINE: <50 NG/ML
ANABASINE URINE: <5 NG/ML
COTININE, URINE: <15 NG/ML
NICOTINE AND METABOLITES: <15 NG/ML

## 2022-09-30 ENCOUNTER — OFFICE VISIT (OUTPATIENT)
Dept: BARIATRICS/WEIGHT MGMT | Age: 32
End: 2022-09-30
Payer: COMMERCIAL

## 2022-09-30 VITALS
HEIGHT: 64 IN | HEART RATE: 78 BPM | OXYGEN SATURATION: 97 % | WEIGHT: 293 LBS | SYSTOLIC BLOOD PRESSURE: 138 MMHG | BODY MASS INDEX: 50.02 KG/M2 | DIASTOLIC BLOOD PRESSURE: 84 MMHG

## 2022-09-30 DIAGNOSIS — Z01.818 PRE-OP EVALUATION: ICD-10-CM

## 2022-09-30 DIAGNOSIS — D50.9 IRON DEFICIENCY ANEMIA, UNSPECIFIED IRON DEFICIENCY ANEMIA TYPE: ICD-10-CM

## 2022-09-30 DIAGNOSIS — E66.01 MORBID OBESITY WITH BMI OF 50.0-59.9, ADULT (HCC): Primary | ICD-10-CM

## 2022-09-30 PROCEDURE — G8417 CALC BMI ABV UP PARAM F/U: HCPCS | Performed by: NURSE PRACTITIONER

## 2022-09-30 PROCEDURE — G8427 DOCREV CUR MEDS BY ELIG CLIN: HCPCS | Performed by: NURSE PRACTITIONER

## 2022-09-30 PROCEDURE — 1036F TOBACCO NON-USER: CPT | Performed by: NURSE PRACTITIONER

## 2022-09-30 PROCEDURE — 99213 OFFICE O/P EST LOW 20 MIN: CPT | Performed by: NURSE PRACTITIONER

## 2022-09-30 ASSESSMENT — PATIENT HEALTH QUESTIONNAIRE - PHQ9
1. LITTLE INTEREST OR PLEASURE IN DOING THINGS: 1
SUM OF ALL RESPONSES TO PHQ QUESTIONS 1-9: 2
2. FEELING DOWN, DEPRESSED OR HOPELESS: 1
SUM OF ALL RESPONSES TO PHQ QUESTIONS 1-9: 2
SUM OF ALL RESPONSES TO PHQ QUESTIONS 1-9: 2
SUM OF ALL RESPONSES TO PHQ9 QUESTIONS 1 & 2: 2
SUM OF ALL RESPONSES TO PHQ QUESTIONS 1-9: 2

## 2022-09-30 ASSESSMENT — ENCOUNTER SYMPTOMS
RESPIRATORY NEGATIVE: 1
GASTROINTESTINAL NEGATIVE: 1
EYES NEGATIVE: 1
ALLERGIC/IMMUNOLOGIC NEGATIVE: 1

## 2022-09-30 NOTE — PROGRESS NOTES
infection)     chronic UTI's with this pregnancy - last     UTI (urinary tract infection)     Vertigo         Patient Active Problem List   Diagnosis    Cyst of finger    Previous  delivery affecting pregnancy    Periumbilical abdominal pain    Melena    Rectal bleeding    Iron deficiency anemia    Morbid obesity with BMI of 50.0-59.9, adult ([address]    Chronic GERD    Fatty liver    Chronic bilateral low back pain without sciatica    Neoplasm of uncertain behavior of skin of thigh    Graves disease    Foreign body in soft tissue    Mild persistent asthma without complication       Past Surgical History:   Procedure Laterality Date    ARM SURGERY Left 2021    LEFT ARM FOREIGN BODY REMOVAL performed by Todd Moreno MD at [facility]      ,     CHOLECYSTECTOMY      FOOT SURGERY Left 2017    lipoma removed    HERNIA REPAIR N/A     umbilical    HERNIA REPAIR      LAPAROSCOPY  2013    diagnostic    THYROIDECTOMY Bilateral 2020    TOTAL THYROIDECTOMY performed by Todd Moreno MD at [facility]  2012    Gastritis -Dr. Nic Barcenas ENDOSCOPY N/A 2022    EGD BIOPSY performed by Prema Hu MD at [address] [place]. \[facility].\"" ENDOSCOPY       Current Outpatient Medications   Medication Sig Dispense Refill    albuterol sulfate HFA (VENTOLIN HFA) 108 (90 Base) MCG/ACT inhaler Inhale 2 puffs into the lungs 4 times daily as needed for Wheezing 54 g 1    levothyroxine (SYNTHROID) 175 MCG tablet Take 1 tablet by mouth daily       famotidine (PEPCID) 20 MG tablet Take 1 tablet by mouth in the morning and 1 tablet before bedtime.  (Patient not taking: No sig reported) 60 tablet 3    ferrous sulfate (IRON 325) 325 (65 Fe) MG tablet Take 1 tablet by mouth 3 times daily (with meals) (Patient not taking: No sig reported) 30 tablet 5    Prenatal Vit-Fe Fumarate-FA (PRENATAL VITAMINS) 28-0.8 MG TABS Take 1 tablet by mouth daily (any prenatal vitamin covered) (Patient not taking: No sig reported) 30 tablet 11     No current facility-administered medications for this visit. Allergies   Allergen Reactions    Pcn [Penicillins] Hives    Morphine Hives    Gabapentin Rash         Review of Systems   Constitutional: Negative. HENT: Negative. Eyes: Negative. Respiratory: Negative. Cardiovascular: Negative. Gastrointestinal: Negative. Endocrine: Negative. Genitourinary: Negative. Musculoskeletal: Negative. Skin: Negative. Allergic/Immunologic: Negative. Neurological: Negative. Hematological: Negative. Psychiatric/Behavioral: Negative. OBJECTIVE:    /84 (Site: Left Lower Arm, Position: Sitting, Cuff Size: Medium Adult)   Pulse 78   Ht 5' 4\" (1.626 m)   Wt (!) 309 lb 3.2 oz (140.3 kg)   SpO2 97%   BMI 53.07 kg/m²      Physical Exam  Constitutional:       Appearance: Normal appearance. She is obese. HENT:      Head: Normocephalic. Nose: Nose normal.      Mouth/Throat:      Pharynx: Oropharynx is clear. Eyes:      Conjunctiva/sclera: Conjunctivae normal.      Pupils: Pupils are equal, round, and reactive to light. Cardiovascular:      Rate and Rhythm: Normal rate. Pulses: Normal pulses. Pulmonary:      Effort: Pulmonary effort is normal.   Abdominal:      Palpations: Abdomen is soft. Musculoskeletal:         General: Normal range of motion. Cervical back: Normal range of motion. Skin:     General: Skin is warm and dry. Capillary Refill: Capillary refill takes less than 2 seconds. Neurological:      General: No focal deficit present. Mental Status: She is alert and oriented to person, place, and time. Psychiatric:         Mood and Affect: Mood normal.         Behavior: Behavior normal.       ASSESSMENT & PLAN:    1.  Morbid obesity with BMI of 50.0-59.9, adult [facility])  -Patient was encouraged to journal all food intake.   -Keep calorie level at approximately 1200, per discussion / plan with registered dietician.  -Protein intake is to be a minimum of 60 grams per day. -Water drinking was encouraged with a goal of 64oz-128oz daily. Beverages are to be calorie free except for milk. Avoid soda. -Continue to increase level of physical activity. 2. Iron deficiency anemia, unspecified iron deficiency anemia type  - Has stopped supplements currently  - Will monitor post-operatively    3. Pre-op evaluation  - All clearances met  - EGD: superficial gastritis, moderate sliding hernia; negative for Hpylori   - UDS and nicotine testing completed and negative    - Patient is an excellent candidate for bariatric surgery and has completed all necessary requirements of  program and has lost a total of 12.3 lbs throughout the bariatric program. The patient has had all pre-operative clearances completed and will send file to insurance for approval.     - Discussed pre-surgical liver shrinking diet, hospital course, and post op diet stages today with patient in length. Reviewed surgical pictures and all questions answered. Will be scheduled with bariatric surgeon prior to surgery as well, to discuss consent and answer all questions. Patient aware to review new patient binder and come prepared with any further surgical questions. This patient is a female of reproductive age and was advised she should not become pregnant during the bariatric workup process and for at least 12-18 months after surgery. The patient is agreeable to this plan. I spent 25 minutes with the patient face to face today and over 50% of the office visit today was spent in face to face counseling regarding diet and exercise, in preparation for her planned Robotic Sleeve Gastrectomy.     Discussed in length complying with the dietary recommendations, complying with the preoperative workup including dietary counseling, exercise physiologist counseling, and pre-operative optimization of pulmonologist and cardiologist.    The patient expressed understanding and willingness to comply nicely; all questions and concerns addressed. No orders of the defined types were placed in this encounter. No orders of the defined types were placed in this encounter. Follow Up:  Return for send file to insurance.     DADA Sams - CNP

## 2022-10-13 ENCOUNTER — TELEPHONE (OUTPATIENT)
Dept: BARIATRICS/WEIGHT MGMT | Age: 32
End: 2022-10-13

## 2022-10-13 NOTE — TELEPHONE ENCOUNTER
Scheduled sleeve gastrectomy at [facility]  Surgery:  11/14/22 at 930am .. Consent appt:  11/1/22 at 945an  this will include ordered labs that must be done that day. PAT: by phone  Preop diet:  [facility]:  11/11/22 at 830am  P/O appt:  11/22/22 at 1045am  Prep: NPO after midnight. Hold Blood thinners if taking any  Called and spoke with Jimenez John, gave dates and times.

## 2022-10-27 ENCOUNTER — OFFICE VISIT (OUTPATIENT)
Dept: BARIATRICS/WEIGHT MGMT | Age: 32
End: 2022-10-27

## 2022-10-27 VITALS — BODY MASS INDEX: 50.02 KG/M2 | WEIGHT: 293 LBS | HEIGHT: 64 IN

## 2022-10-27 DIAGNOSIS — E66.01 MORBID OBESITY WITH BMI OF 50.0-59.9, ADULT (HCC): Primary | ICD-10-CM

## 2022-10-27 PROCEDURE — 99999 PR OFFICE/OUTPT VISIT,PROCEDURE ONLY: CPT | Performed by: SURGERY

## 2022-10-27 NOTE — PROGRESS NOTES
Nutrition Counseling    REASON FOR VISIT: Pre-Op Diet Education    Chief Complaint:    Chief Complaint   Patient presents with    Weight Management       SUBJECTIVE:  Pt here for pre-op diet education in preparation for sleeve gastrectomy. Instructed on pre-op liver shrinking diet for 2 weeks. Reviewed complete post-op diet progression including tips for N/V, fluid/activity logs and vitamins. Pt voiced understanding to all. The patient is a 34 y.o. female being seen for morbid obesity, considering weight loss surgery; [employer], Height: 5' 4\" (162.6 cm), Weight: (!) 310 lb 8 oz (140.8 kg), Current Body mass index is 53.3 kg/m². The patient's PCP is Harlan Handley MD     Comorbid Conditions:diseases affecting this patient are   Past Medical History:   Diagnosis Date    Abdominal pain     Abnormal uterine bleeding (AUB)     Acute pelvic pain     Anemia 2010    Depression     Foot ulcer, left ([address] 2017    status post nerve decompression repair, excision of neoplasm, left foot, with muscle flap and graft skin application. Graves disease Dx: 2/2019    Dr. Marcial Huff disease     History of blood transfusion     Hypermenorrhea     Irregular menses     Liver disease     Menorrhagia     Mild persistent asthma without complication 91/22/8391    Morbid obesity ([address]     Nexplanon removal     Thyroid disorder     UTI (lower urinary tract infection)     chronic UTI's with this pregnancy - last 2014    UTI (urinary tract infection)     Vertigo    . Review of Systems - Review of Systems  Otherwise per HPI. Allergies:   Allergies   Allergen Reactions    Pcn [Penicillins] Hives    Morphine Hives    Gabapentin Rash       Past Surgical History:  Past Surgical History:   Procedure Laterality Date    ARM SURGERY Left 08/31/2021    LEFT ARM FOREIGN BODY REMOVAL performed by Kathleen Quiroz MD at [address] [place] [address]      2010, 2014    CHOLECYSTECTOMY  2010    FOOT SURGERY Left 2017    lipoma removed    HERNIA REPAIR N/A     umbilical    HERNIA REPAIR  2013    LAPAROSCOPY  2013    diagnostic    THYROIDECTOMY Bilateral 2020    TOTAL THYROIDECTOMY performed by Mortimer Cross, MD at [address]  2012    Gastritis -Dr. Wolff Para N/A 2022    EGD BIOPSY performed by Luisa Mcfadden MD at [facility] ENDOSCOPY       Family History:  Family History   Problem Relation Age of Onset    Thyroid Disease Sister     Thyroid Disease Paternal Aunt     Cancer Maternal Grandmother     Cancer Paternal Grandmother     Diabetes Mother     Heart Disease Mother     No Known Problems Father        Social History:  Social History     Socioeconomic History    Marital status: Single     Spouse name: Not on file    Number of children: Not on file    Years of education: Not on file    Highest education level: Not on file   Occupational History    Not on file   Tobacco Use    Smoking status: Former     Packs/day: 0.10     Years: 1.00     Pack years: 0.10     Types: Cigarettes     Start date:      Quit date: 2013     Years since quittin.8    Smokeless tobacco: Never   Vaping Use    Vaping Use: Never used   Substance and Sexual Activity    Alcohol use: No    Drug use: No     Types: Marijuana (Kenyatta Brian)     Comment: quit in     Sexual activity: Yes     Partners: Male   Other Topics Concern    Not on file   Social History Narrative    Not on file     Social Determinants of Health     Financial Resource Strain: Not on file   Food Insecurity: Not on file   Transportation Needs: Not on file   Physical Activity: Not on file   Stress: Not on file   Social Connections: Not on file   Intimate Partner Violence: Not on file   Housing Stability: Not on file         OBJECTIVE:  Physical Exam   Ht 5' 4\" (1.626 m)   Wt (!) 310 lb 8 oz (140.8 kg)   BMI 53.30 kg/m²        NUTRITION DIAGNOSIS: Overweight / Obesity   Problem: Increased adiposity compared to reference standard or established norms   Etiology: Excess intake compared to output over time   S/S: Ht: 5' 4\" Wt: 310 lb 8 oz BMI: 53.3    NUTRITION INTERVENTIONS:    Individualized treatment goals to address nutrition diagnosis:  Instructed on pre and post-op diets for sleeve gastrectomy  Provided recipe book, N/V handout, fluid/activity logs, vitamin handout        Encouraged record keeping and physical activity as approved by physician     MONITORING/ EVALUATION/ PLAN:   Pt verbalized understanding of all materials covered   Pt asked pertinent questions throughout the session - expect compliance with nutrition guidelines presented   Provided pt with contact information should questions arise prior to next visit   Will f/u with pt prn    Corey Clinton, RD, LD

## 2022-11-01 ENCOUNTER — OFFICE VISIT (OUTPATIENT)
Dept: BARIATRICS/WEIGHT MGMT | Age: 32
End: 2022-11-01
Payer: COMMERCIAL

## 2022-11-01 VITALS
WEIGHT: 293 LBS | SYSTOLIC BLOOD PRESSURE: 120 MMHG | BODY MASS INDEX: 50.02 KG/M2 | HEART RATE: 58 BPM | DIASTOLIC BLOOD PRESSURE: 84 MMHG | OXYGEN SATURATION: 97 % | HEIGHT: 64 IN

## 2022-11-01 DIAGNOSIS — E66.01 MORBID OBESITY WITH BMI OF 50.0-59.9, ADULT (HCC): Primary | ICD-10-CM

## 2022-11-01 DIAGNOSIS — Z01.818 PRE-OPERATIVE CLEARANCE: ICD-10-CM

## 2022-11-01 DIAGNOSIS — E61.1 IRON DEFICIENCY: ICD-10-CM

## 2022-11-01 PROCEDURE — 99214 OFFICE O/P EST MOD 30 MIN: CPT | Performed by: SURGERY

## 2022-11-01 PROCEDURE — G8417 CALC BMI ABV UP PARAM F/U: HCPCS | Performed by: SURGERY

## 2022-11-01 PROCEDURE — G8484 FLU IMMUNIZE NO ADMIN: HCPCS | Performed by: SURGERY

## 2022-11-01 PROCEDURE — G8427 DOCREV CUR MEDS BY ELIG CLIN: HCPCS | Performed by: SURGERY

## 2022-11-01 PROCEDURE — 1036F TOBACCO NON-USER: CPT | Performed by: SURGERY

## 2022-11-01 RX ORDER — SODIUM CHLORIDE 0.9 % (FLUSH) 0.9 %
5-40 SYRINGE (ML) INJECTION EVERY 12 HOURS SCHEDULED
Status: CANCELLED | OUTPATIENT
Start: 2022-11-01

## 2022-11-01 RX ORDER — HEPARIN SODIUM 5000 [USP'U]/ML
5000 INJECTION, SOLUTION INTRAVENOUS; SUBCUTANEOUS ONCE
Status: CANCELLED | OUTPATIENT
Start: 2022-11-01 | End: 2022-11-01

## 2022-11-01 RX ORDER — SODIUM CHLORIDE 0.9 % (FLUSH) 0.9 %
5-40 SYRINGE (ML) INJECTION PRN
Status: CANCELLED | OUTPATIENT
Start: 2022-11-01

## 2022-11-01 RX ORDER — SCOLOPAMINE TRANSDERMAL SYSTEM 1 MG/1
1 PATCH, EXTENDED RELEASE TRANSDERMAL ONCE
Status: CANCELLED | OUTPATIENT
Start: 2022-11-01 | End: 2022-11-01

## 2022-11-01 RX ORDER — SODIUM CHLORIDE, SODIUM LACTATE, POTASSIUM CHLORIDE, CALCIUM CHLORIDE 600; 310; 30; 20 MG/100ML; MG/100ML; MG/100ML; MG/100ML
INJECTION, SOLUTION INTRAVENOUS CONTINUOUS
Status: CANCELLED | OUTPATIENT
Start: 2022-11-01

## 2022-11-01 RX ORDER — ONDANSETRON 2 MG/ML
4 INJECTION INTRAMUSCULAR; INTRAVENOUS ONCE
Status: CANCELLED | OUTPATIENT
Start: 2022-11-01 | End: 2022-11-01

## 2022-11-01 RX ORDER — SODIUM CHLORIDE 9 MG/ML
INJECTION, SOLUTION INTRAVENOUS PRN
Status: CANCELLED | OUTPATIENT
Start: 2022-11-01

## 2022-11-01 NOTE — PROGRESS NOTES
BARIATRIC SURGERY OFFICE PROGRESS NOTE    SUBJECTIVE:    Patient presenting today referred from Zoya Rangel MD, for   Chief Complaint   Patient presents with    Weight Management     Consent [id number] care source auth #[id number]   . Vitals:    11/01/22 0946   BP: 120/84   Pulse: 58   SpO2: 97%        BMI: Body mass index is 52.58 kg/m². Weight History: Wt Readings from Last 3 Encounters:   11/01/22 (!) 306 lb 4.8 oz (138.9 kg)   10/27/22 (!) 310 lb 8 oz (140.8 kg)   09/30/22 (!) 309 lb 3.2 oz (140.3 kg)       If within 30 days of bariatric surgery date, have you been to the ED: Lucia Fuentes is a 34 y.o. female presenting in  consent  bariatric PRE-OP visit. Weight change:     -4.2 lbs since last visit. N/a lbs since surgery.    -15.2 lbs since starting program.    Changes in health since last visit: Started liver shrinking diet. Got  last Saturday. Otherwise no new changes. Pre-op clearances completed: Yes. Pt tracking calories/protein: Yes. Approximately 900-1200 lilliana / d. Approximately <100 g protein / d. Pt exercising: Walking at home. Pt taking vitamins: Yes. Fluid intake: Appropriate. Past Medical History:   Diagnosis Date    Abdominal pain     Abnormal uterine bleeding (AUB)     Acute pelvic pain     Anemia 2010    Depression     Foot ulcer, left ([facility] [address] 2017    status post nerve decompression repair, excision of neoplasm, left foot, with muscle flap and graft skin application.     Graves disease Dx: 2/2019    Dr. Sheron Calles disease     History of blood transfusion     Hypermenorrhea     Irregular menses     Liver disease     Menorrhagia     Mild persistent asthma without complication 46/24/7716    Morbid obesity ([address]     Nexplanon removal     Thyroid disorder     UTI (lower urinary tract infection)     chronic UTI's with this pregnancy - last 2014    UTI (urinary tract infection)     Vertigo         Patient Active Problem List   Diagnosis    Cyst of finger    Previous  delivery affecting pregnancy    Periumbilical abdominal pain    Melena    Rectal bleeding    Iron deficiency anemia    Morbid obesity with BMI of 50.0-59.9, adult ([facility])    Chronic GERD    Fatty liver    Chronic bilateral low back pain without sciatica    Neoplasm of uncertain behavior of skin of thigh    Graves disease    Foreign body in soft tissue    Mild persistent asthma without complication       Past Surgical History:   Procedure Laterality Date    ARM SURGERY Left 2021    LEFT ARM FOREIGN BODY REMOVAL performed by Alena Kowalski MD at [facility]      ,     CHOLECYSTECTOMY  2010    FOOT SURGERY Left 2017    lipoma removed    HERNIA REPAIR N/A     umbilical    HERNIA REPAIR  2013    LAPAROSCOPY  2013    diagnostic    THYROIDECTOMY Bilateral 2020    TOTAL THYROIDECTOMY performed by Alena Kowalski MD at [facility]  2012    Gastritis -Dr. Tori Albarado ENDOSCOPY N/A 2022    EGD BIOPSY performed by Therese Carrel, MD at [facility] ENDOSCOPY       Current Outpatient Medications   Medication Sig Dispense Refill    levothyroxine (SYNTHROID) 175 MCG tablet Take 1 tablet by mouth daily       famotidine (PEPCID) 20 MG tablet Take 1 tablet by mouth in the morning and 1 tablet before bedtime.  (Patient not taking: No sig reported) 60 tablet 3    albuterol sulfate HFA (VENTOLIN HFA) 108 (90 Base) MCG/ACT inhaler Inhale 2 puffs into the lungs 4 times daily as needed for Wheezing (Patient not taking: Reported on 2022) 54 g 1    ferrous sulfate (IRON 325) 325 (65 Fe) MG tablet Take 1 tablet by mouth 3 times daily (with meals) (Patient not taking: No sig reported) 30 tablet 5    Prenatal Vit-Fe Fumarate-FA (PRENATAL VITAMINS) 28-0.8 MG TABS Take 1 tablet by mouth daily (any prenatal vitamin covered) (Patient not taking: No sig reported) 30 tablet 11     No current facility-administered medications for this visit. Allergies   Allergen Reactions    Pcn [Penicillins] Hives    Morphine Hives    Gabapentin Rash         Review of Systems   Musculoskeletal:  Positive for arthralgias. All other systems reviewed and are negative. OBJECTIVE:    /84 (Site: Right Upper Arm, Position: Sitting, Cuff Size: Large Adult)   Pulse 58   Ht 5' 4\" (1.626 m)   Wt (!) 306 lb 4.8 oz (138.9 kg)   SpO2 97%   BMI 52.58 kg/m²      Physical Exam  Vitals reviewed. Constitutional:       General: She is not in acute distress. Appearance: She is obese. She is not ill-appearing, toxic-appearing or diaphoretic. HENT:      Head: Normocephalic and atraumatic. Right Ear: External ear normal.      Left Ear: External ear normal.      Nose: Nose normal.   Eyes:      General:         Right eye: No discharge. Left eye: No discharge. Extraocular Movements: Extraocular movements intact. Cardiovascular:      Rate and Rhythm: Normal rate. Pulmonary:      Effort: No respiratory distress. Abdominal:      Palpations: Abdomen is soft. Tenderness: There is no abdominal tenderness. Musculoskeletal:         General: No swelling. Skin:     General: Skin is warm. Neurological:      General: No focal deficit present. Mental Status: She is alert. ASSESSMENT & PLAN:    1. Morbid obesity with BMI of 50.0-59.9, adult ([facility] [address] [address]  -Cleared for surgery.   -Liver shrinking diet.   -Consent obtained. Reviewed in detail with the patient and/or family the expected pre-operative, operative, and post-operative courses including risks, benefits, and alternatives to the procedure. The patient's questions were answered in detail and agreed to proceed with the procedure.   -Showed pt diagram of surgery and d/w pt procedure specifics related to staple line, anatomy, etc.   -Orders placed    2. Pre-operative clearance    - Hemoglobin A1C; Future  - Pregnancy, Urine; Future    3.  Iron deficiency  -Iron 325 PO daily   -Monitor             As of current visit, regarding obesity-related co-morbid conditions:  ERIC [] compliant [] no longer using [] resolved per sleep study; hypertension [] medications; hyperlipidemia [] medications; GERD [] medications; DM [] insulin [] non-insulin [] no meds       Patient was encouraged to track all PO intake. Calories should be approximately 1200, per discussion and plan with registered dietician. Protein intake is to be a minimum of 40-50 grams per day. Water drinking was encouraged with a goal of 64oz-128oz daily. Beverages are to be calorie free except for milk. Avoid soda and other carbonated beverages. Continue to increase level of physical activity. I spent 25 minutes with the patient face to face today and over 50% of the office visit today was spent in face to face counseling regarding diet and exercise, in preparation for her planned robotic-assisted laparoscopic sleeve gastrectomy. Discussed complying with the dietary recommendations, complying with the preoperative workup including dietary counseling  exercise physiologist (PT) counseling , and pre-operative optimization of pulmonologist  and cardiologist .    The patient expressed understanding and willingness to comply nicely; all questions and concerns addressed. No orders of the defined types were placed in this encounter. Orders Placed This Encounter   Procedures    Hemoglobin A1C     Standing Status:   Future     Standing Expiration Date:   11/1/2023    Pregnancy, Urine     Standing Status:   Future     Standing Expiration Date:   11/1/2023    Initiate PAT Protocol     Standing Status:   Future     Standing Expiration Date:   12/31/2022       Follow Up:  No follow-ups on file.     Cole Nicole MD

## 2022-11-04 NOTE — PROGRESS NOTES
.Surgery @ [facility] on 11/14/22 you will be called 11/11/22 with times               1. Do not eat or drink anything after midnight - unless instructed by your doctor prior to surgery. This includes                   no water, chewing gum or mints. 2. Follow your directions as prescribed by the doctor for your procedure and medications. Use inhaler,take Synthroid with sips of water. 3. Check with your Doctor regarding stopping vitamins, supplements, blood thinners and follow their instructions. Stop vitamins, supplements and NSAIDS: 11/07/22   4. Do not smoke, vape or use chewing tobacco morning of surgery. Do not drink any alcoholic beverages 24 hours prior to surgery. This includes NA Beer. No street drugs 7 days prior to surgery. 5. You may brush your teeth and gargle the morning of surgery. DO NOT SWALLOW WATER   6. You MUST make arrangements for a responsible adult to take you home after your surgery and be able to check on you every couple                   hours for the day. You will not be allowed to leave alone or drive yourself home. It is strongly suggested someone stay with you the first 24                   hrs. Your surgery will be cancelled if you do not have a ride home. 7. Please wear simple, loose fitting clothing to the hospital.  Bill Conte not bring valuables (money, credit cards, checkbooks, etc.) Do not wear any                   makeup (including no eye makeup) or nail polish on your fingers or toes. 8. DO NOT wear any jewelry or piercings on day of surgery. All body piercing jewelry must be removed. 9. If you have dentures, they will be removed before going to the OR; we will provide you a container. If you wear contact lenses or glasses,                  they will be removed; please bring a case for them. 10. If you  have a Living Will and Durable Power of  for Healthcare, please bring in a copy.            11. Please bring picture ID,  insurance card, paperwork from the doctors office    (H & P, Consent, & card for implantable devices). 12. Take a shower the morning of your procedure with Hibiclens or an anti-bacterial soap. Do not apply any make-up, deodorant, lotion, oil or powder. 15.  Enter thru the main entrance on the day of surgery.

## 2022-11-10 ENCOUNTER — HOSPITAL ENCOUNTER (OUTPATIENT)
Age: 32
Discharge: HOME | End: 2022-11-10
Payer: COMMERCIAL

## 2022-11-10 LAB
ESTIMATED AVERAGE GLUCOSE: 123 MG/DL
HBA1C MFR BLD: 5.9 % (ref 4.2–6.3)
INTERPRETATION: NORMAL
PREGNANCY, URINE: NEGATIVE
SPECIFIC GRAVITY, URINE: 1.02 (ref 1–1.03)
T4 FREE: 1.16 NG/DL (ref 0.9–1.8)
TSH HIGH SENSITIVITY: 0.93 UIU/ML (ref 0.27–4.2)

## 2022-11-10 PROCEDURE — 84443 ASSAY THYROID STIM HORMONE: CPT

## 2022-11-10 PROCEDURE — 83036 HEMOGLOBIN GLYCOSYLATED A1C: CPT

## 2022-11-10 PROCEDURE — 81025 URINE PREGNANCY TEST: CPT

## 2022-11-10 PROCEDURE — 36415 COLL VENOUS BLD VENIPUNCTURE: CPT

## 2022-11-10 PROCEDURE — 84439 ASSAY OF FREE THYROXINE: CPT

## 2022-11-11 ENCOUNTER — ANESTHESIA EVENT (OUTPATIENT)
Dept: OPERATING ROOM | Age: 32
DRG: 403 | End: 2022-11-11
Payer: COMMERCIAL

## 2022-11-11 ENCOUNTER — OFFICE VISIT (OUTPATIENT)
Dept: BARIATRICS/WEIGHT MGMT | Age: 32
End: 2022-11-11
Payer: COMMERCIAL

## 2022-11-11 VITALS
HEIGHT: 64 IN | WEIGHT: 293 LBS | HEART RATE: 90 BPM | SYSTOLIC BLOOD PRESSURE: 132 MMHG | DIASTOLIC BLOOD PRESSURE: 80 MMHG | BODY MASS INDEX: 50.02 KG/M2 | OXYGEN SATURATION: 99 %

## 2022-11-11 DIAGNOSIS — E61.1 IRON DEFICIENCY: ICD-10-CM

## 2022-11-11 DIAGNOSIS — E66.01 MORBID OBESITY WITH BMI OF 50.0-59.9, ADULT (HCC): Primary | ICD-10-CM

## 2022-11-11 DIAGNOSIS — Z01.818 PRE-OP EVALUATION: ICD-10-CM

## 2022-11-11 PROCEDURE — G8484 FLU IMMUNIZE NO ADMIN: HCPCS | Performed by: NURSE PRACTITIONER

## 2022-11-11 PROCEDURE — G8427 DOCREV CUR MEDS BY ELIG CLIN: HCPCS | Performed by: NURSE PRACTITIONER

## 2022-11-11 PROCEDURE — G8417 CALC BMI ABV UP PARAM F/U: HCPCS | Performed by: NURSE PRACTITIONER

## 2022-11-11 PROCEDURE — 1036F TOBACCO NON-USER: CPT | Performed by: NURSE PRACTITIONER

## 2022-11-11 PROCEDURE — 99213 OFFICE O/P EST LOW 20 MIN: CPT | Performed by: NURSE PRACTITIONER

## 2022-11-11 ASSESSMENT — PATIENT HEALTH QUESTIONNAIRE - PHQ9
SUM OF ALL RESPONSES TO PHQ QUESTIONS 1-9: 0
SUM OF ALL RESPONSES TO PHQ QUESTIONS 1-9: 0
1. LITTLE INTEREST OR PLEASURE IN DOING THINGS: 0
SUM OF ALL RESPONSES TO PHQ QUESTIONS 1-9: 0
SUM OF ALL RESPONSES TO PHQ QUESTIONS 1-9: 0
SUM OF ALL RESPONSES TO PHQ9 QUESTIONS 1 & 2: 0
2. FEELING DOWN, DEPRESSED OR HOPELESS: 0

## 2022-11-11 NOTE — PROGRESS NOTES
Juani Michael Issac  1990  34 y.o. Mariola An is here for pre op weigh in. Patient was started on liver shrinking diet for two weeks ago resulting in weight loss of 4.6  pounds  and 15.6 pounds overall. Current Body mass index is Body mass index is 52.51 kg/m². Ariana Urban BMI started at 55.19    - All labs reviewed and questions answered. - Aware to continue liver shrinking diet until night before surgery and educated on post op diet stages. - Covid test on no longer necessary    SUBJECT RADHA:    Chief Complaint   Patient presents with    Weight Management     Weigh in, [address] 11/14/2022     Past Medical History:   Diagnosis Date    Abdominal pain     Abnormal uterine bleeding (AUB)     Acute pelvic pain     Anemia 2010    Depression     Foot ulcer, left ([address] 2017    status post nerve decompression repair, excision of neoplasm, left foot, with muscle flap and graft skin application.     Graves disease Dx: 2/2019    Dr. Lise Reyes disease     History of blood transfusion     Hypermenorrhea     Irregular menses     Liver disease     Menorrhagia     Mild persistent asthma without complication 64/71/3312    Morbid obesity ([address]     Nexplanon removal     Thyroid disorder     UTI (lower urinary tract infection)     chronic UTI's with this pregnancy - last 2014    UTI (urinary tract infection)     Vertigo      Past Surgical History:   Procedure Laterality Date    ARM SURGERY Left 08/31/2021    LEFT ARM FOREIGN BODY REMOVAL performed by Chester Briceño MD at [address] [place] [address]      2010, 2014    CHOLECYSTECTOMY  2010    FOOT SURGERY Left 2017    lipoma removed    HERNIA REPAIR N/A     umbilical    HERNIA REPAIR  2013    LAPAROSCOPY  12/21/2013    diagnostic    THYROIDECTOMY Bilateral 03/03/2020    TOTAL [facility]Y [facility]  07/24/2012    Gastritis -Dr. Pam Lyon N/A 8/12/2022    EGD BIOPSY performed by Mary Alvarez Andi Warner MD at [facility] ENDOSCOPY     Current Outpatient Medications   Medication Sig Dispense Refill    albuterol sulfate HFA (VENTOLIN HFA) 108 (90 Base) MCG/ACT inhaler Inhale 2 puffs into the lungs 4 times daily as needed for Wheezing 54 g 1    levothyroxine (SYNTHROID) 175 MCG tablet Take 1 tablet by mouth daily       famotidine (PEPCID) 20 MG tablet Take 1 tablet by mouth in the morning and 1 tablet before bedtime. (Patient not taking: No sig reported) 60 tablet 3    ferrous sulfate (IRON 325) 325 (65 Fe) MG tablet Take 1 tablet by mouth 3 times daily (with meals) (Patient not taking: No sig reported) 30 tablet 5    Prenatal Vit-Fe Fumarate-FA (PRENATAL VITAMINS) 28-0.8 MG TABS Take 1 tablet by mouth daily (any prenatal vitamin covered) (Patient not taking: No sig reported) 30 tablet 11     No current facility-administered medications for this visit. Family History   Problem Relation Age of Onset    Thyroid Disease Sister     Thyroid Disease Paternal Aunt     Cancer Maternal Grandmother     Cancer Paternal Grandmother     Diabetes Mother     Heart Disease Mother     No Known Problems Father      Allergies   Allergen Reactions    Pcn [Penicillins] Hives    Morphine Hives    Gabapentin Rash        Review of Systems   All other systems reviewed and are negative. OBJECTIVE:     /80 (Site: Left Upper Arm, Position: Sitting, Cuff Size: Medium Adult)   Pulse 90   Ht 5' 4\" (1.626 m)   Wt (!) 305 lb 14.4 oz (138.8 kg)   LMP 10/29/2022 (Exact Date)   SpO2 99%   BMI 52.51 kg/m²   Wt Readings from Last 3 Encounters:   11/11/22 (!) 305 lb 14.4 oz (138.8 kg)   11/01/22 (!) 306 lb 4.8 oz (138.9 kg)   10/27/22 (!) 310 lb 8 oz (140.8 kg)       Physical Exam  Constitutional:       Appearance: Normal appearance. She is obese. HENT:      Head: Normocephalic. Nose: Nose normal.      Mouth/Throat:      Pharynx: Oropharynx is clear.    Eyes:      Conjunctiva/sclera: Conjunctivae normal.      Pupils: Pupils are equal, round, and reactive to light. Cardiovascular:      Rate and Rhythm: Normal rate. Pulses: Normal pulses. Pulmonary:      Effort: Pulmonary effort is normal.   Abdominal:      Palpations: Abdomen is soft. Musculoskeletal:         General: Normal range of motion. Cervical back: Normal range of motion. Skin:     General: Skin is warm and dry. Capillary Refill: Capillary refill takes less than 2 seconds. Neurological:      General: No focal deficit present. Mental Status: She is alert and oriented to person, place, and time. Psychiatric:         Mood and Affect: Mood normal.         Behavior: Behavior normal.         ASSESSMENT/ PLAN:    1. Morbid obesity with BMI of 50.0-59.9, adult ([facility]  - Continue with weight loss surgery    2. Iron deficiency  - Stopped taking supplements  - Will monitor post-operatively    3. Pre-op evaluation  - Meets all criteria for bariatric surgery  - Scheduled for planned robot assisted sleeve gastrectomy with possible hiatal hernia on 11/14/22.   - Discussed pre surgical drink and to drink it at 8 pm the night before surgery  - NPO after midnight on the night before surgery  - Did well on 2 week pre op diet; down -4.6 lbs since starting and --15.6 overall  - BMI 52.51 now and started at 55.19.   - Labs reviewed. - Discussed surgery and post op course along with diet stages following surgery. - Discharge checklist provided and reviewed  - Questions answered  - Patient voiced understanding  - Call with any questions or concerns. No orders of the defined types were placed in this encounter. Return for initial post op visit.     DADA Umana - CNP

## 2022-11-11 NOTE — PROGRESS NOTES
Patient notified of surgery time at [facility] 11/14/2022 0930 with arrival at 0730, understanding verbalized

## 2022-11-11 NOTE — ANESTHESIA PRE PROCEDURE
Department of Anesthesiology  Preprocedure Note       Name:  Angela Allen   Age:  34 y.o.  :  1990                                          MRN:  [id number]         Date:  2022      Surgeon: Belem Gentile):  MD Rishi Barragan II, MD    Procedure: Procedure(s):  GASTRECTOMY SLEEVE LAPAROSCOPIC ROBOTIC POSS HIATAL HERNIA REPAIR  EGD BIOPSY    Medications prior to admission:   Prior to Admission medications    Medication Sig Start Date End Date Taking? Authorizing Provider   famotidine (PEPCID) 20 MG tablet Take 1 tablet by mouth in the morning and 1 tablet before bedtime. Patient not taking: No sig reported 22   Rishi Estrella II, MD   albuterol sulfate HFA (VENTOLIN HFA) 108 (90 Base) MCG/ACT inhaler Inhale 2 puffs into the lungs 4 times daily as needed for Wheezing 22   Lam Neal MD   ferrous sulfate (IRON 325) 325 (65 Fe) MG tablet Take 1 tablet by mouth 3 times daily (with meals)  Patient not taking: No sig reported 22   DADA Shah - CNP   Prenatal Vit-Fe Fumarate-FA (PRENATAL VITAMINS) 28-0.8 MG TABS Take 1 tablet by mouth daily (any prenatal vitamin covered)  Patient not taking: No sig reported 1/10/22 4/13/22  Franky Avila MD   levothyroxine (SYNTHROID) 175 MCG tablet Take 1 tablet by mouth daily  3/9/20   Historical Provider, MD       Current medications:    No current facility-administered medications for this encounter. Current Outpatient Medications   Medication Sig Dispense Refill    famotidine (PEPCID) 20 MG tablet Take 1 tablet by mouth in the morning and 1 tablet before bedtime.  (Patient not taking: No sig reported) 60 tablet 3    albuterol sulfate HFA (VENTOLIN HFA) 108 (90 Base) MCG/ACT inhaler Inhale 2 puffs into the lungs 4 times daily as needed for Wheezing 54 g 1    ferrous sulfate (IRON 325) 325 (65 Fe) MG tablet Take 1 tablet by mouth 3 times daily (with meals) (Patient not taking: No sig reported) 30 tablet 5  Prenatal Vit-Fe Fumarate-FA (PRENATAL VITAMINS) 28-0.8 MG TABS Take 1 tablet by mouth daily (any prenatal vitamin covered) (Patient not taking: No sig reported) 30 tablet 11    levothyroxine (SYNTHROID) 175 MCG tablet Take 1 tablet by mouth daily          Allergies: Allergies   Allergen Reactions    Pcn [Penicillins] Hives    Morphine Hives    Gabapentin Rash       Problem List:    Patient Active Problem List   Diagnosis Code    Cyst of finger [id number]    Previous  delivery affecting pregnancy F16.136    Periumbilical abdominal pain R10.33    Melena K92.1    Rectal bleeding K62.5    Iron deficiency anemia D50.9    Morbid obesity with BMI of 50.0-59.9, adult ([facility] E66.01, Z68.43    Chronic GERD K21.9    Fatty liver K76.0    Chronic bilateral low back pain without sciatica M54.50, G89.29    Neoplasm of uncertain behavior of skin of thigh D48.5    Graves disease E05.00    Foreign body in soft tissue M79.5    Mild persistent asthma without complication J14.51       Past Medical History:        Diagnosis Date    Abdominal pain     Abnormal uterine bleeding (AUB)     Acute pelvic pain     Anemia 2010    Depression     Foot ulcer, left ([facility] 2017    status post nerve decompression repair, excision of neoplasm, left foot, with muscle flap and graft skin application.     Graves disease Dx: 2019    Dr. Philly Lowry disease     History of blood transfusion     Hypermenorrhea     Irregular menses     Liver disease     Menorrhagia     Mild persistent asthma without complication     Morbid obesity ([address]     Nexplanon removal     Thyroid disorder     UTI (lower urinary tract infection)     chronic UTI's with this pregnancy - last     UTI (urinary tract infection)     Vertigo        Past Surgical History:        Procedure Laterality Date    ARM SURGERY Left 2021    LEFT ARM FOREIGN BODY REMOVAL performed by Landen Tyler MD at [address] [place] [address] ,     CHOLECYSTECTOMY  2010    FOOT SURGERY Left 2017    lipoma removed    HERNIA REPAIR N/A     umbilical    HERNIA REPAIR  2013    LAPAROSCOPY  2013    diagnostic    THYROIDECTOMY Bilateral 2020    TOTAL THYROIDECTOMY performed by Paola Krause MD at [facility]  2012    Gastritis -Dr. Slick Hook N/A 2022    EGD BIOPSY performed by Claribel Barrett MD at [facility] ENDOSCOPY       Social History:    Social History     Tobacco Use    Smoking status: Former     Packs/day: 0.10     Years: 1.00     Pack years: 0.10     Types: Cigarettes     Start date:      Quit date: 2013     Years since quittin.9    Smokeless tobacco: Never   Substance Use Topics    Alcohol use: No                                Counseling given: Not Answered      Vital Signs (Current):   Vitals:    22 0830   Weight: (!) 306 lb (138.8 kg)   Height: 5' 4\" (1.626 m)                                              BP Readings from Last 3 Encounters:   22 132/80   22 120/84   22 138/84       NPO Status:                                                                                 BMI:   Wt Readings from Last 3 Encounters:   22 (!) 305 lb 14.4 oz (138.8 kg)   22 (!) 306 lb 4.8 oz (138.9 kg)   10/27/22 (!) 310 lb 8 oz (140.8 kg)     Body mass index is 52.52 kg/m².     CBC:   Lab Results   Component Value Date/Time    WBC 13.5 2022 08:42 AM    RBC 5.18 2022 08:42 AM    HGB 11.6 2022 08:42 AM    HCT 41.0 2022 08:42 AM    MCV 79.2 2022 08:42 AM    RDW 16.8 2022 08:42 AM     2022 08:42 AM       CMP:   Lab Results   Component Value Date/Time     2022 08:42 AM    K 4.2 2022 08:42 AM     2022 08:42 AM    CO2 25 2022 08:42 AM    BUN 9 2022 08:42 AM    CREATININE 0.7 2022 08:42 AM    GFRAA >60 2022 08:42 AM LABGLOM >60 04/08/2022 08:42 AM    GLUCOSE 127 04/08/2022 08:42 AM    PROT 7.5 04/08/2022 08:42 AM    PROT 8.2 07/11/2012 08:20 PM    CALCIUM 8.7 04/08/2022 08:42 AM    BILITOT 0.6 04/08/2022 08:42 AM    ALKPHOS 79 04/08/2022 08:42 AM    AST 27 04/08/2022 08:42 AM    ALT 29 04/08/2022 08:42 AM       POC Tests: No results for input(s): POCGLU, POCNA, POCK, POCCL, POCBUN, POCHEMO, POCHCT in the last 72 hours. Coags:   Lab Results   Component Value Date/Time    PROTIME 11.7 03/25/2014 04:35 PM    INR 1.08 03/25/2014 04:35 PM    APTT 30.1 03/25/2014 04:35 PM       HCG (If Applicable):   Lab Results   Component Value Date    PREGTESTUR NEGATIVE 11/10/2022        ABGs: No results found for: PHART, PO2ART, UUC3CWF, NHJ2OZG, BEART, X5YNIXNO     Type & Screen (If Applicable):  No results found for: LABABO, LABRH    Drug/Infectious Status (If Applicable):  No results found for: HIV, HEPCAB    COVID-19 Screening (If Applicable):   Lab Results   Component Value Date/Time    COVID19 NOT DETECTED 08/25/2021 12:00 PM           Anesthesia Evaluation  Patient summary reviewed no history of anesthetic complications:   Airway: Mallampati: III  TM distance: >3 FB   Neck ROM: full  Mouth opening: > = 3 FB   Dental:    (+) upper dentures      Pulmonary: breath sounds clear to auscultation  (+) asthma:                           ROS comment: Former smoker   Cardiovascular:  Exercise tolerance: good (>4 METS),           Rhythm: regular  Rate: normal           Beta Blocker:  Not on Beta Blocker      ROS comment: Echo 8/2022:  Summary   Left ventricular function and size is normal, EF is estimated at 55-60%. Mild left ventricular hypertrophy. Grade I diastolic dysfunction. No regional wall motion abnormalities were detected. Mild pulmonic and tricuspid regurgitation present. RVSP is 30 mmHg. No evidence of pericardial effusion.     Stress test 7/2022:  Summary   ECG portion of stress test is negative for ischemia by diagnostic criteria. Completed 4.10 Mins of exercise and 6 METS , Peak Bp was 158/78   Reduced exercise capacity   Lowery Score of 4 ( moderate risk)     Neuro/Psych:   (+) depression/anxiety             GI/Hepatic/Renal:   (+) GERD:, liver disease (LEIJA):,          ROS comment: Fatty liver. Endo/Other:    (+) hypothyroidism::., .                 Abdominal:             Vascular: negative vascular ROS. Other Findings:           Anesthesia Plan      general     ASA 4       Induction: intravenous. MIPS: Postoperative opioids intended and Prophylactic antiemetics administered. Anesthetic plan and risks discussed with patient. Plan discussed with CRNA. Pre Anesthesia Evaluation complete. Anesthesia plan, risks, benefits, alternatives, and personal involved discussed with patient. Patients and/or legal guardian verbalized an understanding  and agreed to proceed.   Sal Siegel DO  11/14/2022 Chest pain

## 2022-11-14 ENCOUNTER — ANESTHESIA (OUTPATIENT)
Dept: OPERATING ROOM | Age: 32
DRG: 403 | End: 2022-11-14
Payer: COMMERCIAL

## 2022-11-14 ENCOUNTER — HOSPITAL ENCOUNTER (INPATIENT)
Age: 32
LOS: 1 days | Discharge: HOME | DRG: 403 | End: 2022-11-15
Attending: SURGERY | Admitting: SURGERY
Payer: COMMERCIAL

## 2022-11-14 DIAGNOSIS — E66.01 MORBID OBESITY (HCC): ICD-10-CM

## 2022-11-14 LAB
BASOPHILS ABSOLUTE: 0.1 K/CU MM
BASOPHILS RELATIVE PERCENT: 0.5 % (ref 0–1)
DIFFERENTIAL TYPE: ABNORMAL
EOSINOPHILS ABSOLUTE: 0.2 K/CU MM
EOSINOPHILS RELATIVE PERCENT: 1.6 % (ref 0–3)
GLUCOSE BLD-MCNC: 168 MG/DL (ref 70–99)
HCT VFR BLD CALC: 38.1 % (ref 37–47)
HEMOGLOBIN: 11.1 GM/DL (ref 12.5–16)
IMMATURE NEUTROPHIL %: 0.5 % (ref 0–0.43)
LYMPHOCYTES ABSOLUTE: 3.3 K/CU MM
LYMPHOCYTES RELATIVE PERCENT: 24.9 % (ref 24–44)
MCH RBC QN AUTO: 22.1 PG (ref 27–31)
MCHC RBC AUTO-ENTMCNC: 29.1 % (ref 32–36)
MCV RBC AUTO: 75.7 FL (ref 78–100)
MONOCYTES ABSOLUTE: 0.6 K/CU MM
MONOCYTES RELATIVE PERCENT: 4.5 % (ref 0–4)
NUCLEATED RBC %: 0 %
PDW BLD-RTO: 16.2 % (ref 11.7–14.9)
PLATELET # BLD: 436 K/CU MM (ref 140–440)
PMV BLD AUTO: 10.3 FL (ref 7.5–11.1)
PREGNANCY TEST URINE, POC: NEGATIVE
RBC # BLD: 5.03 M/CU MM (ref 4.2–5.4)
SEGMENTED NEUTROPHILS ABSOLUTE COUNT: 9 K/CU MM
SEGMENTED NEUTROPHILS RELATIVE PERCENT: 68 % (ref 36–66)
TOTAL IMMATURE NEUTOROPHIL: 0.06 K/CU MM
TOTAL NUCLEATED RBC: 0 K/CU MM
WBC # BLD: 13.2 K/CU MM (ref 4–10.5)

## 2022-11-14 PROCEDURE — 7100000000 HC PACU RECOVERY - FIRST 15 MIN: Performed by: SURGERY

## 2022-11-14 PROCEDURE — 6370000000 HC RX 637 (ALT 250 FOR IP): Performed by: SURGERY

## 2022-11-14 PROCEDURE — 3700000000 HC ANESTHESIA ATTENDED CARE: Performed by: SURGERY

## 2022-11-14 PROCEDURE — 81025 URINE PREGNANCY TEST: CPT

## 2022-11-14 PROCEDURE — 6360000002 HC RX W HCPCS: Performed by: ANESTHESIOLOGY

## 2022-11-14 PROCEDURE — 82962 GLUCOSE BLOOD TEST: CPT

## 2022-11-14 PROCEDURE — 2720000010 HC SURG SUPPLY STERILE: Performed by: SURGERY

## 2022-11-14 PROCEDURE — 6360000002 HC RX W HCPCS: Performed by: NURSE ANESTHETIST, CERTIFIED REGISTERED

## 2022-11-14 PROCEDURE — 3600000009 HC SURGERY ROBOT BASE: Performed by: SURGERY

## 2022-11-14 PROCEDURE — 85025 COMPLETE CBC W/AUTO DIFF WBC: CPT

## 2022-11-14 PROCEDURE — 2500000003 HC RX 250 WO HCPCS: Performed by: SURGERY

## 2022-11-14 PROCEDURE — 94761 N-INVAS EAR/PLS OXIMETRY MLT: CPT

## 2022-11-14 PROCEDURE — 2580000003 HC RX 258: Performed by: SURGERY

## 2022-11-14 PROCEDURE — 6360000002 HC RX W HCPCS: Performed by: SURGERY

## 2022-11-14 PROCEDURE — 2709999900 HC NON-CHARGEABLE SUPPLY: Performed by: SURGERY

## 2022-11-14 PROCEDURE — 7100000001 HC PACU RECOVERY - ADDTL 15 MIN: Performed by: SURGERY

## 2022-11-14 PROCEDURE — 43775 LAP SLEEVE GASTRECTOMY: CPT | Performed by: NURSE PRACTITIONER

## 2022-11-14 PROCEDURE — 2500000003 HC RX 250 WO HCPCS: Performed by: NURSE ANESTHETIST, CERTIFIED REGISTERED

## 2022-11-14 PROCEDURE — 8E0W4CZ ROBOTIC ASSISTED PROCEDURE OF TRUNK REGION, PERCUTANEOUS ENDOSCOPIC APPROACH: ICD-10-PCS | Performed by: SURGERY

## 2022-11-14 PROCEDURE — A4216 STERILE WATER/SALINE, 10 ML: HCPCS | Performed by: SURGERY

## 2022-11-14 PROCEDURE — 2580000003 HC RX 258: Performed by: NURSE ANESTHETIST, CERTIFIED REGISTERED

## 2022-11-14 PROCEDURE — 1200000000 HC SEMI PRIVATE

## 2022-11-14 PROCEDURE — S2900 ROBOTIC SURGICAL SYSTEM: HCPCS | Performed by: SURGERY

## 2022-11-14 PROCEDURE — 0DB64Z3 EXCISION OF STOMACH, PERCUTANEOUS ENDOSCOPIC APPROACH, VERTICAL: ICD-10-PCS | Performed by: SURGERY

## 2022-11-14 PROCEDURE — 43775 LAP SLEEVE GASTRECTOMY: CPT | Performed by: SURGERY

## 2022-11-14 PROCEDURE — 3700000001 HC ADD 15 MINUTES (ANESTHESIA): Performed by: SURGERY

## 2022-11-14 PROCEDURE — 2700000000 HC OXYGEN THERAPY PER DAY

## 2022-11-14 PROCEDURE — APPNB180 APP NON BILLABLE TIME > 60 MINS: Performed by: NURSE PRACTITIONER

## 2022-11-14 PROCEDURE — 3600000019 HC SURGERY ROBOT ADDTL 15MIN: Performed by: SURGERY

## 2022-11-14 PROCEDURE — 88307 TISSUE EXAM BY PATHOLOGIST: CPT | Performed by: PATHOLOGY

## 2022-11-14 RX ORDER — POTASSIUM CHLORIDE 20 MEQ/1
40 TABLET, EXTENDED RELEASE ORAL PRN
Status: DISCONTINUED | OUTPATIENT
Start: 2022-11-14 | End: 2022-11-15 | Stop reason: HOSPADM

## 2022-11-14 RX ORDER — SODIUM CHLORIDE, SODIUM LACTATE, POTASSIUM CHLORIDE, CALCIUM CHLORIDE 600; 310; 30; 20 MG/100ML; MG/100ML; MG/100ML; MG/100ML
INJECTION, SOLUTION INTRAVENOUS CONTINUOUS
Status: DISCONTINUED | OUTPATIENT
Start: 2022-11-14 | End: 2022-11-15 | Stop reason: HOSPADM

## 2022-11-14 RX ORDER — SODIUM CHLORIDE 0.9 % (FLUSH) 0.9 %
5-40 SYRINGE (ML) INJECTION EVERY 12 HOURS SCHEDULED
Status: DISCONTINUED | OUTPATIENT
Start: 2022-11-14 | End: 2022-11-14 | Stop reason: HOSPADM

## 2022-11-14 RX ORDER — IPRATROPIUM BROMIDE AND ALBUTEROL SULFATE 2.5; .5 MG/3ML; MG/3ML
1 SOLUTION RESPIRATORY (INHALATION)
Status: DISCONTINUED | OUTPATIENT
Start: 2022-11-14 | End: 2022-11-14 | Stop reason: HOSPADM

## 2022-11-14 RX ORDER — BUPIVACAINE HYDROCHLORIDE 5 MG/ML
INJECTION, SOLUTION EPIDURAL; INTRACAUDAL
Status: COMPLETED | OUTPATIENT
Start: 2022-11-14 | End: 2022-11-14

## 2022-11-14 RX ORDER — DIPHENHYDRAMINE HYDROCHLORIDE 50 MG/ML
12.5 INJECTION INTRAMUSCULAR; INTRAVENOUS
Status: DISCONTINUED | OUTPATIENT
Start: 2022-11-14 | End: 2022-11-14 | Stop reason: HOSPADM

## 2022-11-14 RX ORDER — KETAMINE HCL 50MG/ML(1)
SYRINGE (ML) INTRAVENOUS PRN
Status: DISCONTINUED | OUTPATIENT
Start: 2022-11-14 | End: 2022-11-14 | Stop reason: SDUPTHER

## 2022-11-14 RX ORDER — HEPARIN SODIUM 5000 [USP'U]/ML
5000 INJECTION, SOLUTION INTRAVENOUS; SUBCUTANEOUS ONCE
Status: COMPLETED | OUTPATIENT
Start: 2022-11-14 | End: 2022-11-14

## 2022-11-14 RX ORDER — SODIUM CHLORIDE 9 MG/ML
INJECTION, SOLUTION INTRAVENOUS CONTINUOUS PRN
Status: DISCONTINUED | OUTPATIENT
Start: 2022-11-14 | End: 2022-11-14 | Stop reason: SDUPTHER

## 2022-11-14 RX ORDER — SODIUM CHLORIDE, SODIUM LACTATE, POTASSIUM CHLORIDE, CALCIUM CHLORIDE 600; 310; 30; 20 MG/100ML; MG/100ML; MG/100ML; MG/100ML
INJECTION, SOLUTION INTRAVENOUS CONTINUOUS
Status: DISCONTINUED | OUTPATIENT
Start: 2022-11-14 | End: 2022-11-14 | Stop reason: HOSPADM

## 2022-11-14 RX ORDER — POTASSIUM CHLORIDE 7.45 MG/ML
10 INJECTION INTRAVENOUS PRN
Status: DISCONTINUED | OUTPATIENT
Start: 2022-11-14 | End: 2022-11-15 | Stop reason: HOSPADM

## 2022-11-14 RX ORDER — SODIUM CHLORIDE 0.9 % (FLUSH) 0.9 %
10 SYRINGE (ML) INJECTION PRN
Status: DISCONTINUED | OUTPATIENT
Start: 2022-11-14 | End: 2022-11-15 | Stop reason: HOSPADM

## 2022-11-14 RX ORDER — PROPOFOL 10 MG/ML
INJECTION, EMULSION INTRAVENOUS PRN
Status: DISCONTINUED | OUTPATIENT
Start: 2022-11-14 | End: 2022-11-14 | Stop reason: SDUPTHER

## 2022-11-14 RX ORDER — DEXAMETHASONE SODIUM PHOSPHATE 4 MG/ML
INJECTION, SOLUTION INTRA-ARTICULAR; INTRALESIONAL; INTRAMUSCULAR; INTRAVENOUS; SOFT TISSUE PRN
Status: DISCONTINUED | OUTPATIENT
Start: 2022-11-14 | End: 2022-11-14 | Stop reason: SDUPTHER

## 2022-11-14 RX ORDER — FENTANYL CITRATE 50 UG/ML
INJECTION, SOLUTION INTRAMUSCULAR; INTRAVENOUS PRN
Status: DISCONTINUED | OUTPATIENT
Start: 2022-11-14 | End: 2022-11-14 | Stop reason: SDUPTHER

## 2022-11-14 RX ORDER — SODIUM CHLORIDE 9 MG/ML
INJECTION, SOLUTION INTRAVENOUS PRN
Status: DISCONTINUED | OUTPATIENT
Start: 2022-11-14 | End: 2022-11-15 | Stop reason: HOSPADM

## 2022-11-14 RX ORDER — ROCURONIUM BROMIDE 10 MG/ML
INJECTION, SOLUTION INTRAVENOUS PRN
Status: DISCONTINUED | OUTPATIENT
Start: 2022-11-14 | End: 2022-11-14 | Stop reason: SDUPTHER

## 2022-11-14 RX ORDER — MEPERIDINE HYDROCHLORIDE 25 MG/ML
6.25 INJECTION INTRAMUSCULAR; INTRAVENOUS; SUBCUTANEOUS EVERY 5 MIN PRN
Status: DISCONTINUED | OUTPATIENT
Start: 2022-11-14 | End: 2022-11-14 | Stop reason: HOSPADM

## 2022-11-14 RX ORDER — ONDANSETRON 2 MG/ML
4 INJECTION INTRAMUSCULAR; INTRAVENOUS ONCE
Status: COMPLETED | OUTPATIENT
Start: 2022-11-14 | End: 2022-11-14

## 2022-11-14 RX ORDER — ENOXAPARIN SODIUM 100 MG/ML
60 INJECTION SUBCUTANEOUS EVERY 12 HOURS SCHEDULED
Status: DISCONTINUED | OUTPATIENT
Start: 2022-11-14 | End: 2022-11-15 | Stop reason: HOSPADM

## 2022-11-14 RX ORDER — DEXMEDETOMIDINE HYDROCHLORIDE 4 UG/ML
INJECTION, SOLUTION INTRAVENOUS CONTINUOUS PRN
Status: DISCONTINUED | OUTPATIENT
Start: 2022-11-14 | End: 2022-11-14 | Stop reason: SDUPTHER

## 2022-11-14 RX ORDER — PROCHLORPERAZINE EDISYLATE 5 MG/ML
5 INJECTION INTRAMUSCULAR; INTRAVENOUS
Status: COMPLETED | OUTPATIENT
Start: 2022-11-14 | End: 2022-11-14

## 2022-11-14 RX ORDER — LIDOCAINE HYDROCHLORIDE 20 MG/ML
INJECTION, SOLUTION INTRAVENOUS PRN
Status: DISCONTINUED | OUTPATIENT
Start: 2022-11-14 | End: 2022-11-14 | Stop reason: SDUPTHER

## 2022-11-14 RX ORDER — HYDRALAZINE HYDROCHLORIDE 20 MG/ML
10 INJECTION INTRAMUSCULAR; INTRAVENOUS
Status: DISCONTINUED | OUTPATIENT
Start: 2022-11-14 | End: 2022-11-14 | Stop reason: HOSPADM

## 2022-11-14 RX ORDER — MIDAZOLAM HYDROCHLORIDE 1 MG/ML
INJECTION INTRAMUSCULAR; INTRAVENOUS PRN
Status: DISCONTINUED | OUTPATIENT
Start: 2022-11-14 | End: 2022-11-14 | Stop reason: SDUPTHER

## 2022-11-14 RX ORDER — ESMOLOL HYDROCHLORIDE 10 MG/ML
INJECTION INTRAVENOUS PRN
Status: DISCONTINUED | OUTPATIENT
Start: 2022-11-14 | End: 2022-11-14 | Stop reason: SDUPTHER

## 2022-11-14 RX ORDER — SCOLOPAMINE TRANSDERMAL SYSTEM 1 MG/1
1 PATCH, EXTENDED RELEASE TRANSDERMAL ONCE
Status: DISCONTINUED | OUTPATIENT
Start: 2022-11-14 | End: 2022-11-14

## 2022-11-14 RX ORDER — HYDROCODONE BITARTRATE AND ACETAMINOPHEN 5; 325 MG/1; MG/1
1 TABLET ORAL EVERY 6 HOURS PRN
Status: DISCONTINUED | OUTPATIENT
Start: 2022-11-14 | End: 2022-11-15 | Stop reason: HOSPADM

## 2022-11-14 RX ORDER — KETOROLAC TROMETHAMINE 30 MG/ML
30 INJECTION, SOLUTION INTRAMUSCULAR; INTRAVENOUS EVERY 6 HOURS
Status: DISCONTINUED | OUTPATIENT
Start: 2022-11-14 | End: 2022-11-15 | Stop reason: HOSPADM

## 2022-11-14 RX ORDER — ONDANSETRON 2 MG/ML
4 INJECTION INTRAMUSCULAR; INTRAVENOUS EVERY 6 HOURS PRN
Status: DISCONTINUED | OUTPATIENT
Start: 2022-11-14 | End: 2022-11-15 | Stop reason: HOSPADM

## 2022-11-14 RX ORDER — FENTANYL CITRATE 50 UG/ML
50 INJECTION, SOLUTION INTRAMUSCULAR; INTRAVENOUS EVERY 5 MIN PRN
Status: DISCONTINUED | OUTPATIENT
Start: 2022-11-14 | End: 2022-11-14 | Stop reason: HOSPADM

## 2022-11-14 RX ORDER — CEFAZOLIN SODIUM 1 G/50ML
INJECTION, SOLUTION INTRAVENOUS PRN
Status: DISCONTINUED | OUTPATIENT
Start: 2022-11-14 | End: 2022-11-14 | Stop reason: SDUPTHER

## 2022-11-14 RX ORDER — SODIUM CHLORIDE 0.9 % (FLUSH) 0.9 %
5-40 SYRINGE (ML) INJECTION EVERY 12 HOURS SCHEDULED
Status: DISCONTINUED | OUTPATIENT
Start: 2022-11-14 | End: 2022-11-15 | Stop reason: HOSPADM

## 2022-11-14 RX ORDER — SODIUM CHLORIDE 0.9 % (FLUSH) 0.9 %
5-40 SYRINGE (ML) INJECTION PRN
Status: DISCONTINUED | OUTPATIENT
Start: 2022-11-14 | End: 2022-11-14 | Stop reason: HOSPADM

## 2022-11-14 RX ORDER — MIDAZOLAM HYDROCHLORIDE 2 MG/2ML
2 INJECTION, SOLUTION INTRAMUSCULAR; INTRAVENOUS
Status: DISCONTINUED | OUTPATIENT
Start: 2022-11-14 | End: 2022-11-14 | Stop reason: HOSPADM

## 2022-11-14 RX ORDER — METOCLOPRAMIDE HYDROCHLORIDE 5 MG/ML
10 INJECTION INTRAMUSCULAR; INTRAVENOUS EVERY 6 HOURS PRN
Status: DISCONTINUED | OUTPATIENT
Start: 2022-11-14 | End: 2022-11-15 | Stop reason: HOSPADM

## 2022-11-14 RX ORDER — SODIUM CHLORIDE 9 MG/ML
INJECTION, SOLUTION INTRAVENOUS PRN
Status: DISCONTINUED | OUTPATIENT
Start: 2022-11-14 | End: 2022-11-14 | Stop reason: HOSPADM

## 2022-11-14 RX ORDER — MAGNESIUM SULFATE IN WATER 40 MG/ML
2000 INJECTION, SOLUTION INTRAVENOUS PRN
Status: DISCONTINUED | OUTPATIENT
Start: 2022-11-14 | End: 2022-11-15 | Stop reason: HOSPADM

## 2022-11-14 RX ORDER — ONDANSETRON 2 MG/ML
INJECTION INTRAMUSCULAR; INTRAVENOUS PRN
Status: DISCONTINUED | OUTPATIENT
Start: 2022-11-14 | End: 2022-11-14 | Stop reason: SDUPTHER

## 2022-11-14 RX ORDER — HALOPERIDOL 5 MG/ML
1 INJECTION INTRAMUSCULAR
Status: DISCONTINUED | OUTPATIENT
Start: 2022-11-14 | End: 2022-11-14 | Stop reason: HOSPADM

## 2022-11-14 RX ORDER — LABETALOL HYDROCHLORIDE 5 MG/ML
10 INJECTION, SOLUTION INTRAVENOUS
Status: DISCONTINUED | OUTPATIENT
Start: 2022-11-14 | End: 2022-11-14 | Stop reason: HOSPADM

## 2022-11-14 RX ADMIN — PROPOFOL 200 MG: 10 INJECTION, EMULSION INTRAVENOUS at 09:11

## 2022-11-14 RX ADMIN — CEFAZOLIN SODIUM 3 G: 1 INJECTION, SOLUTION INTRAVENOUS at 09:15

## 2022-11-14 RX ADMIN — ENOXAPARIN SODIUM 60 MG: 100 INJECTION SUBCUTANEOUS at 22:55

## 2022-11-14 RX ADMIN — ONDANSETRON 4 MG: 2 INJECTION INTRAMUSCULAR; INTRAVENOUS at 07:44

## 2022-11-14 RX ADMIN — KETOROLAC TROMETHAMINE 30 MG: 30 INJECTION, SOLUTION INTRAMUSCULAR at 22:55

## 2022-11-14 RX ADMIN — PROCHLORPERAZINE EDISYLATE 5 MG: 5 INJECTION, SOLUTION INTRAMUSCULAR; INTRAVENOUS at 10:48

## 2022-11-14 RX ADMIN — SODIUM CHLORIDE, POTASSIUM CHLORIDE, SODIUM LACTATE AND CALCIUM CHLORIDE: 600; 310; 30; 20 INJECTION, SOLUTION INTRAVENOUS at 07:38

## 2022-11-14 RX ADMIN — HYDROCODONE BITARTRATE AND ACETAMINOPHEN 1 TABLET: 5; 325 TABLET ORAL at 14:21

## 2022-11-14 RX ADMIN — Medication 25 MG: at 09:11

## 2022-11-14 RX ADMIN — SODIUM CHLORIDE, PRESERVATIVE FREE 10 ML: 5 INJECTION INTRAVENOUS at 22:56

## 2022-11-14 RX ADMIN — FENTANYL CITRATE 50 MCG: 50 INJECTION INTRAMUSCULAR; INTRAVENOUS at 10:48

## 2022-11-14 RX ADMIN — SODIUM CHLORIDE, POTASSIUM CHLORIDE, SODIUM LACTATE AND CALCIUM CHLORIDE: 600; 310; 30; 20 INJECTION, SOLUTION INTRAVENOUS at 14:09

## 2022-11-14 RX ADMIN — KETOROLAC TROMETHAMINE 30 MG: 30 INJECTION, SOLUTION INTRAMUSCULAR at 17:30

## 2022-11-14 RX ADMIN — LIDOCAINE HYDROCHLORIDE 100 MG: 20 INJECTION, SOLUTION INTRAVENOUS at 09:11

## 2022-11-14 RX ADMIN — MIDAZOLAM 2 MG: 1 INJECTION INTRAMUSCULAR; INTRAVENOUS at 09:06

## 2022-11-14 RX ADMIN — ESMOLOL HYDROCHLORIDE 25 MG: 100 INJECTION, SOLUTION INTRAVENOUS at 09:11

## 2022-11-14 RX ADMIN — FAMOTIDINE 20 MG: 10 INJECTION, SOLUTION INTRAVENOUS at 22:56

## 2022-11-14 RX ADMIN — DEXAMETHASONE SODIUM PHOSPHATE 4 MG: 4 INJECTION, SOLUTION INTRAMUSCULAR; INTRAVENOUS at 09:11

## 2022-11-14 RX ADMIN — DEXMEDETOMIDINE HYDROCHLORIDE 0.2 MCG/KG/HR: 4 INJECTION, SOLUTION INTRAVENOUS at 09:12

## 2022-11-14 RX ADMIN — CEFAZOLIN 2000 MG: 2 INJECTION, POWDER, FOR SOLUTION INTRAMUSCULAR; INTRAVENOUS at 17:17

## 2022-11-14 RX ADMIN — SODIUM CHLORIDE: 9 INJECTION, SOLUTION INTRAVENOUS at 09:06

## 2022-11-14 RX ADMIN — SODIUM CHLORIDE, POTASSIUM CHLORIDE, SODIUM LACTATE AND CALCIUM CHLORIDE: 600; 310; 30; 20 INJECTION, SOLUTION INTRAVENOUS at 22:55

## 2022-11-14 RX ADMIN — HEPARIN SODIUM 5000 UNITS: 5000 INJECTION INTRAVENOUS; SUBCUTANEOUS at 07:44

## 2022-11-14 RX ADMIN — ONDANSETRON 4 MG: 2 INJECTION INTRAMUSCULAR; INTRAVENOUS at 10:18

## 2022-11-14 RX ADMIN — ROCURONIUM BROMIDE 75 MG: 10 INJECTION, SOLUTION INTRAVENOUS at 09:11

## 2022-11-14 RX ADMIN — FENTANYL CITRATE 100 MCG: 50 INJECTION, SOLUTION INTRAMUSCULAR; INTRAVENOUS at 09:11

## 2022-11-14 RX ADMIN — CEFAZOLIN 3000 MG: 10 INJECTION, POWDER, FOR SOLUTION INTRAVENOUS at 09:02

## 2022-11-14 RX ADMIN — FAMOTIDINE 20 MG: 10 INJECTION, SOLUTION INTRAVENOUS at 14:58

## 2022-11-14 ASSESSMENT — PAIN SCALES - WONG BAKER: WONGBAKER_NUMERICALRESPONSE: 0

## 2022-11-14 ASSESSMENT — PAIN SCALES - GENERAL
PAINLEVEL_OUTOF10: 7
PAINLEVEL_OUTOF10: 5
PAINLEVEL_OUTOF10: 7

## 2022-11-14 ASSESSMENT — ENCOUNTER SYMPTOMS
ABDOMINAL PAIN: 0
NAUSEA: 0
BACK PAIN: 0
CHEST TIGHTNESS: 0
BLOOD IN STOOL: 0
EYE DISCHARGE: 0
VOMITING: 0
DIARRHEA: 0
SINUS PAIN: 0
VOICE CHANGE: 0
WHEEZING: 1
SHORTNESS OF BREATH: 0
EYE PAIN: 0
SINUS PRESSURE: 0
COUGH: 0
ABDOMINAL DISTENTION: 1

## 2022-11-14 ASSESSMENT — PAIN DESCRIPTION - DESCRIPTORS
DESCRIPTORS: ACHING;CRAMPING;DISCOMFORT
DESCRIPTORS: PATIENT UNABLE TO DESCRIBE

## 2022-11-14 ASSESSMENT — PAIN - FUNCTIONAL ASSESSMENT
PAIN_FUNCTIONAL_ASSESSMENT: 0-10
PAIN_FUNCTIONAL_ASSESSMENT: PREVENTS OR INTERFERES SOME ACTIVE ACTIVITIES AND ADLS

## 2022-11-14 ASSESSMENT — PAIN DESCRIPTION - FREQUENCY: FREQUENCY: CONTINUOUS

## 2022-11-14 ASSESSMENT — PAIN DESCRIPTION - LOCATION
LOCATION: ABDOMEN

## 2022-11-14 ASSESSMENT — PAIN DESCRIPTION - PAIN TYPE: TYPE: SURGICAL PAIN

## 2022-11-14 ASSESSMENT — PAIN DESCRIPTION - ONSET: ONSET: PROGRESSIVE

## 2022-11-14 ASSESSMENT — PAIN DESCRIPTION - ORIENTATION
ORIENTATION: MID
ORIENTATION: MID

## 2022-11-14 NOTE — PROGRESS NOTES
1034- Patient arrived to PACU from OR, PACU monitors applied and alarms set. Bedside report from Lexy Moore [facility] and Tracie Croft CRNA. Patient appears in no acute distress, arouses to name call, denies pain, respirations equal and unlabored on 6 LPM simple mask.    1048- Patient medicated for pain and nausea  1055- Patient repositioned in bed  1100- Bedside report to Alex Fletcher RN to continue PACU care

## 2022-11-14 NOTE — H&P
History and Physical Update    Original H&P done in office on 11/11/2022 (less than 30 days ago). Pt reports the following changes in health since being seen last:  None  - Reports about 6 lbs of weight loss on preop diet    There were no vitals filed for this visit. Vitals:    11/14/22 0731   BP: 125/87   Pulse: 81   Resp: 18   Temp: 98.7 °F (37.1 °C)   SpO2: 99%         Alert and oriented x 3, no apparent distress at rest  Atraumatic, normocephalic. EOMI. Breathing unlabored. RRR. Soft, non-tender, non-distended. Moves all extremities. Warm, dry. Hang Reddy is a 35 yo female here today for elective bariatric surgery    -Consent obtained in office. -Abx ordered in office.  -Reviewed expected pre-operative, operative, and post-operative courses. -Answered questions to patient's satisfaction.   -Reviewed risks, benefits, alternative to procedure.   -Proceed as scheduled. -The patient was counseled at length about the risks of luis Covid-19 during their perioperative period and any recovery window from their procedure. The patient was made aware that luis Covid-19  may worsen their prognosis for recovering from their procedure  and lend to a higher morbidity and/or mortality risk. All material risks, benefits, and reasonable alternatives including postponing the procedure were discussed. The patient does wish to proceed with the procedure at this time. DADA Hernandez - CNP     Agree with above. Pt seen.  at bedside. D/w pt and  expected pre and post op courses. All questions answered. Proceed as scheduled.      Michela Hutton MD

## 2022-11-14 NOTE — PROGRESS NOTES
Patient admitted to unit. Skin assessment completed upon admission by myself and Delmar Burton LPN. No skin issues/open areas noted at this time. Call light and bedside table within reach.

## 2022-11-14 NOTE — ANESTHESIA POSTPROCEDURE EVALUATION
Department of Anesthesiology  Postprocedure Note    Patient: Gianluca Tian  MRN: [id number]  YOB: 1990  Date of evaluation: 11/14/2022      Procedure Summary     Date: 11/14/22 Room / Location: [address] [place] [address]    Anesthesia Start: 6983 Anesthesia Stop: 1034    Procedure: GASTRECTOMY SLEEVE LAPAROSCOPIC ROBOTIC (Abdomen) Diagnosis:       Morbid obesity ([facility]      (Morbid obesity ([facility] [facility] [E66.01])    Surgeons: Yomaira Caldwell MD Responsible Provider: Collette Hal, DO    Anesthesia Type: General ASA Status: 4          Anesthesia Type: General    Jose Luis Phase I: Jose Luis Score: 8    Jose Luis Phase II:        Anesthesia Post Evaluation    Patient location during evaluation: PACU  Patient participation: complete - patient participated  Level of consciousness: awake and alert  Pain score: 1  Airway patency: patent  Nausea & Vomiting: no nausea and no vomiting  Complications: no  Cardiovascular status: blood pressure returned to baseline and hemodynamically stable  Respiratory status: acceptable, face mask, nonlabored ventilation and spontaneous ventilation  Hydration status: euvolemic  Multimodal analgesia pain management approach

## 2022-11-14 NOTE — PROGRESS NOTES
1035 Pt arrived to PACU. Report given to Bernita Collet   1100 This nurse received report from [facility] and will resume care. 120 Pt wakes to voice. Denies pain. Falls back to sleep. . PACU care complete. Awaiting bed placement. 1140 Pt moved to inpatient bed. Linens changed and pt positioned for comfort.   1330 Report called to Cynthia arranged

## 2022-11-14 NOTE — CONSULTS
V2.0  [facility] Consult Note      Name:  Solange Dennis /Age/Sex: 1990  (34 y.o. female)   MRN & CSN:  [id number] & [id number] Encounter Date/Time: 2022 2:11 PM EST   Location:  [address] [place] [address] PCP: Subhash Novak MD     Attending:Tong Reynolds MD  Consulting Provider: Angi Tsai MD, MD      Hospital Day: 1    Assessment and Recommendations   Solange Dennis is a 34 y.o. female with pmh of history of COVID-19 infection in 2022, hypothyroidism, asthma, [place] [facility] who presents with Morbid obesity [facility])    Medical consult has been arranged for medical management status post sleeve gastrectomy    Hospital Problems             Last Modified POA    * (Principal) Morbid obesity ([facility] [address] 2022 Yes       Recommendations: Morbid obesity s/p sleeve gastrectomy: Postop day 1. Tolerated procedure well. No acute events otherwise. Consult for medicine for medical management. History of COVID-19 infection 2022: Mild Rales on bilateral lower lungs. History of cholecystectomy  Hypothyroidism on levothyroxine  Mild intermittent/persistent asthma without complications: Continue albuterol  Nonalcoholic fatty liver disease    Diet BARIATRIC DIET; Bariatric Clear Liquid   DVT Prophylaxis [x] Lovenox, []  Heparin, [] SCDs, [] Ambulation,  [] Eliquis, [] Xarelto   Code Status Full Code   Surrogate Decision Maker/ POA     Hi  History Obtained From:    patient    History of Present Illness:     Chief Complaint: Morbid obesity ([facility] [address]          Review of Systems:    Review of Systems   Constitutional:  Positive for appetite change and unexpected weight change. Negative for chills and fever. HENT:  Negative for ear pain, hearing loss, sinus pressure, sinus pain and voice change. Eyes:  Negative for pain, discharge and visual disturbance. Respiratory:  Positive for wheezing. Negative for cough, chest tightness and shortness of breath. Cardiovascular:  Positive for leg swelling.  Negative for chest pain and palpitations. Gastrointestinal:  Positive for abdominal distention. Negative for abdominal pain, blood in stool, diarrhea, nausea and vomiting. Genitourinary:  Negative for dysuria and frequency. Musculoskeletal:  Negative for arthralgias and back pain. Neurological:  Positive for weakness. Negative for dizziness, light-headedness and headaches. Psychiatric/Behavioral:  Negative for sleep disturbance. Objective: Intake/Output Summary (Last 24 hours) at 11/14/2022 1411  Last data filed at 11/14/2022 1034  Gross per 24 hour   Intake 879.48 ml   Output 30 ml   Net 849.48 ml      Vitals:   Vitals:    11/14/22 1100 11/14/22 1125 11/14/22 1356 11/14/22 1357   BP: (!) 137/95      Pulse: 79 77     Resp: 16      Temp:       TempSrc:       SpO2: 93%      Weight:    (!) 311 lb 4 oz (141.2 kg)   Height:   5' 4\" (1.626 m)        Medications Prior to Admission     Prior to Admission medications    Medication Sig Start Date End Date Taking? Authorizing Provider   famotidine (PEPCID) 20 MG tablet Take 1 tablet by mouth in the morning and 1 tablet before bedtime.   Patient not taking: No sig reported 8/12/22   Narcisa Roque II, MD   albuterol sulfate HFA (VENTOLIN HFA) 108 (90 Base) MCG/ACT inhaler Inhale 2 puffs into the lungs 4 times daily as needed for Wheezing 7/27/22   Juan M Yo MD   ferrous sulfate (IRON 325) 325 (65 Fe) MG tablet Take 1 tablet by mouth 3 times daily (with meals)  Patient not taking: No sig reported 5/12/22   DADA Wang - CNP   Prenatal Vit-Fe Fumarate-FA (PRENATAL VITAMINS) 28-0.8 MG TABS Take 1 tablet by mouth daily (any prenatal vitamin covered)  Patient not taking: No sig reported 1/10/22 4/13/22  Ghulam Gómez MD   levothyroxine (SYNTHROID) 175 MCG tablet Take 1 tablet by mouth daily  3/9/20   Historical Provider, MD       Physical Exam: Need 8 Elements   BP (!) 137/95   Pulse 77   Temp 97.3 °F (36.3 °C) (Temporal)   Resp 16   Ht 5' 4\" (1.626 m)   Wt (!) 311 lb 4 oz (141.2 kg)   LMP 10/29/2022 (Exact Date)   SpO2 93%   BMI 53.43 kg/m²   Physical Exam  Vitals reviewed. Constitutional:       Appearance: Normal appearance. She is normal weight. HENT:      Head: Normocephalic. Nose: Nose normal.      Mouth/Throat:      Mouth: Mucous membranes are dry. Eyes:      Conjunctiva/sclera: Conjunctivae normal.      Pupils: Pupils are equal, round, and reactive to light. Cardiovascular:      Rate and Rhythm: Normal rate and regular rhythm. Pulses: Normal pulses. Heart sounds: Normal heart sounds. No murmur heard. Pulmonary:      Effort: Pulmonary effort is normal.      Breath sounds: Wheezing and rales present. No rhonchi. Abdominal:      General: Abdomen is flat. Bowel sounds are normal. There is distension. Palpations: Abdomen is soft. Tenderness: There is no abdominal tenderness. Musculoskeletal:         General: No deformity. Normal range of motion. Cervical back: Normal range of motion and neck supple. Right lower leg: No edema. Left lower leg: No edema. Skin:     General: Skin is dry. Coloration: Skin is not jaundiced or pale. Neurological:      General: No focal deficit present. Mental Status: She is alert and oriented to person, place, and time. Mental status is at baseline. Motor: Weakness present. Past Medical History:   PMHx   Past Medical History:   Diagnosis Date    Abdominal pain     Abnormal uterine bleeding (AUB)     Acute pelvic pain     Anemia 2010    Depression     Foot ulcer, left ([address] 2017    status post nerve decompression repair, excision of neoplasm, left foot, with muscle flap and graft skin application.     Graves disease Dx: 2/2019    Dr. Yan Fisher disease     History of blood transfusion     Hypermenorrhea     Irregular menses     Liver disease     Menorrhagia     Mild persistent asthma without complication 04/25/7401    Morbid obesity ([address] Nexplanon removal     Thyroid disorder     UTI (lower urinary tract infection)     chronic UTI's with this pregnancy - last     UTI (urinary tract infection)     Vertigo      PSHX:  has a past surgical history that includes  section; laparoscopy (2013); Upper gastrointestinal endoscopy (2012); Cholecystectomy (); hernia repair (N/A); hernia repair (); Thyroidectomy (Bilateral, 2020); Foot surgery (Left, ); Arm Surgery (Left, 2021); and Upper gastrointestinal endoscopy (N/A, 2022). Allergies: Allergies   Allergen Reactions    Pcn [Penicillins] Hives    Morphine Hives    Gabapentin Rash     Fam HX: family history includes Cancer in her maternal grandmother and paternal grandmother; Diabetes in her mother; Heart Disease in her mother; No Known Problems in her father; Thyroid Disease in her paternal aunt and sister.   Soc HX:   Social History     Socioeconomic History    Marital status: Single     Spouse name: None    Number of children: None    Years of education: None    Highest education level: None   Tobacco Use    Smoking status: Former     Packs/day: 0.10     Years: 1.00     Pack years: 0.10     Types: Cigarettes     Start date:      Quit date: 2013     Years since quittin.9    Smokeless tobacco: Never   Vaping Use    Vaping Use: Never used   Substance and Sexual Activity    Alcohol use: No    Drug use: No     Types: Marijuana (Weed)     Comment: quit in     Sexual activity: Yes     Partners: Male       Medications:   Medications:    ketorolac  30 mg IntraVENous Q6H    sodium chloride flush  5-40 mL IntraVENous 2 times per day    ceFAZolin  2,000 mg IntraVENous Q8H    famotidine (PEPCID) injection  20 mg IntraVENous BID    enoxaparin  60 mg SubCUTAneous 2 times per day      Infusions:    sodium chloride      lactated ringers 125 mL/hr at 22 1409     PRN Meds: HYDROcodone-acetaminophen, 1 tablet, Q6H PRN  sodium chloride flush, 10 mL, PRN  sodium chloride, , PRN  potassium chloride, 40 mEq, PRN   Or  potassium alternative oral replacement, 40 mEq, PRN   Or  potassium chloride, 10 mEq, PRN  magnesium sulfate, 2,000 mg, PRN  HYDROmorphone, 0.5 mg, Q3H PRN  ondansetron, 4 mg, Q6H PRN  metoclopramide, 10 mg, Q6H PRN        Labs and Imaging   No results found. CBC:   Recent Labs     11/14/22  0724   WBC 13.2*   HGB 11.1*        BMP:  No results for input(s): NA, K, CL, CO2, BUN, CREATININE, GLUCOSE in the last 72 hours. Hepatic: No results for input(s): AST, ALT, ALB, BILITOT, ALKPHOS in the last 72 hours. Lipids:   Lab Results   Component Value Date/Time    CHOL 153 04/08/2022 08:42 AM    HDL 31 04/08/2022 08:42 AM    TRIG 118 04/08/2022 08:42 AM     Hemoglobin A1C:   Lab Results   Component Value Date/Time    LABA1C 5.9 11/10/2022 09:17 AM     TSH: No results found for: TSH  Troponin:   Lab Results   Component Value Date/Time    TROPONINT <0.010 02/25/2020 04:43 PM    TROPONINT <0.010 11/16/2016 08:29 PM    TROPONINT <0.010 06/09/2016 09:30 AM     Lactic Acid: No results for input(s): LACTA in the last 72 hours. BNP: No results for input(s): PROBNP in the last 72 hours.   UA:  Lab Results   Component Value Date/Time    NITRU POSITIVE 10/08/2018 05:45 PM    NITRU NEGATIVE 10/31/2013 11:00 AM    COLORU YELLOW 10/08/2018 05:45 PM    WBCUA 2 TO 3 10/08/2018 05:45 PM    RBCUA NO CELLS SEEN 10/08/2018 05:45 PM    MUCUS 3+ 10/08/2018 05:45 PM    TRICHOMONAS RARE 03/16/2014 02:15 PM    YEAST OCCASIONAL 04/27/2016 08:49 AM    BACTERIA MODERATE 10/08/2018 05:45 PM    CLARITYU CLEAR 10/08/2018 05:45 PM    SPECGRAV 1.025 10/08/2018 05:45 PM    LEUKOCYTESUR NEGATIVE 10/08/2018 05:45 PM    UROBILINOGEN 1.0 10/08/2018 05:45 PM    BILIRUBINUR NEGATIVE 10/08/2018 05:45 PM    BLOODU NEGATIVE 10/08/2018 05:45 PM    GLUCOSEU NEGATIVE 10/31/2013 11:00 AM    KETUA NEGATIVE 10/08/2018 05:45 PM     Urine Cultures: No results found for: Abdirahman Ramey  Blood Cultures: No results found for: BC  No results found for: BLOODCULT2  Organism:   Lab Results   Component Value Date/Time    ORG ECOL 04/27/2016 10:00 AM       Personally reviewed Lab Studies, Imaging    Electronically signed by Tereza Page MD, MD on 11/14/2022 at 2:11 PM

## 2022-11-14 NOTE — OP NOTE
Operative Report    Patient: Mt Diane  YOB: 1990  MRN: [id number]    Date of Surgery:  11/14/22    Surgeon:  Rosalie Wilcox MD, JUDITH, NAVNEETMBS    Assistant(s):  Sonya Johnson, [facility] skilled assistance of the CNP was necessary for the successful completion of this case. She was essential for the proper positioning, manipulation of instruments, proper exposure, manipulation of tissue, and wound closure. Preoperative Diagnosis: 1. Morbid obesity      2. Nonalcoholic fatty liver disease       3. Asthma    Postoperative Diagnosis:  1. Same as above and hepatomegaly    Procedure(s) Performed:  1. Robotic-assisted laparoscopic sleeve gastrectomy    IVF: 800 mL crystalloid    Estimated Blood Loss: Less than 30 mL    Anesthesia: General endotracheal    Specimen(s):   1. Sleeve stomach    Drain(s): None    Findings: Consistent with postoperative diagnosis    Complications: None apparent    Indication for Procedure: This patient is a 34 y.o. female with a history of morbid obesity. The patient currently has a BMI of 52.51. The patient attended an informational seminar for bariatric surgery, was determined to be an appropriate candidate, and underwent an extensive workup prior to being approved for surgery. The patient made the decision to undergo the above listed procedure(s). All of the patient's questions related to the surgery--preoperative, operative, and postoperative--were answered previously. An informed consent was obtained after discussing in detail with the patient the risks, benefits, and alternatives to surgery. Description of Procedure: On 11/14/22, the patient was brought to the operating room from the preoperative holding area. In the preoperative holding area, the patient received heparin. The patient was placed on the operating room table in the supine position. The patient was secured to the operating room table using multiple straps and a foot board.  All pressure points were well-padded. The patient was intubated endotracheally after SCD boots were placed bilaterally. The patient received IV antibiotics in accordance with national protocol. The abdomen was then prepped and draped in a standard fashion. An approved timeout was held with all members of the operating room team present and in agreement. Using an #11 blade, a 5 mm incision was made in the left lower mid abdomen. The abdomen was entered under direct vision using a 5 mm 0 degree laparoscope housed in a 5 mm optical trocar. Pneumoperitoneum was established using CO2 to 15 mm Hg. Once inside the abdominal cavity, an inspection ensued. The remaining ports were placed under direct vision. The ports placed were the following sizes and locations: three 8 mm ports across the mid abdomen in a lateral orientation, the initial 5 mm port was upsized to an 8 mm port, a 12 mm assistant port in the right lower mid abdomen, and a 5 mm liver retractor port in the subxiphoid location. There was no injury to any intra-abdominal structures during port placement. The robot was brought to the operating room table on the patient's left side and docked. The patient was placed in the steep reverse Trendelenburg. A liver retractor was secured to the table and passed into the abdominal cavity. The liver was retracted cephalad and anteriorly, exposing the esophageal hiatus. The anterior gastric fat pad was identified. The angle of His was bluntly mobilized. The esophageal hiatus was inspected and it was determined that there was no evidence of an hiatal hernia. Attention was directed toward the pylorus. The vein of dukes was used to confirm the position. Approximately six centimeters proximal to the pylorus, the vessel sealer was used to divide the gastrocolic omentum. The lesser sac was opened and the division of this omentum was carried up all the way to the angle of His.  The short gastrics were identified and carefully cauterized, using the vessel sealer. After completing the division of the greater curvature attachments, a 38 [place] bougie was passed toward the pylorus. Sequential 60mm firings of a da Nano SureForm stapler were performed (2 blue and 5 white), taking care not to overly narrow the pouch, specifically at the incisura. At the angle of His, an outpuching of tissue was left to ensure that the staple line did not encroach on the esophagus. The staple line was examined found to be hemostatic. An intraoperative leak test was performed which demonstrated no leak. The robot was undocked and moved away from the operating room table. The 12 mm port site was then widened with a Hanna clamp and the gastric sleeve specimen was removed intact and passed off for permanent pathology. The extraction site was closed with several trans-fascial 0-Vicryl sutures. All skin incisions were closed using a 4-0 Vicryl in a subcuticular fashion. Dermabond was used to cover the incisions. The patient was extubated and moved to the recovery room in stable condition. At the end of the case, the needle, instrument, and sponge counts were correct x 2. Dr. Claudine Martinez was present, scrubbed, and supervised the entire case. Dr. Claudine Martinez personally informed the family of the outcome of the procedure.     Beatrice Morales MD

## 2022-11-15 VITALS
SYSTOLIC BLOOD PRESSURE: 135 MMHG | WEIGHT: 293 LBS | TEMPERATURE: 98.6 F | HEART RATE: 63 BPM | HEIGHT: 64 IN | RESPIRATION RATE: 18 BRPM | DIASTOLIC BLOOD PRESSURE: 89 MMHG | OXYGEN SATURATION: 97 % | BODY MASS INDEX: 50.02 KG/M2

## 2022-11-15 LAB
ALBUMIN SERPL-MCNC: 3.7 GM/DL (ref 3.4–5)
ALP BLD-CCNC: 75 IU/L (ref 40–128)
ALT SERPL-CCNC: 42 U/L (ref 10–40)
ANION GAP SERPL CALCULATED.3IONS-SCNC: 13 MMOL/L (ref 4–16)
AST SERPL-CCNC: 36 IU/L (ref 15–37)
BASOPHILS ABSOLUTE: 0.1 K/CU MM
BASOPHILS RELATIVE PERCENT: 0.4 % (ref 0–1)
BILIRUB SERPL-MCNC: 0.7 MG/DL (ref 0–1)
BUN BLDV-MCNC: 8 MG/DL (ref 6–23)
CALCIUM SERPL-MCNC: 8.4 MG/DL (ref 8.3–10.6)
CHLORIDE BLD-SCNC: 103 MMOL/L (ref 99–110)
CO2: 25 MMOL/L (ref 21–32)
CREAT SERPL-MCNC: 0.8 MG/DL (ref 0.6–1.1)
DIFFERENTIAL TYPE: ABNORMAL
EOSINOPHILS ABSOLUTE: 0 K/CU MM
EOSINOPHILS RELATIVE PERCENT: 0.1 % (ref 0–3)
GFR SERPL CREATININE-BSD FRML MDRD: >60 ML/MIN/1.73M2
GLUCOSE BLD-MCNC: 103 MG/DL (ref 70–99)
HCT VFR BLD CALC: 35.4 % (ref 37–47)
HEMOGLOBIN: 10.6 GM/DL (ref 12.5–16)
IMMATURE NEUTROPHIL %: 0.4 % (ref 0–0.43)
LYMPHOCYTES ABSOLUTE: 3.6 K/CU MM
LYMPHOCYTES RELATIVE PERCENT: 20.6 % (ref 24–44)
MCH RBC QN AUTO: 22.3 PG (ref 27–31)
MCHC RBC AUTO-ENTMCNC: 29.9 % (ref 32–36)
MCV RBC AUTO: 74.5 FL (ref 78–100)
MONOCYTES ABSOLUTE: 0.8 K/CU MM
MONOCYTES RELATIVE PERCENT: 4.4 % (ref 0–4)
NUCLEATED RBC %: 0 %
PDW BLD-RTO: 16.1 % (ref 11.7–14.9)
PLATELET # BLD: 455 K/CU MM (ref 140–440)
PMV BLD AUTO: 9.8 FL (ref 7.5–11.1)
POTASSIUM SERPL-SCNC: 3.7 MMOL/L (ref 3.5–5.1)
RBC # BLD: 4.75 M/CU MM (ref 4.2–5.4)
SEGMENTED NEUTROPHILS ABSOLUTE COUNT: 12.8 K/CU MM
SEGMENTED NEUTROPHILS RELATIVE PERCENT: 74.1 % (ref 36–66)
SODIUM BLD-SCNC: 141 MMOL/L (ref 135–145)
TOTAL IMMATURE NEUTOROPHIL: 0.07 K/CU MM
TOTAL NUCLEATED RBC: 0 K/CU MM
TOTAL PROTEIN: 7 GM/DL (ref 6.4–8.2)
WBC # BLD: 17.3 K/CU MM (ref 4–10.5)

## 2022-11-15 PROCEDURE — 6370000000 HC RX 637 (ALT 250 FOR IP): Performed by: INTERNAL MEDICINE

## 2022-11-15 PROCEDURE — A4216 STERILE WATER/SALINE, 10 ML: HCPCS | Performed by: SURGERY

## 2022-11-15 PROCEDURE — 2500000003 HC RX 250 WO HCPCS: Performed by: SURGERY

## 2022-11-15 PROCEDURE — 99024 POSTOP FOLLOW-UP VISIT: CPT | Performed by: NURSE PRACTITIONER

## 2022-11-15 PROCEDURE — 2580000003 HC RX 258: Performed by: SURGERY

## 2022-11-15 PROCEDURE — 99024 POSTOP FOLLOW-UP VISIT: CPT | Performed by: SURGERY

## 2022-11-15 PROCEDURE — 94664 DEMO&/EVAL PT USE INHALER: CPT

## 2022-11-15 PROCEDURE — 80053 COMPREHEN METABOLIC PANEL: CPT

## 2022-11-15 PROCEDURE — 94761 N-INVAS EAR/PLS OXIMETRY MLT: CPT

## 2022-11-15 PROCEDURE — APPNB30 APP NON BILLABLE TIME 0-30 MINS: Performed by: NURSE PRACTITIONER

## 2022-11-15 PROCEDURE — 85025 COMPLETE CBC W/AUTO DIFF WBC: CPT

## 2022-11-15 PROCEDURE — 94150 VITAL CAPACITY TEST: CPT

## 2022-11-15 PROCEDURE — 36415 COLL VENOUS BLD VENIPUNCTURE: CPT

## 2022-11-15 PROCEDURE — 6360000002 HC RX W HCPCS: Performed by: SURGERY

## 2022-11-15 RX ORDER — ONDANSETRON 4 MG/1
4 TABLET, ORALLY DISINTEGRATING ORAL 3 TIMES DAILY PRN
Qty: 21 TABLET | Refills: 2 | Status: SHIPPED | OUTPATIENT
Start: 2022-11-15

## 2022-11-15 RX ORDER — PANTOPRAZOLE SODIUM 20 MG/1
20 TABLET, DELAYED RELEASE ORAL 2 TIMES DAILY
Qty: 60 TABLET | Refills: 3 | Status: SHIPPED | OUTPATIENT
Start: 2022-11-15

## 2022-11-15 RX ORDER — SENNA PLUS 8.6 MG/1
1 TABLET ORAL 2 TIMES DAILY
Qty: 60 TABLET | Refills: 11 | Status: SHIPPED | OUTPATIENT
Start: 2022-11-15 | End: 2023-11-15

## 2022-11-15 RX ORDER — HYDROCODONE BITARTRATE AND ACETAMINOPHEN 5; 325 MG/1; MG/1
1 TABLET ORAL EVERY 6 HOURS PRN
Qty: 20 TABLET | Refills: 0 | Status: SHIPPED | OUTPATIENT
Start: 2022-11-15 | End: 2022-11-20

## 2022-11-15 RX ORDER — LEVOTHYROXINE SODIUM 0.1 MG/1
200 TABLET ORAL DAILY
Status: DISCONTINUED | OUTPATIENT
Start: 2022-11-15 | End: 2022-11-15 | Stop reason: HOSPADM

## 2022-11-15 RX ADMIN — SODIUM CHLORIDE, POTASSIUM CHLORIDE, SODIUM LACTATE AND CALCIUM CHLORIDE: 600; 310; 30; 20 INJECTION, SOLUTION INTRAVENOUS at 06:18

## 2022-11-15 RX ADMIN — KETOROLAC TROMETHAMINE 30 MG: 30 INJECTION, SOLUTION INTRAMUSCULAR at 06:13

## 2022-11-15 RX ADMIN — KETOROLAC TROMETHAMINE 30 MG: 30 INJECTION, SOLUTION INTRAMUSCULAR at 12:25

## 2022-11-15 RX ADMIN — CEFAZOLIN 2000 MG: 2 INJECTION, POWDER, FOR SOLUTION INTRAMUSCULAR; INTRAVENOUS at 02:10

## 2022-11-15 RX ADMIN — FAMOTIDINE 20 MG: 10 INJECTION, SOLUTION INTRAVENOUS at 12:25

## 2022-11-15 RX ADMIN — ENOXAPARIN SODIUM 60 MG: 100 INJECTION SUBCUTANEOUS at 08:51

## 2022-11-15 RX ADMIN — LEVOTHYROXINE SODIUM 200 MCG: 0.1 TABLET ORAL at 11:17

## 2022-11-15 RX ADMIN — CEFAZOLIN 2000 MG: 2 INJECTION, POWDER, FOR SOLUTION INTRAMUSCULAR; INTRAVENOUS at 08:51

## 2022-11-15 ASSESSMENT — PAIN DESCRIPTION - ORIENTATION
ORIENTATION: ANTERIOR;MID

## 2022-11-15 ASSESSMENT — PAIN SCALES - GENERAL
PAINLEVEL_OUTOF10: 0
PAINLEVEL_OUTOF10: 5
PAINLEVEL_OUTOF10: 3
PAINLEVEL_OUTOF10: 9

## 2022-11-15 ASSESSMENT — PAIN DESCRIPTION - ONSET
ONSET: ON-GOING
ONSET: PROGRESSIVE

## 2022-11-15 ASSESSMENT — PAIN DESCRIPTION - LOCATION
LOCATION: ABDOMEN

## 2022-11-15 ASSESSMENT — PAIN SCALES - WONG BAKER
WONGBAKER_NUMERICALRESPONSE: 4

## 2022-11-15 ASSESSMENT — PAIN DESCRIPTION - FREQUENCY
FREQUENCY: CONTINUOUS
FREQUENCY: CONTINUOUS

## 2022-11-15 ASSESSMENT — PAIN DESCRIPTION - DESCRIPTORS
DESCRIPTORS: ACHING
DESCRIPTORS: ACHING
DESCRIPTORS: ACHING;DISCOMFORT

## 2022-11-15 ASSESSMENT — PAIN - FUNCTIONAL ASSESSMENT
PAIN_FUNCTIONAL_ASSESSMENT: ACTIVITIES ARE NOT PREVENTED
PAIN_FUNCTIONAL_ASSESSMENT: PREVENTS OR INTERFERES SOME ACTIVE ACTIVITIES AND ADLS

## 2022-11-15 ASSESSMENT — PAIN DESCRIPTION - PAIN TYPE
TYPE: SURGICAL PAIN;ACUTE PAIN
TYPE: SURGICAL PAIN

## 2022-11-15 NOTE — PROGRESS NOTES
Outpatient Pharmacy Progress Note for Meds-to-Beds    Total number of Prescriptions Filled: 4  The following medications were dispensed to the patient during the discharge process:  Hydrocodone-APAP  Senna  Pantoprazole   Ondansetron     Additional Documentation:  Medication(s) were delivered to the patient's room prior to discharge      Thank you for letting us serve your patients.   [facility]    [address], [address] [facility]    Phone: [phone]    Fax: [phone]

## 2022-11-15 NOTE — DISCHARGE SUMMARY
Discharge Summary     Patient ID:  Juanjo Hdz  [id number]  37 y.o.  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 Graves disease Dx: 2019    Dr. Aleman Doing disease     History of blood transfusion     Hypermenorrhea     Irregular menses     Liver disease     Menorrhagia     Mild persistent asthma without complication     Morbid obesity ([facility] [address]     Nexplanon removal     Thyroid disorder     UTI (lower urinary tract infection)     chronic UTI's with this pregnancy - last     UTI (urinary tract infection)     Vertigo        Discharge Diagnoses: same    Admission Condition: Good. Discharged Condition: Good. Indication for Admission: Elective bariatric surgery. Hospital Course: Patient had an uneventful hospital course after her bariatric procedure that went uneventfully: robot assisted sleeve gastrectomy and was tolerating bariatric stage II diet and ambulating without difficulty at the time of discharge.     Consults: Hospitalist / PCP.    Treatments: IV hydration, antibiotics, analgesia, LMW heparin, respiratory therapy: O2 and incentive spirometry and surgery: robot assisted sleeve gastrectomy. Discharge Exam:  - See daily progress note    Discharge vitals: Wt Readings from Last 3 Encounters:   11/14/22 (!) 311 lb 4 oz (141.2 kg)   11/11/22 (!) 305 lb 14.4 oz (138.8 kg)   11/01/22 (!) 306 lb 4.8 oz (138.9 kg)   ,  Temp Readings from Last 3 Encounters:   11/15/22 98.1 °F (36.7 °C) (Oral)   08/12/22 97.4 °F (36.3 °C) (Temporal)   06/30/22 98.1 °F (36.7 °C) (Oral)   ,  BP Readings from Last 3 Encounters:   11/15/22 127/84   11/11/22 132/80   11/01/22 120/84   ,  Pulse Readings from Last 3 Encounters:   11/15/22 68   11/11/22 90   11/01/22 58        Disposition: home. Patient Instructions:   Current Discharge Medication List        START taking these medications    Details   pantoprazole (PROTONIX) 20 MG tablet Take 1 tablet by mouth 2 times daily  Qty: 60 tablet, Refills: 3      HYDROcodone-acetaminophen (NORCO) 5-325 MG per tablet Take 1 tablet by mouth every 6 hours as needed for Pain for up to 5 days. Intended supply: 5 days. Take lowest dose possible to manage pain  Qty: 20 tablet, Refills: 0    Comments: Reduce doses taken as pain becomes manageable  Associated Diagnoses:  Morbid obesity (HCC)      ondansetron (ZOFRAN-ODT) 4 MG disintegrating tablet Take 1 tablet by mouth 3 times daily as needed for Nausea or Vomiting  Qty: 21 tablet, Refills: 2      senna (SENOKOT) 8.6 MG tablet Take 1 tablet by mouth 2 times daily  Qty: 60 tablet, Refills: 11           CONTINUE these medications which have NOT CHANGED    Details   albuterol sulfate HFA (VENTOLIN HFA) 108 (90 Base) MCG/ACT inhaler Inhale 2 puffs into the lungs 4 times daily as needed for Wheezing  Qty: 54 g, Refills: 1      levothyroxine (SYNTHROID) 200 MCG tablet Take 1 tablet by mouth daily            STOP taking these medications       famotidine (PEPCID) 20 MG tablet Comments: Reason for Stopping:         ferrous sulfate (IRON 325) 325 (65 Fe) MG tablet Comments:   Reason for Stopping:         Prenatal Vit-Fe Fumarate-FA (PRENATAL VITAMINS) 28-0.8 MG TABS Comments:   Reason for Stopping:             Activity: no lifting > 20 Lbs, or Strenuous exercise for 3 weeks. Diet: encourage fluids and bariatric stage II diet for 2 wks.   Wound Care: keep wound clean and dry    Call  [phone] to make a follow-up appointment  with Dr Wolf Vásquez in 1 week.    ____________________________________________    Signed:    DADA Membreno CNP, DADA-CNP    11/15/2022  2:06 PM

## 2022-11-15 NOTE — PROGRESS NOTES
V2.0  [facility] [place] [facility] Progress Note      Name:  Juan Diego Hairston /Age/Sex: 1990  (34 y.o. female)   MRN & CSN:  [id number] & [id number] Encounter Date/Time: 11/15/2022 10:00 AM EST    Location:  [address] [place] [address] PCP: Aspen Smith MD       Hospital Day: 2    Assessment and Plan:   Juan Diego Hairston is a 34 y.o. female     Morbid obesity status post robotic assisted laparoscopic sleeve gastrectomy on   -Doing well today    History of COVID-19 infection in 2022-stable at this time    Hypothyroidism-continue levothyroxine    Nonalcoholic fatty liver disease-outpatient follow-up    Asthma-stable    Diet BARIATRIC DIET; Bariatric Full Liquid   DVT Prophylaxis [x] Lovenox, []  Heparin, [] SCDs, [] Ambulation,  [] Eliquis, [] Xarelto  [] Coumadin   Code Status Full Code   Disposition From: Home  Expected Disposition: Home  Estimated Date of Discharge: Today   Surrogate Decision Maker/ BERYL Rodrigues, significant other listed in chart as alternate contact     Subjective:        Juan Diego Hairston is a 34 y.o. female who     Seen at 10:05 am - reports she has some jello and chicken broth    PCP is Dr. Harry Villagomez  - on Synthroid - ordered it             Review of Systems:    Review of Systems    No chest pain or dyspnea reported    Objective: Intake/Output Summary (Last 24 hours) at 11/15/2022 1000  Last data filed at 2022  Gross per 24 hour   Intake 879.48 ml   Output 280 ml   Net 599.48 ml        Vitals:   Vitals:    11/15/22 0810   BP: 127/84   Pulse: 68   Resp: 17   Temp: 98.1 °F (36.7 °C)   SpO2: 95%       Physical Exam:     General: NAD  Eyes: EOMI  Respiratory: Clear to auscultation anteriorly and superiorly  Neuro: Alert. Psych: Mood appropriate.      Medications:   Medications:    ketorolac  30 mg IntraVENous Q6H    sodium chloride flush  5-40 mL IntraVENous 2 times per day    famotidine (PEPCID) injection  20 mg IntraVENous BID    enoxaparin  60 mg SubCUTAneous 2 times per day      Infusions:    sodium chloride      lactated ringers 125 mL/hr at 11/15/22 0618     PRN Meds: HYDROcodone-acetaminophen, 1 tablet, Q6H PRN  sodium chloride flush, 10 mL, PRN  sodium chloride, , PRN  potassium chloride, 40 mEq, PRN   Or  potassium alternative oral replacement, 40 mEq, PRN   Or  potassium chloride, 10 mEq, PRN  magnesium sulfate, 2,000 mg, PRN  HYDROmorphone, 0.5 mg, Q3H PRN  ondansetron, 4 mg, Q6H PRN  metoclopramide, 10 mg, Q6H PRN        Labs      Recent Results (from the past 24 hour(s))   POCT Glucose    Collection Time: 11/14/22 11:22 AM   Result Value Ref Range    POC Glucose 168 (H) 70 - 99 MG/DL   Comprehensive Metabolic Panel w/ Reflex to MG    Collection Time: 11/15/22  5:51 AM   Result Value Ref Range    Sodium 141 135 - 145 MMOL/L    Potassium 3.7 3.5 - 5.1 MMOL/L    Chloride 103 99 - 110 mMol/L    CO2 25 21 - 32 MMOL/L    BUN 8 6 - 23 MG/DL    Creatinine 0.8 0.6 - 1.1 MG/DL    Est, Glom Filt Rate >60 >60 mL/min/1.73m2    Glucose 103 (H) 70 - 99 MG/DL    Calcium 8.4 8.3 - 10.6 MG/DL    Albumin 3.7 3.4 - 5.0 GM/DL    Total Protein 7.0 6.4 - 8.2 GM/DL    Total Bilirubin 0.7 0.0 - 1.0 MG/DL    ALT 42 (H) 10 - 40 U/L    AST 36 15 - 37 IU/L    Alkaline Phosphatase 75 40 - 128 IU/L    Anion Gap 13 4 - 16   CBC with Auto Differential    Collection Time: 11/15/22  5:51 AM   Result Value Ref Range    WBC 17.3 (H) 4.0 - 10.5 K/CU MM    RBC 4.75 4.2 - 5.4 M/CU MM    Hemoglobin 10.6 (L) 12.5 - 16.0 GM/DL    Hematocrit 35.4 (L) 37 - 47 %    MCV 74.5 (L) 78 - 100 FL    MCH 22.3 (L) 27 - 31 PG    MCHC 29.9 (L) 32.0 - 36.0 %    RDW 16.1 (H) 11.7 - 14.9 %    Platelets 710 (H) 872 - 440 K/CU MM    MPV 9.8 7.5 - 11.1 FL    Differential Type AUTOMATED DIFFERENTIAL     Segs Relative 74.1 (H) 36 - 66 %    Lymphocytes % 20.6 (L) 24 - 44 %    Monocytes % 4.4 (H) 0 - 4 %    Eosinophils % 0.1 0 - 3 %    Basophils % 0.4 0 - 1 %    Segs Absolute 12.8 K/CU MM    Lymphocytes Absolute 3.6 K/CU MM Monocytes Absolute 0.8 K/CU MM    Eosinophils Absolute 0.0 K/CU MM    Basophils Absolute 0.1 K/CU MM    Nucleated RBC % 0.0 %    Total Nucleated RBC 0.0 K/CU MM    Total Immature Neutrophil 0.07 K/CU MM    Immature Neutrophil % 0.4 0 - 0.43 %        Imaging/Diagnostics Last 24 Hours   No results found.     Electronically signed by Efrain Lopez MD on 11/15/2022 at 10:00 AM

## 2022-11-15 NOTE — DISCHARGE INSTRUCTIONS
HOME CARE INSTRUCTIONS FOLLOWING SURGERY     Diet:  Slowly advance diet to stage II FULL LIQUIDS BARIATRIC diet as tolerated x 2 wks. Increase your fluids, as pain medications may cause constipation. No alcoholic beverages. Activities:  No strenuous activity  No lifting greater than 20 pounds  No driving while taking pain medication    Wound Care:  No tub baths, swimming, or hot tubs for 6 wks. May shower 24 hours after surgery. Ice to incision the first 24 hrs as needed for pain; after that may use dry warm compresses as needed. Allow Dermaond (the skin glue) to fall off on its own. Pain control:  Norco 5/325 m tablet by mouth every 4-6 hours as needed for pain. It is best to avoid narcotics if able. Recommend taking tylenol around the clock and only take Norco for breakthrough pain. When taking narcotic pain medication it increases the risk of constipation and addictive habits. Antacid:  Protonix as prescribed twice daily for 3 months. Stool softener:  Senna 8.6 mg with Docusate Sodium 50 mg (Senokot-S): Two Capsules by mouth every day while on Percocet (or any narcotics) to prevent constipation. (May take 2-twice a day if needed). Laxative:  Milk of magnesia 30-60 ml by mouth every day as needed for constipation. Please call the office at: [phone]  to make a follow-up with Dr Zuleyka Adames in 1 week. Call Surgeon if you have:  Temperature greater than 101. Persistent nausea and vomiting. Severe uncontrolled pain. Redness, tenderness, or signs of infection (pain, swelling, redness, odor or green/yellow discharge around the site). Difficulty breathing, headache or visual disturbances. Hives. Persistent dizziness or light-headedness. Extreme fatigue. Any other questions or concerns you may have after discharge. In an emergency, call 911 or go to an Emergency Department.       It is important to bring a complete, current list of your medications to any medical appointments or hospitalizations.   ____________________________________________    Signed:    DADA Colmenares - CNP, APRN-CNP    11/15/2022  2:05 PM

## 2022-11-15 NOTE — CARE COORDINATION
CM screened pt for discharge needs. Pt from home with her . . Pt has PCP. Pt has insurance and able to obtain her prescriptions. CM will remain available for any needs that arise.  [address] [place]

## 2022-11-15 NOTE — PROGRESS NOTES
BARIATRIC SURGERY PROGRESS NOTE    HPI: Mt Diane is a 34 y.o. female who is POD # 1 status post robot assisted sleeve gastrectomy. Doing well this morning. Pain and nausea well controlled. Tolerating clears. + OOB. + IS use. Vitals:    11/14/22 1436 11/14/22 2038 11/15/22 0047 11/15/22 0810   BP: (!) 144/90 132/87 111/71 127/84   Pulse: 85 88 74 68   Resp: 19 17 18 17   Temp: 97.9 °F (36.6 °C) 98.2 °F (36.8 °C) 97.7 °F (36.5 °C) 98.1 °F (36.7 °C)   TempSrc: Oral Oral  Oral   SpO2: 93% 95% 95% 95%   Weight:       Height:         I/O last 3 completed shifts: In: 879.5 [I.V.:729.5; IV Piggyback:150]  Out: 280 [Urine:250; Blood:30]  No intake/output data recorded. BARIATRIC DIET;  Bariatric Clear Liquid    Recent Results (from the past 48 hour(s))   CBC with Auto Differential    Collection Time: 11/14/22  7:24 AM   Result Value Ref Range    WBC 13.2 (H) 4.0 - 10.5 K/CU MM    RBC 5.03 4.2 - 5.4 M/CU MM    Hemoglobin 11.1 (L) 12.5 - 16.0 GM/DL    Hematocrit 38.1 37 - 47 %    MCV 75.7 (L) 78 - 100 FL    MCH 22.1 (L) 27 - 31 PG    MCHC 29.1 (L) 32.0 - 36.0 %    RDW 16.2 (H) 11.7 - 14.9 %    Platelets 229 131 - 005 K/CU MM    MPV 10.3 7.5 - 11.1 FL    Differential Type AUTOMATED DIFFERENTIAL     Segs Relative 68.0 (H) 36 - 66 %    Lymphocytes % 24.9 24 - 44 %    Monocytes % 4.5 (H) 0 - 4 %    Eosinophils % 1.6 0 - 3 %    Basophils % 0.5 0 - 1 %    Segs Absolute 9.0 K/CU MM    Lymphocytes Absolute 3.3 K/CU MM    Monocytes Absolute 0.6 K/CU MM    Eosinophils Absolute 0.2 K/CU MM    Basophils Absolute 0.1 K/CU MM    Nucleated RBC % 0.0 %    Total Nucleated RBC 0.0 K/CU MM    Total Immature Neutrophil 0.06 K/CU MM    Immature Neutrophil % 0.5 (H) 0 - 0.43 %   POC Pregnancy Urine Qual    Collection Time: 11/14/22  7:53 AM   Result Value Ref Range    Preg Test, Ur NEGATIVE NEGATIVE   POCT Glucose    Collection Time: 11/14/22 11:22 AM   Result Value Ref Range    POC Glucose 168 (H) 70 - 99 MG/DL Comprehensive Metabolic Panel w/ Reflex to MG    Collection Time: 11/15/22  5:51 AM   Result Value Ref Range    Sodium 141 135 - 145 MMOL/L    Potassium 3.7 3.5 - 5.1 MMOL/L    Chloride 103 99 - 110 mMol/L    CO2 25 21 - 32 MMOL/L    BUN 8 6 - 23 MG/DL    Creatinine 0.8 0.6 - 1.1 MG/DL    Est, Glom Filt Rate >60 >60 mL/min/1.73m2    Glucose 103 (H) 70 - 99 MG/DL    Calcium 8.4 8.3 - 10.6 MG/DL    Albumin 3.7 3.4 - 5.0 GM/DL    Total Protein 7.0 6.4 - 8.2 GM/DL    Total Bilirubin 0.7 0.0 - 1.0 MG/DL    ALT 42 (H) 10 - 40 U/L    AST 36 15 - 37 IU/L    Alkaline Phosphatase 75 40 - 128 IU/L    Anion Gap 13 4 - 16   CBC with Auto Differential    Collection Time: 11/15/22  5:51 AM   Result Value Ref Range    WBC 17.3 (H) 4.0 - 10.5 K/CU MM    RBC 4.75 4.2 - 5.4 M/CU MM    Hemoglobin 10.6 (L) 12.5 - 16.0 GM/DL    Hematocrit 35.4 (L) 37 - 47 %    MCV 74.5 (L) 78 - 100 FL    MCH 22.3 (L) 27 - 31 PG    MCHC 29.9 (L) 32.0 - 36.0 %    RDW 16.1 (H) 11.7 - 14.9 %    Platelets 241 (H) 709 - 440 K/CU MM    MPV 9.8 7.5 - 11.1 FL    Differential Type AUTOMATED DIFFERENTIAL     Segs Relative 74.1 (H) 36 - 66 %    Lymphocytes % 20.6 (L) 24 - 44 %    Monocytes % 4.4 (H) 0 - 4 %    Eosinophils % 0.1 0 - 3 %    Basophils % 0.4 0 - 1 %    Segs Absolute 12.8 K/CU MM    Lymphocytes Absolute 3.6 K/CU MM    Monocytes Absolute 0.8 K/CU MM    Eosinophils Absolute 0.0 K/CU MM    Basophils Absolute 0.1 K/CU MM    Nucleated RBC % 0.0 %    Total Nucleated RBC 0.0 K/CU MM    Total Immature Neutrophil 0.07 K/CU MM    Immature Neutrophil % 0.4 0 - 0.43 %       Scheduled Meds:   ketorolac  30 mg IntraVENous Q6H    sodium chloride flush  5-40 mL IntraVENous 2 times per day    ceFAZolin  2,000 mg IntraVENous Q8H    famotidine (PEPCID) injection  20 mg IntraVENous BID    enoxaparin  60 mg SubCUTAneous 2 times per day     Continuous Infusions:   sodium chloride      lactated ringers 125 mL/hr at 11/15/22 0618       Physical Exam:  HEENT: Anicteric sclerae, Oropharyngeal mucosae moist, pink and intact. Heart:  Normal S1 and S2, RRR  Lungs: Clear to auscultation bilaterally, No audible Wheezes or Rales. Extremities: No edema. Neuro: Alert and Oriented x 3, Non focal.  Abdomen: Soft, appropriately tender, Non distended, Positive bowel sounds. Incision: Nicely healing: No erythema, No discharge, glue intact      Principal Problem: Morbid obesity ([address]  Resolved Problems:    * No resolved hospital problems. *      Assessment and Plan:  Cas Sainz is a 34 y.o. female who is POD # 1 status post robot assisted sleeve gastrectomy. - Pain and nausea under adequate control.  - Labs reviewed. Leukocytosis likely reactive  - Will advance to bariatric full liquids  - Increase ambulation to at least 4x/day walk in the hallways with assistance. Respiratory adis-operative care: encourage incentive Spirometry / deep breathing and - coughing 10x/hr while awake. - Continue DVT prophylaxis with Teds and SCDs and SC Lovenox. - Continue GI prophylaxis with Protonix IV till able to tolerate PO.  - Will plan for discharge later this afternoon once she nears 32oz fluid goal.  Discussed discharge, medications, restrictions, postop care, and diet stages. Patient verbalizes understanding. DADA Baker - CNP, [facility]    11/15/2022  8:42 AM  ___________________________________________      Doing well. Without issues or complaints. Ambulating. Denies F/C. Denies N/V. Tolerating PO. Using IS. Denies CP/SOB. Vitals:    11/15/22 1127   BP:    Pulse:    Resp:    Temp:    SpO2: 94%     34 y.o. female POD 1 s/p robotic-assisted sleeve gastrectomy    -Advance diet to bariatric fulls. Will be on bariatric fulls x 2 weeks (goal at home is 600 calories per day and 60 grams of protein per day). -Appreciate Medicine evaluation and recommendations. -DVT, GI prophylaxis.   -Continue IS use and ambulation.   -If continues to do well will plan d/c later today.  D/w pt home going instructions, activity, and plan. Pt instructed to call office with any questions whatsoever.     Stephanie Verdugo MD

## 2022-11-16 ENCOUNTER — SCHEDULED TELEPHONE ENCOUNTER (OUTPATIENT)
Dept: BARIATRICS/WEIGHT MGMT | Age: 32
End: 2022-11-16

## 2022-11-16 DIAGNOSIS — E66.01 MORBID OBESITY WITH BMI OF 50.0-59.9, ADULT (HCC): Primary | ICD-10-CM

## 2022-11-16 PROCEDURE — 99024 POSTOP FOLLOW-UP VISIT: CPT | Performed by: NURSE PRACTITIONER

## 2022-11-22 ENCOUNTER — OFFICE VISIT (OUTPATIENT)
Dept: BARIATRICS/WEIGHT MGMT | Age: 32
End: 2022-11-22

## 2022-11-22 VITALS
BODY MASS INDEX: 49.35 KG/M2 | HEART RATE: 94 BPM | SYSTOLIC BLOOD PRESSURE: 132 MMHG | DIASTOLIC BLOOD PRESSURE: 74 MMHG | OXYGEN SATURATION: 100 % | HEIGHT: 64 IN | WEIGHT: 289.1 LBS

## 2022-11-22 DIAGNOSIS — Z98.84 STATUS POST LAPAROSCOPIC SLEEVE GASTRECTOMY: Primary | ICD-10-CM

## 2022-11-22 PROCEDURE — 99024 POSTOP FOLLOW-UP VISIT: CPT | Performed by: SURGERY

## 2022-11-22 ASSESSMENT — PATIENT HEALTH QUESTIONNAIRE - PHQ9
SUM OF ALL RESPONSES TO PHQ QUESTIONS 1-9: 1
SUM OF ALL RESPONSES TO PHQ9 QUESTIONS 1 & 2: 1
SUM OF ALL RESPONSES TO PHQ QUESTIONS 1-9: 1
1. LITTLE INTEREST OR PLEASURE IN DOING THINGS: 0
SUM OF ALL RESPONSES TO PHQ QUESTIONS 1-9: 1
2. FEELING DOWN, DEPRESSED OR HOPELESS: 1
SUM OF ALL RESPONSES TO PHQ QUESTIONS 1-9: 1

## 2022-11-22 NOTE — PROGRESS NOTES
BARIATRIC SURGERY OFFICE PROGRESS NOTE    SUBJECTIVE:    Patient presenting today referred from Yamel Olguin MD, for   Chief Complaint   Patient presents with    Weight Management     1st PO Sleeve Gastrectomy @ [facility] 2022   . Vitals:    22 1543   BP: 132/74   Pulse: 94   SpO2: 100%        BMI: Body mass index is 49.62 kg/m². Weight History: Wt Readings from Last 3 Encounters:   22 289 lb 1.6 oz (131.1 kg)   22 (!) 311 lb 4 oz (141.2 kg)   22 (!) 305 lb 14.4 oz (138.8 kg)       If within 30 days of bariatric surgery date, have you been to the ED: No    HPI:Juani Odom is a 34 y.o. female presenting in first bariatric POST-OP visit. Weight change:     -16.8 lbs since last visit.    -16.8 lbs since surgery.    -32.4 lbs since starting program.    Changes in health since last visit: None, doing well. Pre-op clearances completed: N/A. Pt tracking calories/protein: Yes. Approximately 500 lilliana / d. Approximately 60 g protein / d. Pt exercising: Walking. Pt taking vitamins: Yes. Fluid intake: Appropriate. Past Medical History:   Diagnosis Date    Abdominal pain     Abnormal uterine bleeding (AUB)     Acute pelvic pain     Anemia     Depression     Foot ulcer, left ([address] 2017    status post nerve decompression repair, excision of neoplasm, left foot, with muscle flap and graft skin application.     Graves disease Dx: 2019    Dr. Massiel Elizabeth disease     History of blood transfusion     Hypermenorrhea     Irregular menses     Liver disease     Menorrhagia     Mild persistent asthma without complication 2703    Morbid obesity ([address]     Nexplanon removal     Thyroid disorder     UTI (lower urinary tract infection)     chronic UTI's with this pregnancy - last     UTI (urinary tract infection)     Vertigo         Patient Active Problem List   Diagnosis    Cyst of finger    Previous  delivery affecting pregnancy    Periumbilical abdominal pain    Melena    Rectal bleeding    Iron deficiency anemia    Morbid obesity with BMI of 50.0-59.9, adult (HCC)    Chronic GERD    Fatty liver    Chronic bilateral low back pain without sciatica    Neoplasm of uncertain behavior of skin of thigh    Graves disease    Foreign body in soft tissue    Mild persistent asthma without complication    Morbid obesity ([address]       Past Surgical History:   Procedure Laterality Date    ARM SURGERY Left 08/31/2021    LEFT ARM FOREIGN BODY REMOVAL performed by Valorie Darby MD at [facility]      2010, 2014    CHOLECYSTECTOMY  2010    FOOT SURGERY Left 2017    lipoma removed    HERNIA REPAIR N/A     umbilical    HERNIA REPAIR  2013    LAPAROSCOPY  12/21/2013    diagnostic    SLEEVE GASTRECTOMY N/A 11/14/2022    GASTRECTOMY SLEEVE LAPAROSCOPIC ROBOTIC performed by Jasper Damon MD at [place] [address]. Bilateral 03/03/2020    TOTAL THYROIDECTOMY performed by Valorie Darby MD at [address] [place] [address]  07/24/2012    Gastritis -Dr. Eriberto Cohen ENDOSCOPY N/A 8/12/2022    EGD BIOPSY performed by Jasper Damon MD at [facility] ENDOSCOPY       Current Outpatient Medications   Medication Sig Dispense Refill    pantoprazole (PROTONIX) 20 MG tablet Take 1 tablet by mouth 2 times daily 60 tablet 3    ondansetron (ZOFRAN-ODT) 4 MG disintegrating tablet Take 1 tablet by mouth 3 times daily as needed for Nausea or Vomiting 21 tablet 2    senna (SENOKOT) 8.6 MG tablet Take 1 tablet by mouth 2 times daily 60 tablet 11    albuterol sulfate HFA (VENTOLIN HFA) 108 (90 Base) MCG/ACT inhaler Inhale 2 puffs into the lungs 4 times daily as needed for Wheezing 54 g 1    levothyroxine (SYNTHROID) 200 MCG tablet Take 1 tablet by mouth daily        No current facility-administered medications for this visit.         Allergies   Allergen Reactions    Pcn [Penicillins] Hives    Morphine Hives    Gabapentin Rash         Review of Systems   All other systems reviewed and are negative. OBJECTIVE:    /74 (Site: Left Upper Arm, Position: Sitting, Cuff Size: Large Adult)   Pulse 94   Ht 5' 4\" (1.626 m)   Wt 289 lb 1.6 oz (131.1 kg)   LMP 10/29/2022 (Exact Date)   SpO2 100%   BMI 49.62 kg/m²      Physical Exam  Vitals reviewed. HENT:      Head: Normocephalic and atraumatic. Right Ear: External ear normal.      Left Ear: External ear normal.   Eyes:      General:         Right eye: No discharge. Left eye: No discharge. Extraocular Movements: Extraocular movements intact. Abdominal:      Palpations: Abdomen is soft. Comments: Incisions healing appropriately C/D/I     Musculoskeletal:         General: No swelling. Skin:     General: Skin is warm. Neurological:      General: No focal deficit present. Mental Status: She is alert. Pathology:  Final Pathologic Diagnosis:   Portion of stomach; partial sleeve gastrectomy:   -     No significant histopathologic abnormality. ASSESSMENT & PLAN:    1. Status post laparoscopic sleeve gastrectomy  -Pathology report reviewed. -Advance diet as tolerated. -Increase activity. -Goal 60 g protein / d, 600 lilliana / d.   -Start MVI  -F/u two weeks.  -Call with any questions, concerns, or issues whatsoever. As of current visit, regarding obesity-related co-morbid conditions:  ERIC [] compliant [] no longer using [] resolved per sleep study; hypertension [] medications; hyperlipidemia [] medications; GERD [] medications; DM [] insulin [] non-insulin [] no meds        No orders of the defined types were placed in this encounter. No orders of the defined types were placed in this encounter. Follow Up:  No follow-ups on file.     Rachel Narvaez MD

## (undated) DEVICE — SYRINGE MED 20ML STD CLR PLAS LUERLOCK TIP N CTRL DISP

## (undated) DEVICE — REDUCER: Brand: ENDOWRIST

## (undated) DEVICE — SUTURE VCRL SZ 3-0 L27IN ABSRB UD L26MM SH 1/2 CIR J416H

## (undated) DEVICE — LINER,SEMI-RIGID,3000CC,50EA/CS: Brand: MEDLINE

## (undated) DEVICE — VESSEL SEALER EXTEND: Brand: ENDOWRIST

## (undated) DEVICE — GLOVE ORANGE PI 7   MSG9070

## (undated) DEVICE — SUTURE PERMAHAND SZ 2-0 L18IN NONABSORBABLE BLK L26MM SH C012D

## (undated) DEVICE — GLOVE SURG SZ 6 THK91MIL LTX FREE SYN POLYISOPRENE ANTI

## (undated) DEVICE — INTENDED FOR TISSUE SEPARATION, AND OTHER PROCEDURES THAT REQUIRE A SHARP SURGICAL BLADE TO PUNCTURE OR CUT.: Brand: BARD-PARKER ® STAINLESS STEEL BLADES

## (undated) DEVICE — SUTURE STRATAFIX SPRL PDS + VLT 3-0 15CM DISPOSABLE/SNGLE SXPP1B420

## (undated) DEVICE — GLOVE SURG SZ 65 THK91MIL LTX FREE SYN POLYISOPRENE

## (undated) DEVICE — BANDAGE,GAUZE,BULKEE II,4.5"X4.1YD,STRL: Brand: MEDLINE

## (undated) DEVICE — CANNULA SEAL

## (undated) DEVICE — SYRINGE 20ML LL S/C 50

## (undated) DEVICE — SYRINGE IRRIG 60ML SFT PLIABLE BLB EZ TO GRP 1 HND USE W/

## (undated) DEVICE — ADHESIVE SKIN CLSR 0.7ML TOP DERMBND ADV

## (undated) DEVICE — SUTURE MCRYL SZ 4-0 L18IN ABSRB UD L19MM PS-2 3/8 CIR PRIM Y496G

## (undated) DEVICE — PACK,BASIC,SIRUS,V: Brand: MEDLINE

## (undated) DEVICE — Device

## (undated) DEVICE — LARGE SUTURE CUT NEEDLE DRIVER: Brand: ENDOWRIST

## (undated) DEVICE — PENCIL ES CRD L10FT HND SWCHING ROCK SWCH W/ EDGE COAT BLDE

## (undated) DEVICE — Z DISCONTINUED (USE MFG CAT MVABO)  TUBING GAS SAMPLING STD 6.5 FT FEMALE CONN SMRT CAPNOLINE

## (undated) DEVICE — TUBING, SUCTION, 3/16" X 10', STRAIGHT: Brand: MEDLINE

## (undated) DEVICE — STAPLER 60 RELOAD BLUE: Brand: SUREFORM

## (undated) DEVICE — TUBE CULTURE LF UNIFORM BOTTOM STER

## (undated) DEVICE — GOWN,ECLIPSE,POLYRNF,BRTHSLV,L,30/CS: Brand: MEDLINE

## (undated) DEVICE — STAPLER 60: Brand: SUREFORM

## (undated) DEVICE — EXCEL 10FT (3.05 M) INSUFFLATION TUBING SET WITH 0.1 MICRON FILTER: Brand: EXCEL

## (undated) DEVICE — CHLORAPREP 26ML ORANGE

## (undated) DEVICE — SHEET PT TRANSFER 80X40 IN LAT AIR NYL GRY COMFORT GLIDE

## (undated) DEVICE — SUREFORM 45: Brand: SUREFORM

## (undated) DEVICE — C-ARM: Brand: UNBRANDED

## (undated) DEVICE — Z DUP USE 2257490 ADHESIVE SKIN CLSRE 036ML TPCL 2CTL CNCRLTE HIGH VSCSTY DRMB

## (undated) DEVICE — GLOVE SURG SZ 65 CRM LTX FREE POLYISOPRENE POLYMER BEAD ANTI

## (undated) DEVICE — DRAPE,HAND,STERILE: Brand: MEDLINE

## (undated) DEVICE — CONTAINER,SPECIMEN,OR STERILE,4OZ: Brand: MEDLINE

## (undated) DEVICE — COUNTER NDL 30 COUNT FOAM STRP SGL MAG

## (undated) DEVICE — BLADELESS OBTURATOR: Brand: WECK VISTA

## (undated) DEVICE — GAUZE,SPONGE,4"X4",16PLY,XRAY,STRL,LF: Brand: MEDLINE

## (undated) DEVICE — GLOVE ORANGE PI 7 1/2   MSG9075

## (undated) DEVICE — PACK SURG LAP CHOLE

## (undated) DEVICE — GLOVE SURG SZ 75 L12IN FNGR THK79MIL GRN LTX FREE

## (undated) DEVICE — DRAPE,UTILITY,XL,4/PK,STERILE: Brand: MEDLINE

## (undated) DEVICE — GOWN,SIRUS,POLYRNF,BRTHSLV,XLN/XL,20/CS: Brand: MEDLINE

## (undated) DEVICE — TOWEL,OR,DSP,ST,BLUE,STD,6/PK,12PK/CS: Brand: MEDLINE

## (undated) DEVICE — ENDOSCOPY KIT: Brand: MEDLINE INDUSTRIES, INC.

## (undated) DEVICE — MARKER SURG SKIN UTIL REGULAR/FINE 2 TIP W/ RUL AND 9 LBL

## (undated) DEVICE — SET TBNG DISP TIP FOR AHTO

## (undated) DEVICE — GLOVE SURG SZ 7 L12IN FNGR THK94MIL TRNSLUC YEL LTX HYDRGEL

## (undated) DEVICE — GLOVE SURG SZ 7 CRM LTX FREE POLYISOPRENE POLYMER BEAD ANTI

## (undated) DEVICE — LAPAROSCOPIC TROCAR SLEEVE/SINGLE USE: Brand: KII® OPTICAL ACCESS SYSTEM

## (undated) DEVICE — SEALER LAP SM L18.8CM OPN JAW HAND/FOOT SWCH FORCETRIAD

## (undated) DEVICE — GOWN,ECLIPSE,POLYRNF,BRTHSLV,XL,30/CS: Brand: MEDLINE

## (undated) DEVICE — ELECTRODE ES L2.75IN S STL INSUL BLDE W/ SL EDGE

## (undated) DEVICE — FORCEPS BX L240CM JAW DIA2.8MM L CAP W/ NDL MIC MESH TOOTH

## (undated) DEVICE — COLUMN DRAPE

## (undated) DEVICE — FAN SPRAY KIT: Brand: PULSAVAC®

## (undated) DEVICE — ARM DRAPE

## (undated) DEVICE — SOLUTION IV IRRIG WATER 1000ML POUR BRL 2F7114

## (undated) DEVICE — CLIP INT SM WIDE RED TI TRNSVRS GRV CHEVRON SHP W/ PRECIS

## (undated) DEVICE — PACK,BASIC,IX: Brand: MEDLINE

## (undated) DEVICE — CADIERE FORCEPS: Brand: ENDOWRIST

## (undated) DEVICE — YANKAUER,FLEXIBLE HANDLE,REGLR CAPACITY: Brand: MEDLINE INDUSTRIES, INC.

## (undated) DEVICE — SEAL

## (undated) DEVICE — SPONGE,PEANUT,XRAY,ST,SM,3/8",5/CARD: Brand: MEDLINE INDUSTRIES, INC.

## (undated) DEVICE — SUTURE PERMAHAND SZ 3-0 L18IN NONABSORBABLE BLK L26MM SH C013D

## (undated) DEVICE — AGENT HEMSTAT W2XL4IN OXIDIZED REGENERATED CELOS ABSRB

## (undated) DEVICE — NEEDLE,25GX1.5",REG,BEVEL: Brand: MEDLINE

## (undated) DEVICE — ELECTRODE ES AD CRDLSS PT RET REM POLYHESIVE

## (undated) DEVICE — NEEDLE HYPO 20GA L1.5IN YEL POLYPR HUB S STL REG BVL STR

## (undated) DEVICE — SUTURE VCRL SZ 4-0 L27IN ABSRB UD L19MM FS-2 3/8 CIR REV J422H

## (undated) DEVICE — SPONGE GZ W4XL8IN COT WVN 12 PLY

## (undated) DEVICE — 3M™ STERI-DRAPE™ INSTRUMENT POUCH 1018: Brand: STERI-DRAPE™

## (undated) DEVICE — SUTURE COAT VCRL SZ 4-0 L18IN ABSRB UD L19MM PS-2 1/2 CIR J496G

## (undated) DEVICE — 7 FRENCH DRAIN SYSTEM, STERILE: Brand: TLS

## (undated) DEVICE — SHEET,DRAPE,40X58,STERILE: Brand: MEDLINE

## (undated) DEVICE — STAPLER 60 RELOAD WHITE: Brand: SUREFORM

## (undated) DEVICE — TUBING, SUCTION, 9/32" X 10', STRAIGHT: Brand: MEDLINE

## (undated) DEVICE — SUTURE PERMA-HAND SZ 3-0 L18IN 17 STRND NONABSORBABLE BLK SA64H

## (undated) DEVICE — SUTURE SZ 0 27IN 5/8 CIR UR-6  TAPER PT VIOLET ABSRB VICRYL J603H